# Patient Record
Sex: FEMALE | Race: BLACK OR AFRICAN AMERICAN | NOT HISPANIC OR LATINO | Employment: STUDENT | URBAN - METROPOLITAN AREA
[De-identification: names, ages, dates, MRNs, and addresses within clinical notes are randomized per-mention and may not be internally consistent; named-entity substitution may affect disease eponyms.]

---

## 2020-06-11 ENCOUNTER — OFFICE VISIT CONVERTED (OUTPATIENT)
Dept: INTERNAL MEDICINE | Facility: CLINIC | Age: 25
End: 2020-06-11
Attending: PHYSICIAN ASSISTANT

## 2020-06-11 ENCOUNTER — HOSPITAL ENCOUNTER (OUTPATIENT)
Dept: OTHER | Facility: HOSPITAL | Age: 25
Discharge: HOME | End: 2020-06-11
Attending: PHYSICIAN ASSISTANT

## 2020-06-11 ENCOUNTER — CONVERSION ENCOUNTER (OUTPATIENT)
Dept: INTERNAL MEDICINE | Facility: CLINIC | Age: 25
End: 2020-06-11

## 2020-06-11 LAB
ALBUMIN SERPL-MCNC: 4.4 G/DL (ref 3.5–5)
ALBUMIN/GLOB SERPL: 1.3 {RATIO} (ref 1.4–2.6)
ALP SERPL-CCNC: 54 U/L (ref 42–98)
ALT SERPL-CCNC: 21 U/L (ref 10–40)
ANION GAP SERPL CALC-SCNC: 14 MMOL/L (ref 8–19)
AST SERPL-CCNC: 21 U/L (ref 15–50)
BASOPHILS # BLD AUTO: 0.05 10*3/UL (ref 0–0.2)
BASOPHILS NFR BLD AUTO: 0.7 % (ref 0–3)
BILIRUB SERPL-MCNC: 0.17 MG/DL (ref 0.2–1.3)
BUN SERPL-MCNC: 11 MG/DL (ref 5–25)
BUN/CREAT SERPL: 14 {RATIO} (ref 6–20)
CALCIUM SERPL-MCNC: 9.5 MG/DL (ref 8.7–10.4)
CHLORIDE SERPL-SCNC: 103 MMOL/L (ref 99–111)
CHOLEST SERPL-MCNC: 211 MG/DL (ref 107–200)
CHOLEST/HDLC SERPL: 4.5 {RATIO} (ref 3–6)
CONV ABS IMM GRAN: 0.02 10*3/UL (ref 0–0.2)
CONV CO2: 23 MMOL/L (ref 22–32)
CONV IMMATURE GRAN: 0.3 % (ref 0–1.8)
CONV TOTAL PROTEIN: 7.7 G/DL (ref 6.3–8.2)
CREAT UR-MCNC: 0.78 MG/DL (ref 0.5–0.9)
DEPRECATED RDW RBC AUTO: 60.6 FL (ref 36.4–46.3)
EOSINOPHIL # BLD AUTO: 0.07 10*3/UL (ref 0–0.7)
EOSINOPHIL # BLD AUTO: 1 % (ref 0–7)
ERYTHROCYTE [DISTWIDTH] IN BLOOD BY AUTOMATED COUNT: 17.6 % (ref 11.7–14.4)
GFR SERPLBLD BASED ON 1.73 SQ M-ARVRAT: >60 ML/MIN/{1.73_M2}
GLOBULIN UR ELPH-MCNC: 3.3 G/DL (ref 2–3.5)
GLUCOSE SERPL-MCNC: 81 MG/DL (ref 65–99)
HCT VFR BLD AUTO: 39.9 % (ref 37–47)
HDLC SERPL-MCNC: 47 MG/DL (ref 40–60)
HGB BLD-MCNC: 12.1 G/DL (ref 12–16)
LDLC SERPL CALC-MCNC: 134 MG/DL (ref 70–100)
LYMPHOCYTES # BLD AUTO: 1.59 10*3/UL (ref 1–5)
LYMPHOCYTES NFR BLD AUTO: 23 % (ref 20–45)
MCH RBC QN AUTO: 28.3 PG (ref 27–31)
MCHC RBC AUTO-ENTMCNC: 30.3 G/DL (ref 33–37)
MCV RBC AUTO: 93.4 FL (ref 81–99)
MONOCYTES # BLD AUTO: 0.65 10*3/UL (ref 0.2–1.2)
MONOCYTES NFR BLD AUTO: 9.4 % (ref 3–10)
NEUTROPHILS # BLD AUTO: 4.54 10*3/UL (ref 2–8)
NEUTROPHILS NFR BLD AUTO: 65.6 % (ref 30–85)
NRBC CBCN: 0 % (ref 0–0.7)
OSMOLALITY SERPL CALC.SUM OF ELEC: 280 MOSM/KG (ref 273–304)
PLATELET # BLD AUTO: 305 10*3/UL (ref 130–400)
PMV BLD AUTO: 10.9 FL (ref 9.4–12.3)
POTASSIUM SERPL-SCNC: 4.1 MMOL/L (ref 3.5–5.3)
RBC # BLD AUTO: 4.27 10*6/UL (ref 4.2–5.4)
SODIUM SERPL-SCNC: 136 MMOL/L (ref 135–147)
TRIGL SERPL-MCNC: 148 MG/DL (ref 40–150)
TSH SERPL-ACNC: 1.17 M[IU]/L (ref 0.27–4.2)
VLDLC SERPL-MCNC: 30 MG/DL (ref 5–37)
WBC # BLD AUTO: 6.92 10*3/UL (ref 4.8–10.8)

## 2020-06-13 LAB — HCV AB SER DONR QL: >11 S/CO RATIO (ref 0–0.9)

## 2020-06-16 LAB
CONV HEPATITIS C TEST INFO: NORMAL
HCV RNA SERPL NAA+PROBE-ACNC: NORMAL IU/ML

## 2020-06-25 ENCOUNTER — OFFICE VISIT CONVERTED (OUTPATIENT)
Dept: INTERNAL MEDICINE | Facility: CLINIC | Age: 25
End: 2020-06-25
Attending: PHYSICIAN ASSISTANT

## 2020-08-06 ENCOUNTER — OFFICE VISIT CONVERTED (OUTPATIENT)
Dept: INTERNAL MEDICINE | Facility: CLINIC | Age: 25
End: 2020-08-06
Attending: PHYSICIAN ASSISTANT

## 2020-08-20 ENCOUNTER — OFFICE VISIT CONVERTED (OUTPATIENT)
Dept: ORTHOPEDIC SURGERY | Facility: CLINIC | Age: 25
End: 2020-08-20
Attending: ORTHOPAEDIC SURGERY

## 2020-08-20 ENCOUNTER — CONVERSION ENCOUNTER (OUTPATIENT)
Dept: ORTHOPEDIC SURGERY | Facility: CLINIC | Age: 25
End: 2020-08-20

## 2020-09-10 ENCOUNTER — CONVERSION ENCOUNTER (OUTPATIENT)
Dept: ORTHOPEDIC SURGERY | Facility: CLINIC | Age: 25
End: 2020-09-10

## 2020-09-10 ENCOUNTER — OFFICE VISIT CONVERTED (OUTPATIENT)
Dept: ORTHOPEDIC SURGERY | Facility: CLINIC | Age: 25
End: 2020-09-10
Attending: PHYSICIAN ASSISTANT

## 2020-10-06 ENCOUNTER — OFFICE VISIT CONVERTED (OUTPATIENT)
Dept: ORTHOPEDIC SURGERY | Facility: CLINIC | Age: 25
End: 2020-10-06
Attending: ORTHOPAEDIC SURGERY

## 2020-12-10 ENCOUNTER — HOSPITAL ENCOUNTER (OUTPATIENT)
Dept: LABOR AND DELIVERY | Facility: HOSPITAL | Age: 25
Discharge: HOME | End: 2020-12-10
Attending: OBSTETRICS & GYNECOLOGY

## 2020-12-10 LAB — FIBRONECTIN FETAL VAG QL: NEGATIVE

## 2021-01-18 ENCOUNTER — HOSPITAL ENCOUNTER (OUTPATIENT)
Dept: LABOR AND DELIVERY | Facility: HOSPITAL | Age: 26
Discharge: HOME | End: 2021-01-18
Attending: OBSTETRICS & GYNECOLOGY

## 2021-01-23 ENCOUNTER — HOSPITAL ENCOUNTER (OUTPATIENT)
Dept: PREADMISSION TESTING | Facility: HOSPITAL | Age: 26
Discharge: HOME | End: 2021-01-23
Attending: OBSTETRICS & GYNECOLOGY

## 2021-01-23 LAB — SARS-COV-2 RNA SPEC QL NAA+PROBE: NOT DETECTED

## 2021-05-10 NOTE — H&P
History and Physical      Patient Name: Abbe Amos   Patient ID: [id number]   Sex: Female   YOB: 1995    Referring Provider: Natalie Pierce PA-C    Visit Date: June 11, 2020    Provider: Babs Muñoz PA-C   Location: [facility]   Location Address: [address] [place] [address], [address]  [place], [place]  [id number]   Location Phone: [phone]          Chief Complaint  · est care       History Of Present Illness  Abbe Amos is a 25 year old /Black female who presents for evaluation and treatment of:      est care previous pcp: no just moved back from [place].   Last pap: Early 2019  [place] labs: out of state  tobacco: everyday 1ppd, since age 14.  Specialists: N/A, does not see anyone.     Depression: Will be seeing a new therapist through [facility], online. Apt June 16 with counselor and June 17 with psychiatrist.    anxiety/depression- pt says it feels high right now, taking meds everyday for about a month.   Has been really down lately.   She had thoughts that things would be better off dead.   denies plan to harm self in any way. Last time she had a plan she cut herself in Feb 2020. She has numerous scars on arms and neck from previous cutting.  Lives with boyfriend and boyfriend's sister. She feels safe at home.  She feels down because she is pregnant and 2 of her children were taken from her and now live with her aunt. She has not seen or talked to them in 1 yr. She wants to be a mom but worries her mood is not stable. She is upset because when she was doing everything right her children were still taken from her.   She gave 2 other children up for adoption at birth.    Pregnancy- thinks she is about 6 weeks, would be 5th child.   April 27th last period  She started a prenatal when she missed her period.     hepatitis C- dx a little over a year ago.   She was referred to GI doctor in [place] but she never went to apt.  Denies abd pain    Acute concerns: bottom  jaw pain for about a week, thinks it may be her wisdom teeth. would like recommendation or medication. Been taking Tylenol 2tab q4h.     Previous meth use: last use was June 2019.   She states she has been clean since then    Tobacco: smoking 1 PPD  Does not want to quit.       Past Medical History  Disease Name Date Onset Notes   Anxiety --  --    Chemical dependency 6/2019 --    Depression --  --    Hepatitis --  --    History of methamphetamine abuse 06/11/2020 Cont avoidance of drug use, especially now since pt is pregnant.    History of self-harm 06/11/2020 --    Nicotine dependence --  --    Night sweats --  --    Pregnancy --  --    Sexually transmissible disease --  --    Shortness of Breath --  --    Suicidal ideations 06/11/2020 Discussed suicidal thoughts. Pt signed safety contract today. If thoughts return pt needs to call 911 or go immediately to ER for treatment. We will refer asap to psychiatry for counseling. Discussed safety plan with pt. Discussed need to lock up all weapons and medications in the home. Encouraged patient to find things pt enjoys. Discussed different medication options and side effects. RTC for re-evaluation. Pt understands and agrees         Past Surgical History  Procedure Name Date Notes   *No Past Surgical History --  --          Medication List  Name Date Started Instructions   buspirone 10 mg oral tablet  take 1 tablet (10 mg) by oral route 3 times per day   prazosin 2 mg oral capsule  take 1 capsule (2 mg) by oral route a day   Prenatal oral  --    Wellbutrin  mg oral tablet extended release 24 hr  take 1 tablet (150 mg) by oral route once daily   Zoloft 100 mg oral tablet  take 1 tablet (100 mg) by oral route once daily         Allergy List  Allergen Name Date Reaction Notes   NO KNOWN DRUG ALLERGIES --  --  --        Allergies Reconciled  Family Medical History  Disease Name Relative/Age Notes   Thyroid Gland Neoplasm, Malignant Mother/   --          Social  "History  Finding Status Start/Stop Quantity Notes   Alcohol Never --/-- --  --    Substance Abuse Former --/-- --  meth. last use Yessi 3, 2019   Tobacco Current every day --/-- 1ppd --          Review of Systems  · Constitutional  o Denies  o : fatigue, fever, weight gain, weight loss, chills  · Eyes  o Denies  o : changes in vision  · HENT  o Denies  o : ear pain, sore throat  · Cardiovascular  o Denies  o : chest Pain, palpitations, edema (swelling)  · Respiratory  o Denies  o : frequent cough, shortness of breath  · Gastrointestinal  o Denies  o : nausea, vomiting, changes in bowel habits  · Genitourinary  o Denies  o : dysuria, urinary frequency, urinary urgency, polyuria  · Integument  o Denies  o : rash  · Neurologic  o Denies  o : headache, tingling or numbness, dizziness  · Musculoskeletal  o Denies  o : joint pain, myalgias  · Endocrine  o Denies  o : polydipsia, polyphagia  · Psychiatric  o Admits  o : anxiety, sadness/tearfulness, depression, suicidal ideation  o Denies  o : homicidal ideation, memory changes  · Heme-Lymph  o Denies  o : easy bruising, easy bleeding, lymph node enlargement or tenderness  · Allergic-Immunologic  o Denies  o : eczema, seasonal allergies, urticaria      Vitals  Date Time BP Position Site L\R Cuff Size HR RR TEMP (F) WT  HT  BMI kg/m2 BSA m2 O2 Sat        06/11/2020 11:28 /80 Sitting    80 - R 18 98.1 176lbs 6oz 5'  6\" 28.47 1.93 100 %          Physical Examination  · Constitutional  o Appearance  o : no acute distress, well-nourished  · Head and Face  o Head  o :   § Inspection  § : atraumatic, normocephalic  · Eyes  o Eyes  o : extraocular movements intact, no scleral icterus, no conjunctival injection  · Ears, Nose, Mouth and Throat  o Ears  o :   § External Ears  § : normal  § Otoscopic Examination  § : tympanic membrane appearance within normal limits bilaterally  o Nose  o :   § Intranasal Exam  § : nares patent  o Oral Cavity  o :   § Oral Mucosa  § : moist " mucous membranes  o Throat  o :   § Oropharynx  § : no inflammation or lesions present, tonsils within normal limits  · Neck  o Thyroid  o : gland size normal, nontender, no nodules or masses present on palpation, symmetric  · Respiratory  o Respiratory Effort  o : breathing comfortably, symmetric chest rise  o Auscultation of Lungs  o : clear to asculatation bilaterally, no wheezes, rales, or rhonchii  · Cardiovascular  o Heart  o :   § Auscultation of Heart  § : regular rate and rhythm, no murmurs, rubs, or gallops  o Peripheral Vascular System  o :   § Extremities  § : no edema  · Gastrointestinal  o Abdominal Examination  o :   § Abdomen  § : bowel sounds present, non-distended, non-tender  · Lymphatic  o Neck  o : no lymphadenopathy present  o Supraclavicular Nodes  o : no supraclavicular nodes  · Skin and Subcutaneous Tissue  o General Inspection  o : numerous healed scars noted on bilateral forearms and R side of neck  · Neurologic  o Mental Status Examination  o :   § Orientation  § : grossly oriented to person, place and time  o Gait and Station  o :   § Gait Screening  § : normal gait  · Psychiatric  o General  o : normal mood and affect          Results  · In-Office Procedures  o Lab procedure  § Urine pregnancy test (66704)   § B-HCG Ur Ql: Positive   § Internal Control Verified?: Yes       Assessment  · Screening for depression     V79.0/Z13.89  positive  · Nicotine dependence     305.1/F17.200  discussed risks of tobacco use with pregnancy including miscarriage, low birth wgt, prematurity. Pt does not want to quit at this time.  · Recurrent mild major depressive disorder with anxiety       Major depressive disorder, recurrent, mild     296.31/F33.0  Anxiety disorder, unspecified     296.31/F41.9  Mood not well controlled on current medication. Referral placed for eval at Dr. Bailey who specializes in pregnant females per Dr. Boswell. PT discussed with Dr. Boswell who is in agreement. Keep apt with  counselor/psychiatrist via telehealth next wk. Discussed risks of Wellbutrin with pregnancy vs risks of discontinuing medication since pt unstable currently and has had SI without a plan. Pt would like to cont current medication until we discuss with a specialist. She signed safety contract today. RTC 2 wks to re-check.  · Anxiety     300.02/F41.1  · Hepatitis     573.3/K75.9  Will get hepatitis labs and refer to GI. Discussed with current pregnancy she will need monitoring but I am not sure if they will discuss medications at this time.  · Pregnancy     V22.2/Z34.90  Positive UP today, referred to OB to establish care.  · History of methamphetamine abuse     305.73/F15.11  Cont avoidance of drug use, especially now since pt is pregnant.   · Suicidal ideations     V62.84/R45.851  Discussed suicidal thoughts. Pt signed safety contract today. If thoughts return pt needs to call 911 or go immediately to ER for treatment. We will refer asap to psychiatry for counseling. Discussed safety plan with pt. Discussed need to lock up all weapons and medications in the home. Encouraged patient to find things pt enjoys. Discussed different medication options and side effects. RTC for re-evaluation. Pt understands and agrees  · History of self-harm     V15.59/Z91.5  · Tooth pain     525.9/K08.89  Encouraged to set up apt with local dentist to eval teeth. Cont Tylenol prn. NO need for abx at this time.      Plan  · Orders  o ACO-18: Positive screen for clinical depression using a standardized tool and a follow-up plan documented () - V79.0/Z13.89 - 06/11/2020  o Smoking cessation counseling, 3-10 minutes [facility] (09712) - 305.1/F17.200 - 06/11/2020  o ACO-17: Screened for tobacco use AND received tobacco cessation intervention (4004F) - 305.1/F17.200 - 06/11/2020  o Physical, Primary Care Panel (CBC, CMP, Lipid, TSH) [facility] (91017, 28265, 80019, 11865) - V22.2/Z34.90, 573.3/K75.9, 300.02/F41.1 - 06/11/2020  o ACO-39: Current  medications updated and reviewed () - - 06/11/2020  o Hepatitis C antibody (59539) - V22.2/Z34.90, 573.3/K75.9, 300.02/F41.1 - 06/11/2020  o Hepatitis C RNA Quantitative [facility] (25337) - V22.2/Z34.90, 573.3/K75.9, 300.02/F41.1 - 06/11/2020  o OB/GYN CONSULTATION (OBGYN) - V22.2/Z34.90, 296.31/F33.0, V15.59/Z91.5 - 06/11/2020  o PSYCHIATRY CONSULTATION (PSYCH) - V22.2/Z34.90, 296.31/F33.0, 296.31/F41.9, V62.84/R45.851 - 06/11/2020   Dr. Brie Bailey [facility]- fax [phone]. Telehealth visit or discuss specialist closer to patient living address (not reliable transportation). Would like to discuss pt case with Dr. Bailey if possible  o Gastroenterology Consultation (GASTR) - 573.3/K75.9 - 06/11/2020  · Medications  o Medications have been Reconciled  o Transition of Care or Provider Policy  · Instructions  o Depression Screen completed and scanned into the EMR under the designated folder within the patient's documents.  o Today's PHQ-9 result is _20__  o A list of local qualified behavioral health care centers, psychologists, and psychiatrists were given to the patient at the time of the visit today.  o *Form of nicotine being used: cigarette  o Patient was strongly encouraged to discontinue use of any nicotine containing product or minimize the use of the product.  o Handouts were given to patient: safety plan  o Patient was educated/instructed on their diagnosis, treatment and medications prior to discharge from the clinic today.  o Electronically Identified Patient Education Materials Provided Electronically  · Disposition  o Call or Return if symptoms worsen or persist.  o Follow up in 2 weeks  o Labs drawn in office today  o Care Transition  o CECILY Sent            Electronically Signed by: Babs Muñoz PA-C -Author on June 11, 2020 03:15:00 PM

## 2021-05-10 NOTE — H&P
History and Physical      Patient Name: Abbe Amos   Patient ID: [id number]   Sex: Female   YOB: 1995    Primary Care Provider: Babs Muñoz PA-C   Referring Provider: Babs Muñoz PA-C    Visit Date: August 20, 2020    Provider: Lexa Walker MD   Location: [facility]   Location Address: [address] [place] [address]  [id number]   Location Phone: [phone]          Chief Complaint  · Right Hand Injury      History Of Present Illness  Abbe Amos is a 25 year old /Black female who presents for a right hand injury sustained on 08/18/2020 after punching her leg. Patient states pain in the 5th metacarpal. Patient is in a splint and sling.       Past Medical History  Anxiety; Chemical dependency; Depression; Hepatitis; History of methamphetamine abuse; History of self-harm; Hyperlipemia; Nicotine dependence; Night sweats; Pregnancy; Sexually transmissible disease; Shortness of Breath; Suicidal ideations         Past Surgical History  *No Past Surgical History         Medication List  buspirone 10 mg oral tablet; PNV cmb#95-ferrous fumarate-FA 28 mg iron- 800 mcg oral tablet; prazosin 2 mg oral capsule; Wellbutrin  mg oral tablet extended release 24 hr; Zoloft 100 mg oral tablet         Allergy List  NO KNOWN DRUG ALLERGIES; NO KNOWN DRUG ALLERGIES         Family Medical History  Thyroid Gland Neoplasm, Malignant; Cancer, Unspecified         Social History  Alcohol (Never); Alcohol Use (Never); lives with other; Pregnant (Unknown); Recreational Drug Use (Never); Single.; Substance Abuse (Former); Tobacco (Current every day); Unemployed.         Review of Systems  · Constitutional  o Denies  o : fever, chills, weight loss  · Cardiovascular  o Denies  o : chest pain, shortness of breath  · Gastrointestinal  o Denies  o : liver disease, heartburn, nausea, blood in stools  · Genitourinary  o Denies  o : painful urination, blood in urine  · Integument  o Denies  o : rash,  "itching  · Neurologic  o Denies  o : headache, weakness, loss of consciousness  · Musculoskeletal  o Admits  o : painful, swollen joints  · Psychiatric  o Admits  o : anxiety, depression  o Denies  o : drug/alcohol addiction      Vitals  Date Time BP Position Site L\R Cuff Size HR RR TEMP (F) WT  HT  BMI kg/m2 BSA m2 O2 Sat HC       08/20/2020 01:07 PM         176lbs 16oz 5'  6\" 28.57 1.93           Physical Examination  · Constitutional  o Appearance  o : well developed, well-nourished, no obvious deformities present  · Head and Face  o Head  o :   § Inspection  § : normocephalic  o Face  o :   § Inspection  § : no facial lesions  · Eyes  o Conjunctivae  o : conjunctivae normal  o Sclerae  o : sclerae white  · Ears, Nose, Mouth and Throat  o Ears  o :   § External Ears  § : appearance within normal limits  § Hearing  § : intact  o Nose  o :   § External Nose  § : appearance normal  · Neck  o Inspection/Palpation  o : normal appearance  o Range of Motion  o : full range of motion  · Respiratory  o Respiratory Effort  o : breathing unlabored  o Inspection of Chest  o : normal appearance  o Auscultation of Lungs  o : no audible wheezing or rales  · Cardiovascular  o Heart  o : regular rate  · Gastrointestinal  o Abdominal Examination  o : soft and non-tender  · Skin and Subcutaneous Tissue  o General Inspection  o : intact, no rashes  · Psychiatric  o General  o : Alert and oriented x3  o Judgement and Insight  o : judgment and insight intact  o Mood and Affect  o : mood normal, affect appropriate  · Right Hand  o Inspection  o : No skin discoloration. Mild swelling. Palpable tenderness over the 5th metacarpal. Full range of motion of elbow and shoulder. Neurovascularly grossly intact. Sensation grossly intact. 2+ radial and ulnar pulses.   · Casting  o Extremity  o : right arm, Short arm cast   o Procedure  o : Closed treatment was obtained and fiberglass cast was applied. The patient tolerated the procedure without " any complications.   · Imaging  o Imaging  o : X-rays right hand performed at [facility] on 08/18/2020 concluded oblique, nondisplaced fracture of mid 5th metacarpal shaft.           Assessment  · Right 5th metacarpal fracture     815.00  · Pain of right hand     719.44/M25.541      Plan  · Orders  o Casting Supplies (Short Arm) Adult () - - 08/20/2020  o Application of short arm cast (08108) - - 08/20/2020  · Medications  o Medications have been Reconciled  o Transition of Care or Provider Policy  · Instructions  o Reviewed the patient's Past Medical, Social, and Family history as well as the ROS at today's visit, no changes.  o Call or return if worsening symptoms.  o Reviewed Xrays.  o This note was transcribed by Esperanza [email or web address].  o Boxer's cast. Follow up in 3 weeks, remove cast and X-ray at that time, proceed with brace.             Electronically Signed by: Esperanza Gordon-, Other -Author on August 22, 2020 11:43:11 AM  Electronically Co-signed by: DENISA Montoya-MILLY -Reviewer on August 24, 2020 08:54:37 AM  Electronically Co-signed by: Lexa Walker MD -Reviewer on August 27, 2020 04:55:08 PM

## 2021-05-13 NOTE — PROGRESS NOTES
Progress Note      Patient Name: Abbe Amos   Patient ID: [id number]   Sex: Female   YOB: 1995    Primary Care Provider: Babs Muñoz PA-C   Referring Provider: Babs Muñoz PA-C    Visit Date: October 6, 2020    Provider: Lexa Walker MD   Location: [facility]   Location Address: [address] [place] [address]  [id number]   Location Phone: [phone]          Chief Complaint  · Right Hand Pain      History Of Present Illness  Abbe Amos is a 25 year old /Black female who presents today to [facility].      Patient presents today with a follow-up of right hand pain. Patient sustained a right fifth metacarpal fracture sustained 8/18/20, when she punched her leg. Patient states that her hand has been doing well. She feels like she has most her ROM back and her hands hasn't been swelling up on her.                      Past Medical History  Anxiety; Chemical dependency; Depression; Hepatitis; History of methamphetamine abuse; History of self-harm; Hyperlipemia; Nicotine dependence; Night sweats; Pregnancy; Sexually transmissible disease; Shortness of Breath; Suicidal ideations         Past Surgical History  *No Past Surgical History         Medication List  buspirone 10 mg oral tablet; PNV cmb#95-ferrous fumarate-FA 28 mg iron- 800 mcg oral tablet; prazosin 2 mg oral capsule; Wellbutrin  mg oral tablet extended release 24 hr; Zoloft 100 mg oral tablet         Allergy List  NO KNOWN DRUG ALLERGIES       Allergies Reconciled  Family Medical History  Thyroid Gland Neoplasm, Malignant; Cancer, Unspecified         Social History  Alcohol (Never); Alcohol Use (Never); lives with other; Pregnant (Unknown); Recreational Drug Use (Never); Single.; Substance Abuse (Former); Tobacco (Current every day); Unemployed.         Review of Systems  · Constitutional  o Denies  o : fever, chills, weight loss  · Cardiovascular  o Denies  o : chest pain, shortness of  "breath  · Gastrointestinal  o Denies  o : liver disease, heartburn, nausea, blood in stools  · Genitourinary  o Denies  o : painful urination, blood in urine  · Integument  o Denies  o : rash, itching  · Neurologic  o Denies  o : headache, weakness, loss of consciousness  · Musculoskeletal  o Denies  o : painful, swollen joints  · Psychiatric  o Denies  o : drug/alcohol addiction, anxiety, depression      Vitals  Date Time BP Position Site L\R Cuff Size HR RR TEMP (F) WT  HT  BMI kg/m2 BSA m2 O2 Sat FR L/min FiO2 HC       10/06/2020 04:15 PM         193lbs 0oz 5'  6\" 31.15 2.02             Physical Examination  · Constitutional  o Appearance  o : well developed, well-nourished, no obvious deformities present  · Head and Face  o Head  o :   § Inspection  § : normocephalic  o Face  o :   § Inspection  § : no facial lesions  · Eyes  o Conjunctivae  o : conjunctivae normal  o Sclerae  o : sclerae white  · Ears, Nose, Mouth and Throat  o Ears  o :   § External Ears  § : appearance within normal limits  § Hearing  § : intact  o Nose  o :   § External Nose  § : appearance normal  · Neck  o Inspection/Palpation  o : normal appearance  o Range of Motion  o : full range of motion  · Respiratory  o Respiratory Effort  o : breathing unlabored  o Inspection of Chest  o : normal appearance  o Auscultation of Lungs  o : no audible wheezing or rales  · Cardiovascular  o Heart  o : regular rate  · Gastrointestinal  o Abdominal Examination  o : soft and non-tender  · Skin and Subcutaneous Tissue  o General Inspection  o : intact, no rashes  · Psychiatric  o General  o : Alert and oriented x3  o Judgement and Insight  o : judgment and insight intact  o Mood and Affect  o : mood normal, affect appropriate  · Right Hand  o Inspection  o : Sensation grossly intact. Neurovascular intact. No deformity of the right hand. Non-tender over the 5th metacarpal. No malrotation of 5th digit. Skin intact. No swelling, skin discoloration or atrophy. " Near full ROM. Patient able to wiggle fingers. Patient able to make a fist. Full wrist extension, full wrist flexion, full , full thumb opposition, full PIP flexors, full DIP flexors, full PIP extensors, full finger adduction, full finger abduction. Radial pulse 2+, ulnar pulse 2+.   · In Office Procedures  o View  o : AP/LATERAL  o Site  o : right, hand   o Indication  o : Right Hand Pain  o Study  o : X-rays ordered, taken in the office, and reviewed today.  o Xray  o : Well healed 5th metacarpal fracture.           Assessment  · 5th metacarpal fracture     815.00  · Arthralgia of right hand     719.44/M25.541      Plan  · Orders  o Hand (Right) [facility] ([id number]) - 719.44/M25.541 - 10/06/2020  · Medications  o Medications have been Reconciled  o Transition of Care or Provider Policy  · Instructions  o Dr. Walker saw and examined the patient and agrees with plan.   o X-rays reviewed by Dr. Walker.  o Reviewed the patient's Past Medical, Social, and Family history as well as the ROS at today's visit, no changes.  o Call or return if worsening symptoms.  o Follow Up PRN.  o This note was transcribed by Jessica Alva.   o Discussed diagnosis and treatment options with the patient. Patient will continue back into daily activities as tolerated.             Electronically Signed by: Jessica Alva-, Other -Author on October 8, 2020 02:39:05 PM  Electronically Co-signed by: Lexa Walker MD -Reviewer on October 9, 2020 08:19:33 PM

## 2021-05-13 NOTE — PROGRESS NOTES
Progress Note      Patient Name: Abbe Amos   Patient ID: [id number]   Sex: Female   YOB: 1995    Primary Care Provider: Babs Muñoz PA-C   Referring Provider: Babs Muñoz PA-C    Visit Date: June 25, 2020    Provider: Babs Muñoz PA-C   Location: [facility]   Location Address: [address] [place] [address], [address]  [place], [place]  [id number]   Location Phone: [phone]          Chief Complaint  · Follow up      History Of Present Illness  Abbe Amos is a 25 year old /Black female who presents for evaluation and treatment of:      follow up    Depression/anxiety: improved a lot since last visit  She is taking meds daily as she is supposed to take them and not missing pills as often  She was not able to have psych apt due to not being able to afford the co-pay  She denies si/hi    Pregnancy: has apt with Dr. Beltran 6/29    Hep c: has apt with clinic on 7/30           Past Medical History  Disease Name Date Onset Notes   Anxiety --  --    Chemical dependency 6/2019 --    Depression --  --    Hepatitis --  --    History of methamphetamine abuse 06/11/2020 Cont avoidance of drug use, especially now since pt is pregnant.    History of self-harm 06/11/2020 --    Nicotine dependence --  --    Night sweats --  --    Pregnancy --  --    Sexually transmissible disease --  --    Shortness of Breath --  --    Suicidal ideations 06/11/2020 Discussed suicidal thoughts. Pt signed safety contract today. If thoughts return pt needs to call 911 or go immediately to ER for treatment. We will refer asap to psychiatry for counseling. Discussed safety plan with pt. Discussed need to lock up all weapons and medications in the home. Encouraged patient to find things pt enjoys. Discussed different medication options and side effects. RTC for re-evaluation. Pt understands and agrees         Past Surgical History  Procedure Name Date Notes   *No Past Surgical History --  --   "        Medication List  Name Date Started Instructions   buspirone 10 mg oral tablet  take 1 tablet (10 mg) by oral route 3 times per day   prazosin 2 mg oral capsule  take 1 capsule (2 mg) by oral route a day   Prenatal oral  --    Wellbutrin  mg oral tablet extended release 24 hr  take 1 tablet (150 mg) by oral route once daily   Zoloft 100 mg oral tablet  take 1 tablet (100 mg) by oral route once daily         Allergy List  Allergen Name Date Reaction Notes   NO KNOWN DRUG ALLERGIES --  --  --        Allergies Reconciled  Family Medical History  Disease Name Relative/Age Notes   Thyroid Gland Neoplasm, Malignant Mother/   --          Social History  Finding Status Start/Stop Quantity Notes   Alcohol Never --/-- --  --    Substance Abuse Former --/-- --  meth. last use Yessi 3, 2019   Tobacco Current every day --/-- 1ppd --          Review of Systems  · Constitutional  o Denies  o : fever, fatigue, weight loss, weight gain  · Cardiovascular  o Denies  o : lower extremity edema, claudication, chest pressure, palpitations  · Respiratory  o Denies  o : shortness of breath, wheezing, cough, hemoptysis, dyspnea on exertion  · Gastrointestinal  o Denies  o : nausea, vomiting, diarrhea, constipation, abdominal pain  · Psychiatric  o Admits  o : anxiety, depression  o Denies  o : suicidal ideation, homicidal ideation      Vitals  Date Time BP Position Site L\R Cuff Size HR RR TEMP (F) WT  HT  BMI kg/m2 BSA m2 O2 Sat HC       06/11/2020 11:28 /80 Sitting    80 - R 18 98.1 176lbs 6oz 5'  6\" 28.47 1.93 100 %    06/25/2020 01:28 /72 Sitting    77 - R  97.7 174lbs 2oz 5'  6\" 28.1 1.92 98 %          Physical Examination  · Constitutional  o Appearance  o : no acute distress, well-nourished  · Head and Face  o Head  o :   § Inspection  § : atraumatic, normocephalic  · Ears, Nose, Mouth and Throat  o Ears  o :   § External Ears  § : normal  o Nose  o :   § Intranasal Exam  § : nares patent  o Oral Cavity  o : "   § Oral Mucosa  § : moist mucous membranes  · Neck  o Thyroid  o : gland size normal, nontender, no nodules or masses present on palpation, symmetric  · Respiratory  o Respiratory Effort  o : breathing comfortably, symmetric chest rise  o Auscultation of Lungs  o : clear to asculatation bilaterally, no wheezes, rales, or rhonchii  · Cardiovascular  o Heart  o :   § Auscultation of Heart  § : regular rate and rhythm, no murmurs, rubs, or gallops  o Peripheral Vascular System  o :   § Extremities  § : no edema  · Skin and Subcutaneous Tissue  o General Inspection  o : numerous scars noted on bilateral forearms and neck. No open lacerations noted  · Neurologic  o Mental Status Examination  o :   § Orientation  § : grossly oriented to person, place and time  o Gait and Station  o :   § Gait Screening  § : normal gait          Assessment  · History of self-harm     V15.59/Z91.5  · Suicidal ideations     V62.84/R45.851  Discussed suicidal thoughts. If thoughts return pt needs to call 911 or go immediately to ER for treatment.Encouraged to go to psychiatry for counseling as soon as possible. Discussed safety plan with pt. Discussed need to lock up all weapons and medications in the home. Encouraged patient to find things pt enjoys. Discussed different medication options and side effects. RTC for re-evaluation. Pt understands and agrees  · Depression     296.31  · Anxiety     300.02/F41.1  · Pregnancy     V22.1/V22.1  Keep upcoming apt with OB. Discussed risks vs benefits of medications pt is currently taking. Since pt mood improved with regular use of medication, we will cont current meds and discuss with OB at apt this coming wk. Pt understands possible risks to fetus but agrees mood is much better now that she is taking meds and she is no loner having si/hi.    Problems Reconciled  Plan  · Orders  o ACO-39: Current medications updated and reviewed () - - 06/25/2020  · Medications  o Medications have been  Reconciled  o Transition of Care or Provider Policy  · Instructions  o Patient was educated/instructed on their diagnosis, treatment and medications prior to discharge from the clinic today.  · Disposition  o Call or Return if symptoms worsen or persist.  o Follow up in 6 weeks            Electronically Signed by: DENISA Zafar-MILLY -Author on June 25, 2020 01:53:42 PM  Electronically Co-signed by: Mary Sanchez MD -Reviewer on June 25, 2020 02:38:55 PM
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
## 2021-05-13 NOTE — PROGRESS NOTES
"   Progress Note      Patient Name: Abbe Amos   Patient ID: [id number]   Sex: Female   YOB: 1995    Primary Care Provider: Babs Muñoz PA-C   Referring Provider: Babs Muñoz PA-C    Visit Date: August 6, 2020    Provider: Babs Muñoz PA-C   Location: [facility]   Location Address: [address] [place] [address], [address]  [place], [place]  [id number]   Location Phone: [phone]          Chief Complaint  · depression/anxiety   · pregnancy  · hep c      History Of Present Illness  Abbe Amos is a 25 year old /Black female who presents for evaluation and treatment of:      6 week f/u for depression, pregnancy and status of hepatitis.    Depression: Patient states she has been feeling better. She states she recently moved to the [facility] on 06/27 after her baby porfirio left for a \"drug binge\". She states her mood has drastically improved since moving there. She states she called the police and requested to be taken there. She states she is able to stay there for 4 months. She states he lives close to this office, which heightens her anxiety. She states she used [employer] to get here today.    She states the shelter has encouraged her to start making bracelets and this has kept her mind off of things. She states this new hobby has helped to reduce the amount of cigarettes she smokes in a day. She now smokes half pack / day. She states she doesn't necessarily want to quit smoking, but has noticed she has decreased the amount she smokes unintentionally. She states she has a limited amount of tobacco left, and hopes to stop smoking by the end of the bag.    Denies the feeling of harming herself/SI or \"wanting to die\". Denies the feeling to harm others.  Denies significant mood swings.  Denies changes in sleep pattern.    Pregnancy: She states her BP has been high at OBGYN appointments. She states she can notice her increased HR and anxiety when she " goes. She states this is the only time she has noticed these symptoms and her increased HR. She states she feels like her emotions are well under control.  She states she has avoided conflict and believes her self awareness has improved. She states she believes the shelter has helped with this as well as her therapist. She states she sees the therapist at the shelter as well as Vanessa. She states she is scheduled out approx 2 weeks and that she has had difficulty getting in with her, so she sees the therapist at the shelter in between visits.    She switched to [facility] to Dr. Doherty on 08/24. She has not seen him for this pregnancy, but she has seen him in the past. She has had positive experiences with him.    Hep C --She missed her GI appointment due to a visit at [facility] for a panic attack and depression on 07/29. She rescheduled her appointment to 09/18.    Denies nausea, vomiting. Denies morning sickness. Denies constipation. Denies loss of appetite.    Spent 40 minutes with pt in room.       Past Medical History  Disease Name Date Onset Notes   Anxiety --  --    Chemical dependency 6/2019 --    Depression --  --    Hepatitis --  --    History of methamphetamine abuse 06/11/2020 Cont avoidance of drug use, especially now since pt is pregnant.    History of self-harm 06/11/2020 --    Nicotine dependence --  --    Night sweats --  --    Pregnancy --  --    Sexually transmissible disease --  --    Shortness of Breath --  --    Suicidal ideations 06/11/2020 Discussed suicidal thoughts. Pt signed safety contract today. If thoughts return pt needs to call 911 or go immediately to ER for treatment. We will refer asap to psychiatry for counseling. Discussed safety plan with pt. Discussed need to lock up all weapons and medications in the home. Encouraged patient to find things pt enjoys. Discussed different medication options and side effects. RTC for re-evaluation. Pt  "understands and agrees         Past Surgical History  Procedure Name Date Notes   *No Past Surgical History --  --          Medication List  Name Date Started Instructions   buspirone 10 mg oral tablet  take 1 tablet (10 mg) by oral route 3 times per day   PNV cmb#95-ferrous fumarate-FA 28 mg iron- 800 mcg oral tablet 08/06/2020 take 1 tablet by oral route daily   prazosin 2 mg oral capsule  take 1 capsule (2 mg) by oral route a day   Wellbutrin  mg oral tablet extended release 24 hr  take 1 tablet (150 mg) by oral route once daily   Zoloft 100 mg oral tablet  take 1 tablet (100 mg) by oral route once daily         Allergy List  Allergen Name Date Reaction Notes   NO KNOWN DRUG ALLERGIES --  --  --        Allergies Reconciled  Family Medical History  Disease Name Relative/Age Notes   Thyroid Gland Neoplasm, Malignant Mother/   --          Social History  Finding Status Start/Stop Quantity Notes   Alcohol Never --/-- --  --    Substance Abuse Former --/-- --  meth. last use Yessi 3, 2019   Tobacco Current every day --/-- 1ppd --          Review of Systems  · Constitutional  o Denies  o : fever, fatigue, weight loss, weight gain  · Cardiovascular  o Denies  o : lower extremity edema, claudication, chest pressure, palpitations  · Respiratory  o Denies  o : shortness of breath, wheezing, frequent cough, hemoptysis, dyspnea on exertion  · Gastrointestinal  o Denies  o : nausea, vomiting, diarrhea, constipation, abdominal pain  · Psychiatric  o Admits  o : anxiety, depression  o Denies  o : suicidal ideation, homicidal ideation      Vitals  Date Time BP Position Site L\R Cuff Size HR RR TEMP (F) WT  HT  BMI kg/m2 BSA m2 O2 Sat HC       06/11/2020 11:28 /80 Sitting    80 - R 18 98.1 176lbs 6oz 5'  6\" 28.47 1.93 100 %    06/25/2020 01:28 /72 Sitting    77 - R  97.7 174lbs 2oz 5'  6\" 28.1 1.92 98 %    08/06/2020 01:20 /68 Sitting    98 - R  97.6 176lbs 16oz 5'  6\" 28.57 1.93 100 %          Physical " Examination  · Constitutional  o Appearance  o : no acute distress, well-nourished  · Head and Face  o Head  o :   § Inspection  § : atraumatic, normocephalic  · Eyes  o Eyes  o : extraocular movements intact, no scleral icterus, no conjunctival injection  · Ears, Nose, Mouth and Throat  o Ears  o :   § External Ears  § : normal  o Nose  o :   § Intranasal Exam  § : nares patent  o Oral Cavity  o :   § Oral Mucosa  § : moist mucous membranes  · Respiratory  o Respiratory Effort  o : breathing comfortably, symmetric chest rise  o Auscultation of Lungs  o : clear to asculatation bilaterally, no wheezes, rales, or rhonchii  · Cardiovascular  o Heart  o :   § Auscultation of Heart  § : regular rate and rhythm, no murmurs, rubs, or gallops  o Peripheral Vascular System  o :   § Extremities  § : no edema  · Gastrointestinal  o Abdominal Examination  o :   § Abdomen  § : bowel sounds present, non-distended, non-tender  · Skin and Subcutaneous Tissue  o General Inspection  o : no lesions present, no areas of discoloration, skin turgor normal  · Neurologic  o Mental Status Examination  o :   § Orientation  § : grossly oriented to person, place and time  o Gait and Station  o :   § Gait Screening  § : normal gait  · Psychiatric  o General  o : normal mood and affect              Assessment  · History of self-harm     V15.59/Z91.5  Denies current si/hi  · Depression     296.31  Mood stable, will cont follow up and med mgmt. with psychiatry   · Pregnancy     V22.1/V22.1  Pt will keep apt with Dr. Doherty  · Hepatitis     573.3/K75.9  PT will keep rescheduled apt for eval      Plan  · Medications  o PNV cmb#95-ferrous fumarate-FA 28 mg iron- 800 mcg oral tablet   SIG: take 1 tablet by oral route daily   DISP: (90) tablets with 1 refills  Prescribed on 08/06/2020     o Medications have been Reconciled  o Transition of Care or Provider Policy  · Instructions  o Patient was educated/instructed on their diagnosis, treatment and  medications prior to discharge from the clinic today.  · Disposition  o Call or Return if symptoms worsen or persist.  o Follow up in 2 months            Electronically Signed by: Babs Muñoz PA-C -Author on August 7, 2020 08:53:59 PM  Electronically Co-signed by: Mary Sanchez MD -Reviewer on August 10, 2020 09:53:43 AM

## 2021-05-14 VITALS — WEIGHT: 177 LBS | BODY MASS INDEX: 28.45 KG/M2 | HEIGHT: 66 IN

## 2021-05-14 VITALS — OXYGEN SATURATION: 93 % | HEIGHT: 66 IN | WEIGHT: 193 LBS | HEART RATE: 78 BPM | BODY MASS INDEX: 31.02 KG/M2

## 2021-05-14 VITALS — BODY MASS INDEX: 31.02 KG/M2 | HEIGHT: 66 IN | WEIGHT: 193 LBS

## 2021-05-15 VITALS
TEMPERATURE: 97.7 F | WEIGHT: 174.12 LBS | DIASTOLIC BLOOD PRESSURE: 72 MMHG | OXYGEN SATURATION: 98 % | BODY MASS INDEX: 27.98 KG/M2 | HEART RATE: 77 BPM | HEIGHT: 66 IN | SYSTOLIC BLOOD PRESSURE: 126 MMHG

## 2021-05-15 VITALS
SYSTOLIC BLOOD PRESSURE: 124 MMHG | WEIGHT: 176.37 LBS | HEART RATE: 80 BPM | RESPIRATION RATE: 18 BRPM | TEMPERATURE: 98.1 F | BODY MASS INDEX: 28.34 KG/M2 | HEIGHT: 66 IN | DIASTOLIC BLOOD PRESSURE: 80 MMHG | OXYGEN SATURATION: 100 %

## 2021-05-15 VITALS
SYSTOLIC BLOOD PRESSURE: 124 MMHG | HEART RATE: 98 BPM | OXYGEN SATURATION: 100 % | TEMPERATURE: 97.6 F | BODY MASS INDEX: 28.45 KG/M2 | WEIGHT: 177 LBS | HEIGHT: 66 IN | DIASTOLIC BLOOD PRESSURE: 68 MMHG

## 2021-05-28 ENCOUNTER — HOSPITAL ENCOUNTER (OUTPATIENT)
Dept: GASTROENTEROLOGY | Facility: HOSPITAL | Age: 26
Discharge: HOME | End: 2021-05-28
Attending: NURSE PRACTITIONER

## 2021-05-28 ENCOUNTER — OFFICE VISIT CONVERTED (OUTPATIENT)
Dept: GASTROENTEROLOGY | Facility: HOSPITAL | Age: 26
End: 2021-05-28
Attending: NURSE PRACTITIONER

## 2021-05-28 LAB
ALBUMIN SERPL-MCNC: 4.4 G/DL (ref 3.5–5)
ALBUMIN/GLOB SERPL: 0.9 {RATIO} (ref 1.4–2.6)
ALP SERPL-CCNC: 79 U/L (ref 42–98)
ALT SERPL-CCNC: 23 U/L (ref 10–40)
ANION GAP SERPL CALC-SCNC: 13 MMOL/L (ref 8–19)
AST SERPL-CCNC: 21 U/L (ref 15–50)
BASOPHILS # BLD AUTO: 0.07 10*3/UL (ref 0–0.2)
BASOPHILS NFR BLD AUTO: 0.9 % (ref 0–3)
BILIRUB SERPL-MCNC: 0.28 MG/DL (ref 0.2–1.3)
BUN SERPL-MCNC: 13 MG/DL (ref 5–25)
BUN/CREAT SERPL: 15 {RATIO} (ref 6–20)
CALCIUM SERPL-MCNC: 9.8 MG/DL (ref 8.7–10.4)
CHLORIDE SERPL-SCNC: 102 MMOL/L (ref 99–111)
CONV ABS IMM GRAN: 0.05 10*3/UL (ref 0–0.2)
CONV CO2: 27 MMOL/L (ref 22–32)
CONV IMMATURE GRAN: 0.6 % (ref 0–1.8)
CONV TOTAL PROTEIN: 9.1 G/DL (ref 6.3–8.2)
CREAT UR-MCNC: 0.89 MG/DL (ref 0.5–0.9)
DEPRECATED RDW RBC AUTO: 42.8 FL (ref 36.4–46.3)
EOSINOPHIL # BLD AUTO: 0.18 10*3/UL (ref 0–0.7)
EOSINOPHIL # BLD AUTO: 2.3 % (ref 0–7)
ERYTHROCYTE [DISTWIDTH] IN BLOOD BY AUTOMATED COUNT: 12.6 % (ref 11.7–14.4)
ETHANOL BLD-MCNC: <10 MG/DL
GFR SERPLBLD BASED ON 1.73 SQ M-ARVRAT: >60 ML/MIN/{1.73_M2}
GLOBULIN UR ELPH-MCNC: 4.7 G/DL (ref 2–3.5)
GLUCOSE SERPL-MCNC: 83 MG/DL (ref 65–99)
HCT VFR BLD AUTO: 43.4 % (ref 37–47)
HGB BLD-MCNC: 14.4 G/DL (ref 12–16)
INR PPP: 0.98 (ref 2–3)
LYMPHOCYTES # BLD AUTO: 2.05 10*3/UL (ref 1–5)
LYMPHOCYTES NFR BLD AUTO: 25.9 % (ref 20–45)
MCH RBC QN AUTO: 30.8 PG (ref 27–31)
MCHC RBC AUTO-ENTMCNC: 33.2 G/DL (ref 33–37)
MCV RBC AUTO: 92.9 FL (ref 81–99)
MONOCYTES # BLD AUTO: 0.81 10*3/UL (ref 0.2–1.2)
MONOCYTES NFR BLD AUTO: 10.3 % (ref 3–10)
NEUTROPHILS # BLD AUTO: 4.74 10*3/UL (ref 2–8)
NEUTROPHILS NFR BLD AUTO: 60 % (ref 30–85)
NRBC CBCN: 0 % (ref 0–0.7)
OSMOLALITY SERPL CALC.SUM OF ELEC: 285 MOSM/KG (ref 273–304)
PLATELET # BLD AUTO: 330 10*3/UL (ref 130–400)
PMV BLD AUTO: 10.5 FL (ref 9.4–12.3)
POTASSIUM SERPL-SCNC: 4.3 MMOL/L (ref 3.5–5.3)
PROTHROMBIN TIME: 10.8 S (ref 9.4–12)
RBC # BLD AUTO: 4.67 10*6/UL (ref 4.2–5.4)
SODIUM SERPL-SCNC: 138 MMOL/L (ref 135–147)
WBC # BLD AUTO: 7.9 10*3/UL (ref 4.8–10.8)

## 2021-05-29 LAB
CONV HEPATITIS B SURFACE AG W CONFIRMATION RE: NEGATIVE
CONV HEPATITIS C TEST INFO: NORMAL
HAV IGM SERPL QL IA: NEGATIVE
HBV CORE AB SER DONR QL IA: NEGATIVE
HBV CORE IGM SERPL QL IA: NEGATIVE
HCV AB SER DONR QL: >11 S/CO RATIO (ref 0–0.9)
HCV RNA SERPL NAA+PROBE-ACNC: 210 IU/ML
HCV RNA SERPL NAA+PROBE-LOG IU: 2.32 LOG10 IU/ML

## 2021-05-31 LAB
BARBITURATES SERPLBLD QL: NEGATIVE NG/ML
CONV AMPHETAMINE SCREEN (SERUM): NEGATIVE NG/ML
CONV BENZODIAZEPINES SCREEN (SERUM): NEGATIVE NG/ML
CONV BUPRENORPHINE SCREEN (SERUM): NEGATIVE NG/ML
CONV CANNABINOIDS SCREEN (SERUM): NEGATIVE NG/ML
CONV COCAINE SCREEN (SERUM): NEGATIVE NG/ML
CONV METHAMPHETAMINE SCREEN (SERUM): NEGATIVE NG/ML
Lab: NORMAL
METHADONE BLD QL SCN: NEGATIVE NG/ML
OPIATES UR QL SCN: NEGATIVE NG/ML
OXYCODONE SERPL-MCNC: NEGATIVE NG/ML
PCP SPEC-MCNC: NEGATIVE NG/ML

## 2021-06-02 LAB
CONV FIBROSURE HCV: NORMAL
CONV HEPATITIS C GENOTYPE NOTE: NORMAL
HCV GENTYP SERPL NAA+PROBE: NORMAL

## 2021-06-06 VITALS
BODY MASS INDEX: 35.36 KG/M2 | HEIGHT: 66 IN | SYSTOLIC BLOOD PRESSURE: 134 MMHG | DIASTOLIC BLOOD PRESSURE: 69 MMHG | WEIGHT: 220 LBS

## 2021-06-06 NOTE — PROGRESS NOTES
Patient: MELANIA ALFARO     Acct: [id number]     Report: #[id number]  UNIT #: [id number]     : 1995    Encounter Date:2021  PRIMARY CARE:   ***Signed***  --------------------------------------------------------------------------------------------------------------------  DATE: 21      Jennifer Cheatham      Chief Complaint      HEPATITIS C W/O HEPATIC COMA            Allergies      Coded Allergies:             NO KNOWN ALLERGIES (Unverified , 21)            Medications      Last Reconciled on 21 11:50 by SUPA JULIAN      Glycopyrrolate (Glycopyrrolate) 1 Mg Tab      1 MG PO BID, TAB         Reported         21       Prazosin HCl (Prazosin HCl) 1 Mg Capsule      1 MG PO TID, #90 CAP 0 Refills         Reported         21       Ibuprofen (Ibuprofen) 600 Mg Tablet      600 MG PO Q6HR, #30 TAB 2 Refills         Prov: Jennifer Cheatham         21       Prazosin HCl (Prazosin HCl) 1 Mg Capsule      2 MG PO QDAY, #60 CAP 0 Refills         Reported         21       busPIRone HCl (Buspar) 10 Mg Tablet      20 MG PO TID, #180 TAB         Reported         21       Sertraline Hcl (Zoloft*) 100 Mg Tablet      150 MG PO QDAY for 30 Days, #45 TAB 0 Refills         Reported         21       buPROPion HCl XL (Wellbutrin XL) 150 Mg Tab.er.24h      300 MG PO QDAY for 30 Days, #60 TAB.ER.24H         Reported         21       valACYclovir (Valtrex) 500 Mg Tab      400 MG PO TID, TAB         Reported         21            Vitals      Height 5 ft 6 in / 167.64 cm      Weight 220 lbs  / 99.593816 kg      BSA 2.08 m2      BMI 35.5 kg/m2      Blood Pressure 134/69            Social History:  Tobacco Use (4-5 CIGS PER DAY), Alcohol Use (FORMER)      Smoking status:  Current every day smoker      Smoking history:  10-25 pack years      Counseling given:  Patient declined      Substance use:  Denies use      Medical History:  Yes: Hx Hepatitis (HEPATITIS C)       Psychiatric History:  Yes: Hx Depression, Hx Anxiety            PREVENTION      Hx Influenza Vaccination:  No      Influenza Vaccine Declined:  Yes      2 or More Falls in Past Year?:  No      Fall Past Year with Injury?:  No      Hx Pneumococcal Vaccination:  No            General:  No Fatigue, No Weight Loss      HEENT:  No Dysphagia, No Visual Changes      Respiratory:  No Cough, No Dyspnea      Cardiology:  No Chest Pain, No Palpitations      Gastrointestinal:  No Diarrhea, No Constipation      Genitourinary:  No Dysuria, No Frequency      Musculoskeletal:  No Joint Tenderness, No Joint Stiffness      Endocrine:  No Cold Intolerance, No Fatigue      Hematologic:  No Bleeding, No Bruising      Psychologic:  No Anxiety, No Depression      Neurologic:  No Confusion, No Weakness      Skin:  No Rash, No Open Wounds            Ms. Amos is a 26-year-old female who presents for evaluation and treatment of     chronic hepatitis C. Her significant past medical history includes anxiety and     depression which is currently being managed by primary care provider. She     reports being diagnosed with hepatitis C in 2018. Denies previous treatment.     Admits to history of illicit drug use. States that she has been clean for the     past 1 year. Denies alcohol abuse. She has a 4-month-old daughter whom she is     breast-feeding and plans to breast-feed until she is 12 months old.            HEENT:  Atraumatic; No Scleral Icterus      Lungs:  CTAB, Breathing is unlabored      Abdomen:  Normal BS all 4 Quadrants, Soft      Cardiovascular:  Regular Rate and Rhythm; No Murmur      Constitutional:  Healthy appearing; No Acute Distress      Neurological:  Mental Status WNL, Alert+Ox3      Musculoskeletal:  Normal Bulk Strength, Normal Tone      Skin:  No Rash, No Swelling      Rectal:  Deferred            Lab Results      6/29/2020 hepatitis B surface antigen-negative.      CBC: WBC eight, hemoglobin 12.5, hematocrit 38.2,  platelets 299.      HIV screen-nonreactive. Hepatitis C antibody-reactive.            Yumiko Stiffness Consistent with:  F0-F1      CAP Score:  Normal/Mild Liver Fat            Current Plan      Obtain labs today to confirm active infection and genotype. Discussed with     patient that treatment would need to be delayed until she is done breast-feeding    as medication is secreted in breast milk. She verbalized understanding and will     make follow-up in the future to complete treatment.      Chronic hepatitis C         Chronic hepatitis C without hepatic coma         Hepatic coma status: without hepatic coma            Notes      New Medications      * Prazosin HCl 1 MG CAPSULE: 1 MG PO TID #90      * Glycopyrrolate 1 MG TAB: 1 MG PO BID      New Diagnostics      * Acute Hepatitis Pane, Stat         Dx: Chronic hepatitis C - B18.2      * HCV RNA QUANTITATIVE HCPCR, Stat         Dx: Chronic hepatitis C - B18.2      * HEPATITIS C GENOTYPE PCR HEPCT, Stat         Dx: Chronic hepatitis C - B18.2      * Hepatitis B Core Ant, Stat         Dx: Chronic hepatitis C - B18.2      * Hepatitis B Titer, Stat         Dx: Chronic hepatitis C - B18.2      * Alcohol Blood/Ethano, Stat         Dx: Chronic hepatitis C - B18.2      * Drug Screen Serum (9, Stat         Dx: Chronic hepatitis C - B18.2      * CBC, Stat         Dx: Chronic hepatitis C - B18.2      * Comp Metabolic Panel, Stat         Dx: Chronic hepatitis C - B18.2      * Fibrosure Hcv, Stat         Dx: Chronic hepatitis C - B18.2      * Drug Screen Serum (9, Stat         Dx: Chronic hepatitis C - B18.2      * PT / INR, Stat         Dx: Chronic hepatitis C - B18.2      Patient Education Provided:  Yes      Patient Instructions:  Avoid Alcohol, Avoid Illicit Drug Use, Importance of     keeping appointments      Disposition:  Other            Electronically signed by Stacy Collins  05/28/2021 11:50       Disclaimer: Converted document may not contain table formatting  or lab diagrams. Please see [employer] for the authenticated document.

## 2021-06-08 ENCOUNTER — OFFICE VISIT (OUTPATIENT)
Dept: INTERNAL MEDICINE | Facility: CLINIC | Age: 26
End: 2021-06-08

## 2021-06-08 VITALS
OXYGEN SATURATION: 96 % | RESPIRATION RATE: 15 BRPM | WEIGHT: 234 LBS | SYSTOLIC BLOOD PRESSURE: 110 MMHG | HEART RATE: 89 BPM | HEIGHT: 66 IN | TEMPERATURE: 97.8 F | DIASTOLIC BLOOD PRESSURE: 70 MMHG | BODY MASS INDEX: 37.61 KG/M2

## 2021-06-08 DIAGNOSIS — L02.91 ABSCESS: Primary | ICD-10-CM

## 2021-06-08 PROCEDURE — 99213 OFFICE O/P EST LOW 20 MIN: CPT | Performed by: PHYSICIAN ASSISTANT

## 2021-06-08 PROCEDURE — 87205 SMEAR GRAM STAIN: CPT | Performed by: PHYSICIAN ASSISTANT

## 2021-06-08 PROCEDURE — 87070 CULTURE OTHR SPECIMN AEROBIC: CPT | Performed by: PHYSICIAN ASSISTANT

## 2021-06-08 PROCEDURE — 87147 CULTURE TYPE IMMUNOLOGIC: CPT | Performed by: PHYSICIAN ASSISTANT

## 2021-06-08 RX ORDER — PRAZOSIN HYDROCHLORIDE 1 MG/1
3 CAPSULE ORAL
COMMUNITY
Start: 2021-05-10 | End: 2022-12-05

## 2021-06-08 RX ORDER — SERTRALINE HYDROCHLORIDE 100 MG/1
200 TABLET, FILM COATED ORAL DAILY
COMMUNITY
Start: 2021-04-28

## 2021-06-08 RX ORDER — SULFAMETHOXAZOLE AND TRIMETHOPRIM 800; 160 MG/1; MG/1
1 TABLET ORAL 2 TIMES DAILY
Qty: 14 TABLET | Refills: 0 | Status: SHIPPED | OUTPATIENT
Start: 2021-06-08 | End: 2021-06-15

## 2021-06-08 RX ORDER — ACYCLOVIR 400 MG/1
400 TABLET ORAL 3 TIMES DAILY
COMMUNITY
Start: 2021-03-26

## 2021-06-08 RX ORDER — BUSPIRONE HYDROCHLORIDE 10 MG/1
20 TABLET ORAL 3 TIMES DAILY
COMMUNITY
Start: 2021-04-13

## 2021-06-08 RX ORDER — IBUPROFEN 600 MG/1
600 TABLET ORAL EVERY 6 HOURS
COMMUNITY
Start: 2021-05-28 | End: 2022-08-09

## 2021-06-08 RX ORDER — BUPROPION HYDROCHLORIDE 300 MG/1
300 TABLET ORAL DAILY
COMMUNITY
Start: 2021-05-28

## 2021-06-08 RX ORDER — GLYCOPYRROLATE 1 MG/1
1 TABLET ORAL
COMMUNITY
Start: 2021-05-22

## 2021-06-08 NOTE — PROGRESS NOTES
"Chief Complaint  hydradenitis (abscess to L axilla )    Subjective          Abbe Amos presents to [facility]  Pt here with c/o swelling, redness, pain under L armpit  She has hydradenitis but this is different.  Area very tender to touch  She has not had a fever.  She has noticed slight discharge with odor.      Objective   Vital Signs:   /70   Pulse 89   Temp 97.8 °F (36.6 °C)   Resp 15   Ht 167.6 cm (66\")   Wt 106 kg (234 lb)   SpO2 96%   BMI 37.77 kg/m²     Physical Exam  Vitals reviewed.   Constitutional:       Appearance: Normal appearance.   HENT:      Head: Normocephalic and atraumatic.      Nose: Nose normal.      Mouth/Throat:      Mouth: Mucous membranes are moist.   Eyes:      Extraocular Movements: Extraocular movements intact.      Conjunctiva/sclera: Conjunctivae normal.      Pupils: Pupils are equal, round, and reactive to light.   Cardiovascular:      Rate and Rhythm: Normal rate and regular rhythm.   Pulmonary:      Effort: Pulmonary effort is normal.      Breath sounds: Normal breath sounds.   Abdominal:      General: Abdomen is flat. Bowel sounds are normal.      Palpations: Abdomen is soft.   Musculoskeletal:         General: Normal range of motion.   Skin:     Findings: Lesion present.      Comments: Erythematous lesion- aprox 2in x 1/2in under L axilla, ttp, warm to touch, fluctuant.   Neurological:      General: No focal deficit present.      Mental Status: She is alert and oriented to person, place, and time.   Psychiatric:         Mood and Affect: Mood normal.        Result Review :          Incision & Drainage    Date/Time: 6/8/2021 2:00 PM  Performed by: Babs Muñoz PA-C  Authorized by: Babs Muñoz PA-C   Type: abscess  Location: left axilla.  Anesthesia: local infiltration    Anesthesia:  Local Anesthetic: lidocaine 1% with epinephrine  Scalpel size: 10  Needle gauge: 18  Incision type: single straight  Drainage: " purulent  Drainage amount: copious  Wound treatment: wound left open (packing placed)  Packing material: Vaseline gauze  Patient tolerance: patient tolerated the procedure well with no immediate complications            Assessment and Plan    Diagnoses and all orders for this visit:    1. Abscess (Primary)  Comments:  Pt signed consent. Abscess I&D successfully today without complication, pt tolerated procedure well. RTC 1 wk if sx not resolved. Pt understands and agrees  Orders:    -     Wound Culture - Wound, Axilla, Left    Other orders  -     sulfamethoxazole-trimethoprim (Bactrim DS) 800-160 MG per tablet; Take 1 tablet by mouth 2 (Two) Times a Day for 7 days.  Dispense: 14 tablet; Refill: 0  -     Incision & Drainage        Follow Up   Return in about 1 month (around 7/8/2021) for Follow Up.  Patient was given instructions and counseling regarding her condition or for health maintenance advice. Please see specific information pulled into the AVS if appropriate.

## 2021-06-09 NOTE — PATIENT INSTRUCTIONS
Skin Abscess    A skin abscess is an infected area on or under your skin that contains a collection of pus and other material. An abscess may also be called a furuncle, carbuncle, or boil. An abscess can occur in or on almost any part of your body.  Some abscesses break open (rupture) on their own. Most continue to get worse unless they are treated. The infection can spread deeper into the body and eventually into your blood, which can make you feel ill. Treatment usually involves draining the abscess.  What are the causes?  An abscess occurs when germs, like bacteria, pass through your skin and cause an infection. This may be caused by:  · A scrape or cut on your skin.  · A puncture wound through your skin, including a needle injection or insect bite.  · Blocked oil or sweat glands.  · Blocked and infected hair follicles.  · A cyst that forms beneath your skin (sebaceous cyst) and becomes infected.  What increases the risk?  This condition is more likely to develop in people who:  · Have a weak body defense system (immune system).  · Have diabetes.  · Have dry and irritated skin.  · Get frequent injections or use illegal IV drugs.  · Have a foreign body in a wound, such as a splinter.  · Have problems with their lymph system or veins.  What are the signs or symptoms?  Symptoms of this condition include:  · A painful, firm bump under the skin.  · A bump with pus at the top. This may break through the skin and drain.  Other symptoms include:  · Redness surrounding the abscess site.  · Warmth.  · Swelling of the lymph nodes (glands) near the abscess.  · Tenderness.  · A sore on the skin.  How is this diagnosed?  This condition may be diagnosed based on:  · A physical exam.  · Your medical history.  · A sample of pus. This may be used to find out what is causing the infection.  · Blood tests.  · Imaging tests, such as an ultrasound, CT scan, or MRI.  How is this treated?  A small abscess that drains on its own may not  need treatment. Treatment for larger abscesses may include:  · Moist heat or heat pack applied to the area several times a day.  · A procedure to drain the abscess (incision and drainage).  · Antibiotic medicines. For a severe abscess, you may first get antibiotics through an IV and then change to antibiotics by mouth.  Follow these instructions at home:  Medicines    · Take over-the-counter and prescription medicines only as told by your health care provider.  · If you were prescribed an antibiotic medicine, take it as told by your health care provider. Do not stop taking the antibiotic even if you start to feel better.  Abscess care    · If you have an abscess that has not drained, apply heat to the affected area. Use the heat source that your health care provider recommends, such as a moist heat pack or a heating pad.  ? Place a towel between your skin and the heat source.  ? Leave the heat on for 20-30 minutes.  ? Remove the heat if your skin turns bright red. This is especially important if you are unable to feel pain, heat, or cold. You may have a greater risk of getting burned.  · Follow instructions from your health care provider about how to take care of your abscess. Make sure you:  ? Cover the abscess with a bandage (dressing).  ? Change your dressing or gauze as told by your health care provider.  ? Wash your hands with soap and water before you change the dressing or gauze. If soap and water are not available, use hand .  · Check your abscess every day for signs of a worsening infection. Check for:  ? More redness, swelling, or pain.  ? More fluid or blood.  ? Warmth.  ? More pus or a bad smell.  General instructions  · To avoid spreading the infection:  ? Do not share personal care items, towels, or hot tubs with others.  ? Avoid making skin contact with other people.  · Keep all follow-up visits as told by your health care provider. This is important.  Contact a health care provider if you  have:  · More redness, swelling, or pain around your abscess.  · More fluid or blood coming from your abscess.  · Warm skin around your abscess.  · More pus or a bad smell coming from your abscess.  · A fever.  · Muscle aches.  · Chills or a general ill feeling.  Get help right away if you:  · Have severe pain.  · See red streaks on your skin spreading away from the abscess.  Summary  · A skin abscess is an infected area on or under your skin that contains a collection of pus and other material.  · A small abscess that drains on its own may not need treatment.  · Treatment for larger abscesses may include having a procedure to drain the abscess and taking an antibiotic.  This information is not intended to replace advice given to you by your health care provider. Make sure you discuss any questions you have with your health care provider.  Document Revised: 04/09/2020 Document Reviewed: 01/31/2019  [employer] Patient Education © 2021 [employer]

## 2021-06-10 LAB
BACTERIA SPEC AEROBE CULT: ABNORMAL
GRAM STN SPEC: ABNORMAL

## 2021-06-22 ENCOUNTER — TELEPHONE (OUTPATIENT)
Dept: INTERNAL MEDICINE | Facility: CLINIC | Age: 26
End: 2021-06-22

## 2021-06-22 NOTE — TELEPHONE ENCOUNTER
Caller: Abbe Amos    Relationship: Self    Best call back number: [phone]    What is the best time to reach you: AFTER NOON     Who are you requesting to speak with (clinical staff, provider,  specific staff member): MEDICAL STAFF    What was the call regarding: PATIENT HAS HAD STAFF INFECTION IN THE PAST. SHE WAS PRESCRIBED MEDICATION 2 WEEKS AGO. HER SYMPTOMS WERE GETTING BETTER. NOW THE SYMPTOMS ARE COMING BACK. PLEASE CALL PATIENT TO ADVISE.

## 2021-06-25 NOTE — TELEPHONE ENCOUNTER
Patient states that her symptoms are about the same, but not getting worse.  She thinks she will be ok until her next visit on 7/12/21.  Will call if symptoms worsen.

## 2021-07-12 ENCOUNTER — OFFICE VISIT (OUTPATIENT)
Dept: INTERNAL MEDICINE | Facility: CLINIC | Age: 26
End: 2021-07-12

## 2021-07-12 VITALS
WEIGHT: 241 LBS | RESPIRATION RATE: 15 BRPM | HEART RATE: 84 BPM | BODY MASS INDEX: 38.73 KG/M2 | OXYGEN SATURATION: 98 % | TEMPERATURE: 97.4 F | SYSTOLIC BLOOD PRESSURE: 110 MMHG | DIASTOLIC BLOOD PRESSURE: 68 MMHG | HEIGHT: 66 IN

## 2021-07-12 DIAGNOSIS — R20.0 NUMBNESS AND TINGLING IN RIGHT HAND: Primary | ICD-10-CM

## 2021-07-12 DIAGNOSIS — G56.01 CARPAL TUNNEL SYNDROME OF RIGHT WRIST: ICD-10-CM

## 2021-07-12 DIAGNOSIS — F17.210 CIGARETTE NICOTINE DEPENDENCE WITHOUT COMPLICATION: ICD-10-CM

## 2021-07-12 DIAGNOSIS — L73.2 HYDRADENITIS: ICD-10-CM

## 2021-07-12 DIAGNOSIS — F41.1 GENERALIZED ANXIETY DISORDER: ICD-10-CM

## 2021-07-12 DIAGNOSIS — R20.2 NUMBNESS AND TINGLING IN RIGHT HAND: Primary | ICD-10-CM

## 2021-07-12 DIAGNOSIS — F33.1 MAJOR DEPRESSIVE DISORDER, RECURRENT EPISODE, MODERATE DEGREE (HCC): ICD-10-CM

## 2021-07-12 DIAGNOSIS — F15.11 HISTORY OF METHAMPHETAMINE ABUSE (HCC): ICD-10-CM

## 2021-07-12 DIAGNOSIS — IMO0002 HISTORY OF SELF-HARM: ICD-10-CM

## 2021-07-12 PROCEDURE — 99213 OFFICE O/P EST LOW 20 MIN: CPT | Performed by: PHYSICIAN ASSISTANT

## 2021-07-12 RX ORDER — ARIPIPRAZOLE 2 MG/1
2 TABLET ORAL DAILY
COMMUNITY
Start: 2021-07-09 | End: 2021-10-13 | Stop reason: ALTCHOICE

## 2021-07-12 NOTE — PROGRESS NOTES
Chief Complaint  Follow-up and Numbness    Subjective          Abbe Amos presents to [facility]  Pt here for follow up    Depression: feeling down 1-2 days/wk  Able to push through  Seeing psych who manages her meds    Hand numbness: has numbness in R hand  At times it is in all different fingers  Sx worse when she is sleeping and putting pressure on her elbow  Sx lasting x months but worse over the past month  Sx worse at night, causes her to wake up at night   Denies hand swelling, redness or warmth to her joints.  Denies injury, trauma, accidents  Denies tremor, seizures, ha, vertigo, palpitations, sob, cp, dizziness, passing out, vision changes  Both hands hurt after using them for a while, like washing dishes, doing her hair  Denies neck pain.  She has not tried anything otc.  Pt works at a coffee shop.     Hydradenitisis:  Pt states areas under arms are tender still   Slightly improved after abx.      Past Medical History:   Diagnosis Date   • Anxiety    • Chemical dependency (CMS/[facility]) 06/2019   • Depression    • Hepatitis    • History of methamphetamine abuse (CMS/[facility]) 06/11/2020    Cont avoidance of drug use, especially now since pt is pregnant   • History of self-harm 06/11/2020   • Hydradenitis    • Hyperlipemia    • Nicotine dependence    • Night sweats    • Pregnancy    • Shortness of breath    • STD (sexually transmitted disease)    • Suicidal ideations 06/11/2020    Discussed suicidal thoughts. PT signed safety contract today. If thoughts return pt needs to call 911 or go immediately to ER for treatment. We will refer asap to Psychiatry for counseling. Discussed safety plan with pt. Discussed need to lock up all weapons and Meds in the home. Encouraged Pt to find things pt enjoys. Discussed different med options and side effects. RTC for re-evaluation.         History reviewed. No pertinent surgical history.     Current Outpatient Medications on File  "Prior to Visit   Medication Sig Dispense Refill   • acyclovir (ZOVIRAX) 400 MG tablet Take 400 mg by mouth 3 (Three) Times a Day.     • ARIPiprazole (ABILIFY) 2 MG tablet Take 2 mg by mouth Daily.     • buPROPion XL (WELLBUTRIN XL) 300 MG 24 hr tablet Take 300 mg by mouth Daily.     • busPIRone (BUSPAR) 10 MG tablet Take 20 mg by mouth 3 (Three) Times a Day.     • glycopyrrolate (ROBINUL) 1 MG tablet Take 1 mg by mouth every night at bedtime.     • ibuprofen (ADVIL,MOTRIN) 600 MG tablet Take 600 mg by mouth Every 6 (Six) Hours.     • prazosin (MINIPRESS) 1 MG capsule Take 3 mg by mouth every night at bedtime.     • sertraline (ZOLOFT) 100 MG tablet TAKE 2 TABLETS BY MOUTH EVERY DAY FOR ANXIETY AND DEPRESSION       No current facility-administered medications on file prior to visit.        No Known Allergies    Social History     Tobacco Use   Smoking Status Current Every Day Smoker   • Packs/day: 0.50   • Years: 15.00   • Pack years: 7.50   Smokeless Tobacco Never Used   Tobacco Comment    Smoked 11-20 years          Objective   Vital Signs:   /68   Pulse 84   Temp 97.4 °F (36.3 °C)   Resp 15   Ht 167.6 cm (66\")   Wt 109 kg (241 lb)   SpO2 98%   BMI 38.90 kg/m²     Physical Exam  Vitals reviewed.   Constitutional:       Appearance: Normal appearance.   HENT:      Head: Normocephalic and atraumatic.      Nose: Nose normal.      Mouth/Throat:      Mouth: Mucous membranes are moist.   Eyes:      Extraocular Movements: Extraocular movements intact.      Conjunctiva/sclera: Conjunctivae normal.      Pupils: Pupils are equal, round, and reactive to light.   Cardiovascular:      Rate and Rhythm: Normal rate and regular rhythm.   Pulmonary:      Effort: Pulmonary effort is normal.      Breath sounds: Normal breath sounds.   Abdominal:      General: Abdomen is flat. Bowel sounds are normal.      Palpations: Abdomen is soft.   Musculoskeletal:         General: Normal range of motion.      Comments: Positive " phalens sign   Neurological:      General: No focal deficit present.      Mental Status: She is alert and oriented to person, place, and time.   Psychiatric:         Mood and Affect: Mood normal.        Result Review :                 Assessment and Plan    Diagnoses and all orders for this visit:    1. Numbness and tingling in right hand (Primary)    2. Major depressive disorder, recurrent episode, moderate degree (CMS/HCC)    3. Cigarette nicotine dependence without complication    4. Hydradenitis  Comments:  Referal placed to surgery.    5. History of methamphetamine abuse (CMS/HCC)    6. History of self-harm    7. Generalized anxiety disorder  Assessment & Plan:  Mood stable, pt will cont to see psych for follow up and med mgmt      8. Carpal tunnel syndrome of right wrist  Comments:  Instructed pt to wear brace on her rt hand at night, and during the day as needed. Recomended taking 600mg of ibuprofen at night to alleviate pain. Gave pt exercise handout to do daily to help with symptoms. Patient will let us know if sx not improved within 2-4 weeks and we will discuss EMG or referral.    Other orders  -     Ambulatory Referral to General Surgery      Follow Up   Return in about 6 weeks (around 8/23/2021).  Patient was given instructions and counseling regarding her condition or for health maintenance advice. Please see specific information pulled into the AVS if appropriate.

## 2021-07-12 NOTE — PATIENT INSTRUCTIONS
Carpal Tunnel Syndrome    Carpal tunnel syndrome is a condition that causes pain in your hand and arm. The carpal tunnel is a narrow area located on the palm side of your wrist. Repeated wrist motion or certain diseases may cause swelling within the tunnel. This swelling pinches the main nerve in the wrist (median nerve).  What are the causes?  This condition may be caused by:  · Repeated wrist motions.  · Wrist injuries.  · Arthritis.  · A cyst or tumor in the carpal tunnel.  · Fluid buildup during pregnancy.  Sometimes the cause of this condition is not known.  What increases the risk?  The following factors may make you more likely to develop this condition:  · Having a job, such as being a  or a , that requires you to repeatedly move your wrist in the same motion.  · Being a woman.  · Having certain conditions, such as:  ? Diabetes.  ? Obesity.  ? An underactive thyroid (hypothyroidism).  ? Kidney failure.  What are the signs or symptoms?  Symptoms of this condition include:  · A tingling feeling in your fingers, especially in your thumb, index, and middle fingers.  · Tingling or numbness in your hand.  · An aching feeling in your entire arm, especially when your wrist and elbow are bent for a long time.  · Wrist pain that goes up your arm to your shoulder.  · Pain that goes down into your palm or fingers.  · A weak feeling in your hands. You may have trouble grabbing and holding items.  Your symptoms may feel worse during the night.  How is this diagnosed?  This condition is diagnosed with a medical history and physical exam. You may also have tests, including:  · Electromyogram (EMG). This test measures electrical signals sent by your nerves into the muscles.  · Nerve conduction study. This test measures how well electrical signals pass through your nerves.  · Imaging tests, such as X-rays, ultrasound, and MRI. These tests check for possible causes of your condition.  How is this treated?  This  condition may be treated with:  · Lifestyle changes. It is important to stop or change the activity that caused your condition.  · Doing exercise and activities to strengthen your muscles and bones (physical therapy).  · Learning how to use your hand again after diagnosis (occupational therapy).  · Medicines for pain and inflammation. This may include medicine that is injected into your wrist.  · A wrist splint.  · Surgery.  Follow these instructions at home:  If you have a splint:  · Wear the splint as told by your health care provider. Remove it only as told by your health care provider.  · Loosen the splint if your fingers tingle, become numb, or turn cold and blue.  · Keep the splint clean.  · If the splint is not waterproof:  ? Do not let it get wet.  ? Cover it with a watertight covering when you take a bath or shower.  Managing pain, stiffness, and swelling    · If directed, put ice on the painful area:  ? If you have a removable splint, remove it as told by your health care provider.  ? Put ice in a plastic bag.  ? Place a towel between your skin and the bag.  ? Leave the ice on for 20 minutes, 2-3 times per day.  General instructions  · Take over-the-counter and prescription medicines only as told by your health care provider.  · Rest your wrist from any activity that may be causing your pain. If your condition is work related, talk with your employer about changes that can be made, such as getting a wrist pad to use while typing.  · Do any exercises as told by your health care provider, physical therapist, or occupational therapist.  · Keep all follow-up visits as told by your health care provider. This is important.  Contact a health care provider if:  · You have new symptoms.  · Your pain is not controlled with medicines.  · Your symptoms get worse.  Get help right away if:  · You have severe numbness or tingling in your wrist or hand.  Summary  · Carpal tunnel syndrome is a condition that causes pain in  your hand and arm.  · It is usually caused by repeated wrist motions.  · Lifestyle changes and medicines are used to treat carpal tunnel syndrome. Surgery may be recommended.  · Follow your health care provider's instructions about wearing a splint, resting from activity, keeping follow-up visits, and calling for help.  This information is not intended to replace advice given to you by your health care provider. Make sure you discuss any questions you have with your health care provider.  Document Revised: 04/26/2019 Document Reviewed: 04/26/2019  Elsevier Patient Education © 2021 [employer]

## 2021-07-28 ENCOUNTER — OFFICE VISIT (OUTPATIENT)
Dept: SURGERY | Facility: CLINIC | Age: 26
End: 2021-07-28

## 2021-07-28 VITALS — BODY MASS INDEX: 39.53 KG/M2 | WEIGHT: 246 LBS | RESPIRATION RATE: 16 BRPM | HEIGHT: 66 IN

## 2021-07-28 DIAGNOSIS — Z72.0 TOBACCO ABUSE: ICD-10-CM

## 2021-07-28 DIAGNOSIS — L73.2 HIDRADENITIS SUPPURATIVA: Primary | ICD-10-CM

## 2021-07-28 PROCEDURE — 99203 OFFICE O/P NEW LOW 30 MIN: CPT | Performed by: SURGERY

## 2021-07-28 PROCEDURE — 99406 BEHAV CHNG SMOKING 3-10 MIN: CPT | Performed by: SURGERY

## 2021-07-29 NOTE — PROGRESS NOTES
General Surgery/Colorectal Surgery Note    Patient Name:  Abbe Amos  YOB: 1995  [id number]    Referring Provider: Babs Muñoz PA-C      Patient Care Team:  Babs Muñoz PA-C as PCP - General (Physician Assistant)    Chief complaint axillary pain     Subjective .     History of present illness: She has a history of hidradenitis currently on no therapy.  She smokes daily.  History of this for 5 to 6 years with previous multiple abscess to bilateral axilla with staph infection.  No history of MRSA.  Recent antibiotic course.  Recent incision and drainage left axilla.  No fever.  No alleviating factors.      History:  Past Medical History:   Diagnosis Date   • Anxiety    • Chemical dependency (CMS/[facility]) 06/2019   • Depression    • Hepatitis    • History of methamphetamine abuse (CMS/[facility]) 06/11/2020    Cont avoidance of drug use, especially now since pt is pregnant   • History of self-harm 06/11/2020   • Hydradenitis    • Hyperlipemia    • Nicotine dependence    • Night sweats    • Pregnancy    • Shortness of breath    • STD (sexually transmitted disease)    • Suicidal ideations 06/11/2020    Discussed suicidal thoughts. PT signed safety contract today. If thoughts return pt needs to call 911 or go immediately to ER for treatment. We will refer asap to Psychiatry for counseling. Discussed safety plan with pt. Discussed need to lock up all weapons and Meds in the home. Encouraged Pt to find things pt enjoys. Discussed different med options and side effects. RTC for re-evaluation.        History reviewed. No pertinent surgical history.    Family History   Problem Relation Age of Onset   • Thyroid cancer Mother         Gland, Malignant   • Cancer Mother        Social History     Tobacco Use   • Smoking status: Current Every Day Smoker     Packs/day: 0.50     Years: 15.00     Pack years: 7.50   • Smokeless tobacco: Never Used   • Tobacco comment: Smoked 11-20 years   Vaping Use   • Vaping Use:  Never used   Substance Use Topics   • Alcohol use: Never   • Drug use: Not Currently     Comment: Former, Meth Yessi,3,2019       Review of Systems  All systems were reviewed and negative except for:   Review of Systems   Constitutional: Negative for chills, fever and unexpected weight loss.   HENT: Negative for congestion, nosebleeds and voice change.    Eyes: Negative for blurred vision, double vision and discharge.   Respiratory: Negative for apnea, chest tightness and shortness of breath.    Cardiovascular: Negative for chest pain and leg swelling.   Gastrointestinal:        See HPI   Endocrine: Negative for cold intolerance and heat intolerance.   Genitourinary: Negative for dysuria, hematuria and urgency.   Musculoskeletal: Negative for back pain, joint swelling and neck pain.   Skin: Negative for color change and dry skin.   Neurological: Negative for dizziness and confusion.   Hematological: Negative for adenopathy.   Psychiatric/Behavioral: Negative for agitation and behavioral problems.     MEDS:  Prior to Admission medications    Medication Sig Start Date End Date Taking? Authorizing Provider   acyclovir (ZOVIRAX) 400 MG tablet Take 400 mg by mouth 3 (Three) Times a Day. 3/26/21  Yes ProviderPritesh MD   ARIPiprazole (ABILIFY) 2 MG tablet Take 2 mg by mouth Daily. 7/9/21  Yes ProviderPritesh MD   buPROPion XL (WELLBUTRIN XL) 300 MG 24 hr tablet Take 300 mg by mouth Daily. 5/28/21  Yes ProviderPritesh MD   busPIRone (BUSPAR) 10 MG tablet Take 20 mg by mouth 3 (Three) Times a Day. 4/13/21  Yes ProviderPritesh MD   glycopyrrolate (ROBINUL) 1 MG tablet Take 1 mg by mouth every night at bedtime. 5/22/21  Yes ProviderPritesh MD   ibuprofen (ADVIL,MOTRIN) 600 MG tablet Take 600 mg by mouth Every 6 (Six) Hours. 5/28/21  Yes ProviderPritesh MD   prazosin (MINIPRESS) 1 MG capsule Take 3 mg by mouth every night at bedtime. 5/10/21  Yes Pritesh Rajput MD   sertraline (ZOLOFT)  "100 MG tablet TAKE 2 TABLETS BY MOUTH EVERY DAY FOR ANXIETY AND DEPRESSION 4/28/21  Yes Pritesh Rajput MD   ID Now COVID-19 kit See Admin Instructions. for testing 5/15/21   Pritesh Rajput MD   ondansetron ODT (ZOFRAN-ODT) 4 MG disintegrating tablet DISSOLVE 1 TABLET ON THE TONGUE THREE TIMES DAILY AS NEEDED 5/23/21   Pritesh Rajput MD        Allergies:  Patient has no known allergies.    Objective     Vital Signs   Resp:  [16] 16    Physical Exam:     General Appearance:    Alert, cooperative, in no acute distress   Head:    Normocephalic, without obvious abnormality, atraumatic   Eyes:          Conjunctivae and sclerae normal, no icterus,     Ears:    Ears appear intact with no abnormalities noted   Throat:   No oral lesions, no thrush, oral mucosa moist   Neck:   No adenopathy, supple, trachea midline, no thyromegaly   Back:     No kyphosis present, no scoliosis present, no skin lesions,      erythema or scars, no tenderness to percussion or                   palpation,   range of motion normal   Lungs:     Clear to auscultation,respirations regular, even and                  unlabored    Heart:    Regular rhythm and normal rate, normal S1 and S2, no            murmur, no gallop, no rub, no click   Chest Wall:    No abnormalities observed   Abdomen:     Normal bowel sounds, no masses, no organomegaly, soft        non-tender, non-distended, no guarding, no rebound                tenderness   Rectal:        Extremities:   Moves all extremities well, no edema, no cyanosis, no             redness   Pulses:   Pulses palpable and equal bilaterally   Skin:   No bleeding, bruising or rash, scarring bilateral axilla consistent with   Lymph nodes:   No palpable adenopathy   Neurologic:   A/o x 4 with no deficits       Results Review:   {Results Review:99993::\"I reviewed the patient's new clinical results.\"    LABS/IMAGING:  Results for orders placed or performed in visit on 06/08/21   Wound Culture - " Wound, Axilla, Left    Specimen: Axilla, Left; Wound   Result Value Ref Range    Wound Culture (A)      Scant growth (1+) Staphylococcus, coagulase negative    Gram Stain Few (2+) WBCs per low power field     Gram Stain Rare (1+) Gram positive cocci     Gram Stain Rare (1+) Gram negative bacilli, extracellular         Result Review :     Assessment/Plan       hidradenitis bilateral axilla with no evidence of active infection or abscess    I had a discussion with the patient.  I do not recommend any surgical intervention at this time.  I stressed the importance of stopping smoking.  Smoking cessation counseling discussed.  I will give her nicotine gum.  I recommended referral to dermatology for discussion of treatment of her skin disorder.  I encouraged her to use Hibiclens soap to her axilla.  Follow-up with me for acute infection.  All questions answered.  She agrees with the plan.  Orders placed.    Abbe Amos  reports that she has been smoking. She has a 7.50 pack-year smoking history. She has never used smokeless tobacco.. I have educated her on the risk of diseases from using tobacco products such as negative impact this has on her skin disorder  I advised her to quit     I spent 3  minutes counseling the patient.            Serg Sterling MD  07/29/21 07:41 EDT

## 2021-08-23 ENCOUNTER — OFFICE VISIT (OUTPATIENT)
Dept: INTERNAL MEDICINE | Facility: CLINIC | Age: 26
End: 2021-08-23

## 2021-08-23 VITALS
HEIGHT: 66 IN | HEART RATE: 83 BPM | OXYGEN SATURATION: 96 % | RESPIRATION RATE: 18 BRPM | SYSTOLIC BLOOD PRESSURE: 134 MMHG | DIASTOLIC BLOOD PRESSURE: 80 MMHG | WEIGHT: 254 LBS | BODY MASS INDEX: 40.82 KG/M2 | TEMPERATURE: 98.3 F

## 2021-08-23 DIAGNOSIS — F17.210 CIGARETTE NICOTINE DEPENDENCE WITHOUT COMPLICATION: Primary | ICD-10-CM

## 2021-08-23 DIAGNOSIS — R20.0 BILATERAL HAND NUMBNESS: ICD-10-CM

## 2021-08-23 DIAGNOSIS — L73.2 HIDRADENITIS SUPPURATIVA: ICD-10-CM

## 2021-08-23 PROCEDURE — 99213 OFFICE O/P EST LOW 20 MIN: CPT | Performed by: PHYSICIAN ASSISTANT

## 2021-08-23 NOTE — PROGRESS NOTES
Chief Complaint  Wrist Pain and Follow-up    Subjective          Abbe Amos presents to [facility]  Wrist numbness: bilaterally but R>L  She tried ibu and wearing braces but felt more uncomfortable than helped  She did not try home exercises but would like referral to PT  Denies neck pain    Smoking cessation: pt quit smoking 2 wks ago  She has been using patches 3-4x/day feels breakthrough are happening  She would like patches instead.    Hydroadenitis: she was seen by gen surgery who referred her to derm  She was told she did not need surgical intervention at this time      Past Medical History:   Diagnosis Date   • Anxiety    • Chemical dependency (CMS/[facility]) 06/2019   • Depression    • Hepatitis    • History of methamphetamine abuse (CMS/[facility]) 06/11/2020    Cont avoidance of drug use, especially now since pt is pregnant   • History of self-harm 06/11/2020   • Hydradenitis    • Hyperlipemia    • Nicotine dependence    • Night sweats    • Pregnancy    • Shortness of breath    • STD (sexually transmitted disease)    • Suicidal ideations 06/11/2020    Discussed suicidal thoughts. PT signed safety contract today. If thoughts return pt needs to call 911 or go immediately to ER for treatment. We will refer asap to Psychiatry for counseling. Discussed safety plan with pt. Discussed need to lock up all weapons and Meds in the home. Encouraged Pt to find things pt enjoys. Discussed different med options and side effects. RTC for re-evaluation.         History reviewed. No pertinent surgical history.     Current Outpatient Medications on File Prior to Visit   Medication Sig Dispense Refill   • acyclovir (ZOVIRAX) 400 MG tablet Take 400 mg by mouth 3 (Three) Times a Day.     • ARIPiprazole (ABILIFY) 2 MG tablet Take 2 mg by mouth Daily.     • buPROPion XL (WELLBUTRIN XL) 300 MG 24 hr tablet Take 300 mg by mouth Daily.     • busPIRone (BUSPAR) 10 MG tablet Take 20 mg by mouth 3  "(Three) Times a Day.     • glycopyrrolate (ROBINUL) 1 MG tablet Take 1 mg by mouth every night at bedtime.     • ibuprofen (ADVIL,MOTRIN) 600 MG tablet Take 600 mg by mouth Every 6 (Six) Hours.     • nicotine polacrilex (NICORETTE) 4 MG gum Chew 1 each As Needed for Smoking Cessation. 30 each 2   • prazosin (MINIPRESS) 1 MG capsule Take 3 mg by mouth every night at bedtime.     • sertraline (ZOLOFT) 100 MG tablet TAKE 2 TABLETS BY MOUTH EVERY DAY FOR ANXIETY AND DEPRESSION     • ID Now COVID-19 kit See Admin Instructions. for testing     • ondansetron ODT (ZOFRAN-ODT) 4 MG disintegrating tablet DISSOLVE 1 TABLET ON THE TONGUE THREE TIMES DAILY AS NEEDED       No current facility-administered medications on file prior to visit.        No Known Allergies    Social History     Tobacco Use   Smoking Status Current Every Day Smoker   • Packs/day: 0.50   • Years: 15.00   • Pack years: 7.50   Smokeless Tobacco Never Used   Tobacco Comment    Smoked 11-20 years          Objective   Vital Signs:   /80   Pulse 83   Temp 98.3 °F (36.8 °C)   Resp 18   Ht 167.6 cm (66\")   Wt 115 kg (254 lb)   SpO2 96%   BMI 41.00 kg/m²     Physical Exam  Vitals reviewed.   Constitutional:       Appearance: Normal appearance.   HENT:      Head: Normocephalic and atraumatic.      Nose: Nose normal.      Mouth/Throat:      Mouth: Mucous membranes are moist.   Eyes:      Extraocular Movements: Extraocular movements intact.      Conjunctiva/sclera: Conjunctivae normal.      Pupils: Pupils are equal, round, and reactive to light.   Cardiovascular:      Rate and Rhythm: Normal rate and regular rhythm.   Pulmonary:      Effort: Pulmonary effort is normal.      Breath sounds: Normal breath sounds.   Abdominal:      General: Abdomen is flat. Bowel sounds are normal.      Palpations: Abdomen is soft.   Musculoskeletal:         General: Normal range of motion.   Neurological:      General: No focal deficit present.      Mental Status: She is " alert and oriented to person, place, and time.   Psychiatric:         Mood and Affect: Mood normal.        Result Review :                 Assessment and Plan    Diagnoses and all orders for this visit:    1. Cigarette nicotine dependence without complication (Primary)  Assessment & Plan:  Cont cessation, will start patches to help with cessation cravings      2. Bilateral hand numbness  Assessment & Plan:  Referral placed to PT per pt request since she does not think about doing exercises at home. EMG ordered to eval carpal tunnel, will refer to neuro if needed based on results.    Orders:  -     EMG & Nerve Conduction Test; Future  -     Ambulatory Referral to Physical Therapy    3. Hidradenitis suppurativa  Assessment & Plan:  Reviewed gen surg note, referral placed to derm for tx.    Orders:  -     Ambulatory Referral to Dermatology    Other orders  -     nicotine (Nicoderm CQ) 7 MG/24HR patch; Place 1 patch on the skin as directed by provider Daily.  Dispense: 30 patch; Refill: 3      Follow Up   Return in about 3 months (around 11/23/2021).  Patient was given instructions and counseling regarding her condition or for health maintenance advice. Please see specific information pulled into the AVS if appropriate.

## 2021-08-24 PROBLEM — R20.0 BILATERAL HAND NUMBNESS: Status: ACTIVE | Noted: 2021-08-24

## 2021-08-24 NOTE — PATIENT INSTRUCTIONS
Steps to Quit Smoking  Smoking tobacco is the leading cause of preventable death. It can affect almost every organ in the body. Smoking puts you and people around you at risk for many serious, long-lasting (chronic) diseases. Quitting smoking can be hard, but it is one of the best things that you can do for your health. It is never too late to quit.  How do I get ready to quit?  When you decide to quit smoking, make a plan to help you succeed. Before you quit:  · Pick a date to quit. Set a date within the next 2 weeks to give you time to prepare.  · Write down the reasons why you are quitting. Keep this list in places where you will see it often.  · Tell your family, friends, and co-workers that you are quitting. Their support is important.  · Talk with your doctor about the choices that may help you quit.  · Find out if your health insurance will pay for these treatments.  · Know the people, places, things, and activities that make you want to smoke (triggers). Avoid them.  What first steps can I take to quit smoking?  · Throw away all cigarettes at home, at work, and in your car.  · Throw away the things that you use when you smoke, such as ashtrays and lighters.  · Clean your car. Make sure to empty the ashtray.  · Clean your home, including curtains and carpets.  What can I do to help me quit smoking?  Talk with your doctor about taking medicines and seeing a counselor at the same time. You are more likely to succeed when you do both.  · If you are pregnant or breastfeeding, talk with your doctor about counseling or other ways to quit smoking. Do not take medicine to help you quit smoking unless your doctor tells you to do so.  To quit smoking:  Quit right away  · Quit smoking totally, instead of slowly cutting back on how much you smoke over a period of time.  · Go to counseling. You are more likely to quit if you go to counseling sessions regularly.  Take medicine  You may take medicines to help you quit. Some  medicines need a prescription, and some you can buy over-the-counter. Some medicines may contain a drug called nicotine to replace the nicotine in cigarettes. Medicines may:  · Help you to stop having the desire to smoke (cravings).  · Help to stop the problems that come when you stop smoking (withdrawal symptoms).  Your doctor may ask you to use:  · Nicotine patches, gum, or lozenges.  · Nicotine inhalers or sprays.  · Non-nicotine medicine that is taken by mouth.  Find resources  Find resources and other ways to help you quit smoking and remain smoke-free after you quit. These resources are most helpful when you use them often. They include:  · Online chats with a counselor.  · Phone quitlines.  · Printed self-help materials.  · Support groups or group counseling.  · Text messaging programs.  · Mobile phone apps. Use apps on your mobile phone or tablet that can help you stick to your quit plan. There are many free apps for mobile phones and tablets as well as websites. Examples include Quit Guide from the CDC and smokefree.gov    What things can I do to make it easier to quit?    · Talk to your family and friends. Ask them to support and encourage you.  · Call a phone quitline (8-284-QUITNOW), reach out to support groups, or work with a counselor.  · Ask people who smoke to not smoke around you.  · Avoid places that make you want to smoke, such as:  ? Bars.  ? Parties.  ? Smoke-break areas at work.  · Spend time with people who do not smoke.  · Lower the stress in your life. Stress can make you want to smoke. Try these things to help your stress:  ? Getting regular exercise.  ? Doing deep-breathing exercises.  ? Doing yoga.  ? Meditating.  ? Doing a body scan. To do this, close your eyes, focus on one area of your body at a time from head to toe. Notice which parts of your body are tense. Try to relax the muscles in those areas.  How will I feel when I quit smoking?  Day 1 to 3 weeks  Within the first 24 hours,  you may start to have some problems that come from quitting tobacco. These problems are very bad 2-3 days after you quit, but they do not often last for more than 2-3 weeks. You may get these symptoms:  · Mood swings.  · Feeling restless, nervous, angry, or annoyed.  · Trouble concentrating.  · Dizziness.  · Strong desire for high-sugar foods and nicotine.  · Weight gain.  · Trouble pooping (constipation).  · Feeling like you may vomit (nausea).  · Coughing or a sore throat.  · Changes in how the medicines that you take for other issues work in your body.  · Depression.  · Trouble sleeping (insomnia).  Week 3 and afterward  After the first 2-3 weeks of quitting, you may start to notice more positive results, such as:  · Better sense of smell and taste.  · Less coughing and sore throat.  · Slower heart rate.  · Lower blood pressure.  · Clearer skin.  · Better breathing.  · Fewer sick days.  Quitting smoking can be hard. Do not give up if you fail the first time. Some people need to try a few times before they succeed. Do your best to stick to your quit plan, and talk with your doctor if you have any questions or concerns.  Summary  · Smoking tobacco is the leading cause of preventable death. Quitting smoking can be hard, but it is one of the best things that you can do for your health.  · When you decide to quit smoking, make a plan to help you succeed.  · Quit smoking right away, not slowly over a period of time.  · When you start quitting, seek help from your doctor, family, or friends.  This information is not intended to replace advice given to you by your health care provider. Make sure you discuss any questions you have with your health care provider.  Document Revised: 09/11/2020 Document Reviewed: 03/07/2020  Elsevier Patient Education © 2021 [employer]

## 2021-09-10 ENCOUNTER — TELEPHONE (OUTPATIENT)
Dept: INTERNAL MEDICINE | Facility: CLINIC | Age: 26
End: 2021-09-10

## 2021-09-20 ENCOUNTER — TREATMENT (OUTPATIENT)
Dept: PHYSICAL THERAPY | Facility: CLINIC | Age: 26
End: 2021-09-20

## 2021-09-20 DIAGNOSIS — R20.2 NUMBNESS AND TINGLING IN BOTH HANDS: Primary | ICD-10-CM

## 2021-09-20 DIAGNOSIS — R20.0 NUMBNESS AND TINGLING IN BOTH HANDS: Primary | ICD-10-CM

## 2021-09-20 DIAGNOSIS — M79.642 BILATERAL HAND PAIN: ICD-10-CM

## 2021-09-20 DIAGNOSIS — M79.641 BILATERAL HAND PAIN: ICD-10-CM

## 2021-09-20 PROCEDURE — 97165 OT EVAL LOW COMPLEX 30 MIN: CPT | Performed by: OCCUPATIONAL THERAPIST

## 2021-09-20 NOTE — PROGRESS NOTES
Occupational Therapy Initial Evaluation and Plan of Care    Patient: Abbe Amos   : 1995  Diagnosis/ICD-10 Code:  Numbness and tingling in both hands [R20.0, R20.2]  Referring practitioner: Babs Muñoz PA-C  Date of Initial Visit: 2021  Today's Date: 2021  Patient seen for 1 sessions           Subjective Questionnaire: QuickDASH: 27/55 (20-39%)      Subjective Evaluation    History of Present Illness  Mechanism of injury: Pt is a 25 y/o RHD female who reports numbness, tingling and pain in B hands that has been more noticeable since giving birth to daughter in 2021. Pt reports symptoms are worse at night than during the day.      Patient Occupation: [employer]  Current pain ratin  At best pain ratin  At worst pain ratin  Location: B hands (right worse than left)  Quality: burning and sharp  Progression: improved    Social Support  Lives with: young children    Hand dominance: right    Treatments  Previous treatment: immobilization  Patient Goals  Patient goals for therapy: decreased pain, independence with ADLs/IADLs and return to sport/leisure activities             Objective          Active Range of Motion   Left Shoulder   Normal active range of motion    Right Shoulder   Normal active range of motion    Left Elbow   Normal active range of motion    Right Elbow   Normal active range of motion    Left Wrist   Normal active range of motion    Right Wrist   Normal active range of motion    Additional Active Range of Motion Details  WNL all digits B hands.    Strength/Myotome Testing     Left Shoulder   Normal muscle strength    Right Shoulder   Normal muscle strength    Left Elbow   Normal strength    Right Elbow   Normal strength    Left Wrist/Hand   Normal wrist strength     (2nd hand position)    Trial 1: 73 lbs    Trial 2: 83 lbs    Trial 3: 85 lbs    Average: 80.33 lbs    Right Wrist/Hand   Normal wrist strength     (2nd hand position)     Trial 1: 70 lbs     Trial 2: 50 lbs    Trial 3: 55 lbs    Average: 58.33 lbs    Additional Strength Details  Pt reports she has been using her left hand more with ADLs secondary to a previous 5th metacarpal shaft fracture on the right hand.    Tests     Left Elbow   Negative Tinel's sign (cubital tunnel).     Right Elbow   Negative Tinel's sign (cubital tunnel).     Left Wrist/Hand   Negative Phalen's sign and Tinel's sign (medial nerve).     Right Wrist/Hand   Negative Phalen's sign and Tinel's sign (medial nerve).     Additional Tests Details  Negative ulnar tension test on right; positive ulnar tension test on left.          Assessment & Plan     Assessment  Impairments: activity intolerance, impaired physical strength, lacks appropriate home exercise program and pain with function  Assessment details: Pt will benefit from skilled OT secondary to decreased sensation and increased pain in B hands that limits pt ability to complete ADLs.  Prognosis: fair  Functional Limitations: carrying objects, lifting, sleeping, pulling, pushing, uncomfortable because of pain and unable to perform repetitive tasks  Goals  Plan Goals: The patient complains of pain in B hands.  LTG 1  6 weeks:  The patient will report a pain rating of 0/10 in order to improve sleep quality and tolerance to performance of activities of daily living.   Status: New    2.  The patient reports numbness and tingling in B hands.  LTG 2  6 weeks:  The patient will report 50% less numbness and tingling in B hands in order to grasp and manipulate objects and improve sleep.  Status: New  STG 2  4 weeks:  The patient will report 25% less numbness and tingling.  Status:  New    3.  Carrying, moving, and handling objects functional limitation.  LTG 3  6 weeks:  The patient will demonstrate 1-19 % limitation by achieving a score of 19 on the Quick DASH.  Status: New    Plan  Planned modality interventions: electrical stimulation/Russian stimulation and thermotherapy (hydrocollator  packs)  Other planned modality interventions: fluidotherapy  Planned therapy interventions: body mechanics training, functional ROM exercises, home exercise program, manual therapy, neuromuscular re-education, postural training, soft tissue mobilization, strengthening and stretching  Frequency: 2x week  Duration in weeks: 12  Treatment plan discussed with: patient      Pt is indicated for skilled occupational therapy services.  Timed:         Manual Therapy:    0     mins  78813;     Therapeutic Exercise:    0     mins  22192;     Neuromuscular Jesús:    0    mins  43364;    Therapeutic Activity:     0     mins  80124;     Ultrasound:     0     mins  64061;    Self-care  __0__ mins 87156    Un-Timed:  Electrical Stimulation:    0     mins  89564 ( );  Low Eval     40     Mins  80620  Mod Eval     0     Mins  50136  High Eval                       0     Mins  68516  Hot/cold packs    0     mins  68246    Timed Treatment:   0   mins   Total Treatment:     40   mins    OT SIGNATURE: Jeovany Cortez OT   DATE TREATMENT INITIATED: 9/20/2021    Initial Certification  Certification Period: 12/19/2021  I certify that the therapy services are furnished while this patient is under my care.  The services outlined above are required by this patient, and will be reviewed every 90 days.     PHYSICIAN: Babs Muñoz PA-C      DATE:     Please sign and return via fax to   [phone].. Thank you, [facility].

## 2021-09-27 ENCOUNTER — TREATMENT (OUTPATIENT)
Dept: PHYSICAL THERAPY | Facility: CLINIC | Age: 26
End: 2021-09-27

## 2021-09-27 DIAGNOSIS — R20.0 NUMBNESS AND TINGLING IN BOTH HANDS: Primary | ICD-10-CM

## 2021-09-27 DIAGNOSIS — R20.2 NUMBNESS AND TINGLING IN BOTH HANDS: Primary | ICD-10-CM

## 2021-09-27 DIAGNOSIS — M79.641 BILATERAL HAND PAIN: ICD-10-CM

## 2021-09-27 DIAGNOSIS — M79.642 BILATERAL HAND PAIN: ICD-10-CM

## 2021-09-27 PROCEDURE — 97110 THERAPEUTIC EXERCISES: CPT | Performed by: OCCUPATIONAL THERAPIST

## 2021-09-28 ENCOUNTER — PROCEDURE VISIT (OUTPATIENT)
Dept: NEUROLOGY | Facility: CLINIC | Age: 26
End: 2021-09-28

## 2021-09-28 VITALS
DIASTOLIC BLOOD PRESSURE: 75 MMHG | HEIGHT: 66 IN | WEIGHT: 253 LBS | HEART RATE: 78 BPM | BODY MASS INDEX: 40.66 KG/M2 | SYSTOLIC BLOOD PRESSURE: 124 MMHG

## 2021-09-28 DIAGNOSIS — G56.01 CARPAL TUNNEL SYNDROME OF RIGHT WRIST: Primary | ICD-10-CM

## 2021-09-28 DIAGNOSIS — R20.0 BILATERAL HAND NUMBNESS: ICD-10-CM

## 2021-09-28 PROCEDURE — 99202 OFFICE O/P NEW SF 15 MIN: CPT | Performed by: PSYCHIATRY & NEUROLOGY

## 2021-09-28 PROCEDURE — 95909 NRV CNDJ TST 5-6 STUDIES: CPT | Performed by: PSYCHIATRY & NEUROLOGY

## 2021-09-28 NOTE — PROGRESS NOTES
"Chief Complaint  Numbness (BUE R>L), Pain (BUE R>L), and Extremity Weakness (RUE)    Subjective          Abbe Amos is a 26 y.o. female who presents to [facility]  History of Present Illness  26-year-old woman evaluated for right hand numbness and tingling.  There is right hand numbness and tingling has been ongoing for the last 6 months.  States that it only bothers her at night.  It wakes her up at night with her hands getting numb and tingly.  It usually pinky, ring finger and middle finger that gets numb and tingly.  It does not happen during the daytime.    She is also complaining of numbness in her back, legs occasionally.  It is her right hand that bothers her the most  Objective   Vital Signs:   /75   Pulse 78   Ht 167.6 cm (66\")   Wt 115 kg (253 lb)   BMI 40.84 kg/m²     Physical Exam   Fluent, phasic, follows commands well.  There is no weakness of the upper extremities individual muscle testing.  There is no weakness of intrinsic hand muscles.  Phalen sign is positive.  Pressure the wrist produces tingling in the index finger.        Assessment and Plan  Diagnoses and all orders for this visit:    1. Carpal tunnel syndrome of right wrist (Primary)  Assessment & Plan:  Nerve conduction study is abnormal and shows electrophysiologic evidence for mild right carpal tunnel syndrome.  I have given her a wrist splint to wear nightly and she can use it on a nightly basis for the next 3 to 4 weeks.  She can follow-up with her primary care provider.  She will be no significant improvement of her symptoms she can be referred to orthopedic surgery for steroid injections.      2. Bilateral hand numbness  -     EMG & Nerve Conduction Test       Nerve Conduction Study:  6 nerves     EMG:  Not done    Total time spent with the patient and coordinating patient care was 15 minutes.    Follow Up  No follow-ups on file.  Patient was given instructions and counseling " regarding her condition or for health maintenance advice. Please see specific information pulled into the AVS if appropriate.

## 2021-09-29 ENCOUNTER — TREATMENT (OUTPATIENT)
Dept: PHYSICAL THERAPY | Facility: CLINIC | Age: 26
End: 2021-09-29

## 2021-09-29 DIAGNOSIS — G56.01 CARPAL TUNNEL SYNDROME OF RIGHT WRIST: Primary | ICD-10-CM

## 2021-09-29 DIAGNOSIS — M79.642 BILATERAL HAND PAIN: ICD-10-CM

## 2021-09-29 DIAGNOSIS — M79.641 BILATERAL HAND PAIN: ICD-10-CM

## 2021-09-29 DIAGNOSIS — R20.2 NUMBNESS AND TINGLING IN BOTH HANDS: Primary | ICD-10-CM

## 2021-09-29 DIAGNOSIS — R20.0 NUMBNESS AND TINGLING IN BOTH HANDS: Primary | ICD-10-CM

## 2021-09-29 PROCEDURE — 97110 THERAPEUTIC EXERCISES: CPT | Performed by: OCCUPATIONAL THERAPIST

## 2021-09-29 NOTE — PROGRESS NOTES
" Occupational Therapy Daily Progress Note        Patient: Abbe Amos   : 1995  Diagnosis/ICD-10 Code:  Numbness and tingling in both hands [R20.0, R20.2]  Referring practitioner: Babs Muñoz PA-C  Date of Initial Visit: Type: THERAPY  Noted: 2021  Today's Date: 2021  Patient seen for 3 sessions             Subjective Evaluation    Pain  Current pain ratin         Abbe Amos reports: \"It's doing good. Not really having any numbness today. They did the nerve study and said that I have slight carpal tunnel in the right side but nothing on the left.\"    Objective     See Exercise, Manual, and Modality Logs for complete treatment.       Assessment/Plan    Progress per Plan of Care           Timed:  Manual Therapy:    0     mins  19379;  Therapeutic Exercise:    25     mins  53149;     Neuromuscular Jesús:    0    mins  11343;    Therapeutic Activity:     0     mins  21134;     Ultrasound:     0     mins  08573;    Electrical Stimulation:    0     mins  06081;    Untimed:  Electrical Stimulation:    0     mins  32209 ( );  Fluidotherapy     0     mins  22221  Hot/cold pack     0     mins  91749    Timed Treatment:   25   mins   Total Treatment:     25   mins        Jeovany Cortez OT  Occupational Therapist  "

## 2021-10-04 ENCOUNTER — TREATMENT (OUTPATIENT)
Dept: PHYSICAL THERAPY | Facility: CLINIC | Age: 26
End: 2021-10-04

## 2021-10-04 DIAGNOSIS — R20.0 NUMBNESS AND TINGLING IN BOTH HANDS: Primary | ICD-10-CM

## 2021-10-04 DIAGNOSIS — M79.642 BILATERAL HAND PAIN: ICD-10-CM

## 2021-10-04 DIAGNOSIS — M79.641 BILATERAL HAND PAIN: ICD-10-CM

## 2021-10-04 DIAGNOSIS — R20.2 NUMBNESS AND TINGLING IN BOTH HANDS: Primary | ICD-10-CM

## 2021-10-04 PROCEDURE — 97110 THERAPEUTIC EXERCISES: CPT | Performed by: OCCUPATIONAL THERAPIST

## 2021-10-04 NOTE — PROGRESS NOTES
Occupational Therapy Daily Progress Note        Patient: Abbe Amos   : 1995  Diagnosis/ICD-10 Code:  Numbness and tingling in both hands [R20.0, R20.2]  Referring practitioner: Babs Muñoz PA-C  Date of Initial Visit: Type: THERAPY  Noted: 2021  Today's Date: 10/4/2021  Patient seen for 4 sessions             Subjective   Abbe Amos reports: Numbness is getting better    Objective     See Exercise, Manual, and Modality Logs for complete treatment.       Assessment/Plan    Progress per Plan of Care           Timed:  Manual Therapy:    0     mins  13904;  Therapeutic Exercise:    23     mins  57893;     Neuromuscular Jesús:    0    mins  29760;    Therapeutic Activity:     0     mins  09448;     Ultrasound:     0     mins  99161;    Electrical Stimulation:    0     mins  29991;    Untimed:  Electrical Stimulation:    0     mins  11293 ( );  Fluidotherapy     0     mins  23664  Hot/cold pack     0     mins  75171    Timed Treatment:   23   mins   Total Treatment:     23   mins        Jeovany Cortez OT  Occupational Therapist

## 2021-10-05 DIAGNOSIS — R10.2 PELVIC PAIN: Primary | ICD-10-CM

## 2021-10-05 RX ORDER — IBUPROFEN 600 MG/1
600 TABLET ORAL EVERY 6 HOURS PRN
Qty: 30 TABLET | Refills: 2 | Status: SHIPPED | OUTPATIENT
Start: 2021-10-05 | End: 2021-11-10

## 2021-10-05 NOTE — TELEPHONE ENCOUNTER
Rx Refill Note  Requested Prescriptions     Pending Prescriptions Disp Refills   • ibuprofen (ADVIL,MOTRIN) 600 MG tablet 30 tablet 0     Sig: Take 1 tablet by mouth Every 6 (Six) Hours.      Last office visit with prescribing clinician: PATIENT LAST SEEN ON 4-9-21 FOR IUD PLACEMENT      Next office visit with prescribing clinician:NO APPOINTMENT SCHEDULED            Sania Naylor MA  10/05/21, 09:54 EDT

## 2021-10-07 ENCOUNTER — TREATMENT (OUTPATIENT)
Dept: PHYSICAL THERAPY | Facility: CLINIC | Age: 26
End: 2021-10-07

## 2021-10-07 DIAGNOSIS — R20.0 NUMBNESS AND TINGLING IN BOTH HANDS: Primary | ICD-10-CM

## 2021-10-07 DIAGNOSIS — M79.642 BILATERAL HAND PAIN: ICD-10-CM

## 2021-10-07 DIAGNOSIS — M79.641 BILATERAL HAND PAIN: ICD-10-CM

## 2021-10-07 DIAGNOSIS — R20.2 NUMBNESS AND TINGLING IN BOTH HANDS: Primary | ICD-10-CM

## 2021-10-07 PROCEDURE — 97110 THERAPEUTIC EXERCISES: CPT | Performed by: OCCUPATIONAL THERAPIST

## 2021-10-07 NOTE — PROGRESS NOTES
" Occupational Therapy Daily Progress Note        Patient: Abbe Amos   : 1995  Diagnosis/ICD-10 Code:  Numbness and tingling in both hands [R20.0, R20.2]  Referring practitioner: Babs Muñoz PA-C  Date of Initial Visit: Type: THERAPY  Noted: 2021  Today's Date: 10/7/2021  Patient seen for 5 sessions             Subjective   Abbe Amos reports: \"No pain or numbness right now. It tingled for just a little bit yesterday but then it went away\"    Objective     See Exercise, Manual, and Modality Logs for complete treatment.       Assessment/Plan    Progress per Plan of Care  Pt reports not completing HEP. OT reinforced importance of following HEP for symptom improvement.         Timed:  Manual Therapy:    0     mins  52727;  Therapeutic Exercise:    23     mins  82423;     Neuromuscular Jesús:    0    mins  71234;    Therapeutic Activity:     0     mins  00608;     Ultrasound:     0     mins  41859;    Electrical Stimulation:    0     mins  22368;    Untimed:  Electrical Stimulation:    0     mins  88469 ( );  Fluidotherapy     0     mins  36433  Hot/cold pack     0     mins  49442    Timed Treatment:   23   mins   Total Treatment:     23   mins        Jeovany Cortez OT  Occupational Therapist  "

## 2021-10-11 ENCOUNTER — TREATMENT (OUTPATIENT)
Dept: PHYSICAL THERAPY | Facility: CLINIC | Age: 26
End: 2021-10-11

## 2021-10-11 DIAGNOSIS — R20.0 NUMBNESS AND TINGLING IN BOTH HANDS: Primary | ICD-10-CM

## 2021-10-11 DIAGNOSIS — R20.2 NUMBNESS AND TINGLING IN BOTH HANDS: Primary | ICD-10-CM

## 2021-10-11 DIAGNOSIS — M79.641 BILATERAL HAND PAIN: ICD-10-CM

## 2021-10-11 DIAGNOSIS — M79.642 BILATERAL HAND PAIN: ICD-10-CM

## 2021-10-11 PROCEDURE — 97110 THERAPEUTIC EXERCISES: CPT | Performed by: OCCUPATIONAL THERAPIST

## 2021-10-11 NOTE — PROGRESS NOTES
" Occupational Therapy Daily Progress Note        Patient: Abbe Amos   : 1995  Diagnosis/ICD-10 Code:  Numbness and tingling in both hands [R20.0, R20.2]  Referring practitioner: Babs Muñoz PA-C  Date of Initial Visit: Type: THERAPY  Noted: 2021  Today's Date: 10/11/2021  Patient seen for 6 sessions             Subjective Evaluation    Pain  Current pain ratin         Abbe Amos reports: \"I'm good today\"    Objective     See Exercise, Manual, and Modality Logs for complete treatment.       Assessment/Plan    Progress per Plan of Care           Timed:  Manual Therapy:    0     mins  27317;  Therapeutic Exercise:    23     mins  51624;     Neuromuscular Jeúss:    0    mins  56758;    Therapeutic Activity:     0     mins  79033;     Ultrasound:     0     mins  59540;    Electrical Stimulation:    0     mins  91960;    Untimed:  Electrical Stimulation:    0     mins  77697 (MC );  Fluidotherapy     0     mins  01401  Hot/cold pack     0     mins  90859    Timed Treatment:   23   mins   Total Treatment:     23   mins        Jeovany Cortez OT  Occupational Therapist  "

## 2021-10-12 ENCOUNTER — OFFICE VISIT (OUTPATIENT)
Dept: ORTHOPEDIC SURGERY | Facility: CLINIC | Age: 26
End: 2021-10-12

## 2021-10-12 VITALS — BODY MASS INDEX: 41.62 KG/M2 | HEART RATE: 103 BPM | HEIGHT: 66 IN | WEIGHT: 259 LBS | OXYGEN SATURATION: 99 %

## 2021-10-12 DIAGNOSIS — G56.01 CARPAL TUNNEL SYNDROME OF RIGHT WRIST: Primary | ICD-10-CM

## 2021-10-12 PROCEDURE — 99213 OFFICE O/P EST LOW 20 MIN: CPT | Performed by: ORTHOPAEDIC SURGERY

## 2021-10-12 NOTE — PROGRESS NOTES
"Chief Complaint  Follow-up of the Right Wrist     Subjective      Abbe Amos presents to [facility] for a follow-up of right wrist. Patient had a history of a right fifth metacarpal fracture on 8/18/20. She states 7-8 months ago she started having pain, numbness, weakness, and tingling in her wrist when she was pregnant. Her PCP thought she may be developing carpal tunnel. She has tried bracing. She has been going to therapy and doing exercises for wrist.     No Known Allergies     Social History     Socioeconomic History   • Marital status: Single   Tobacco Use   • Smoking status: Current Every Day Smoker     Packs/day: 0.50     Years: 15.00     Pack years: 7.50   • Smokeless tobacco: Never Used   • Tobacco comment: Smoked 11-20 years   Vaping Use   • Vaping Use: Never used   Substance and Sexual Activity   • Alcohol use: Never   • Drug use: Not Currently     Comment: Former, [employer] Yessi,3,2019        Review of Systems     Objective   Vital Signs:   Pulse 103   Ht 167.6 cm (66\")   Wt 117 kg (259 lb)   SpO2 99%   BMI 41.80 kg/m²       Physical Exam  Constitutional:       Appearance: Normal appearance. Patient is well-developed and normal weight.   HENT:      Head: Normocephalic.      Right Ear: Hearing and external ear normal.      Left Ear: Hearing and external ear normal.      Nose: Nose normal.   Eyes:      Conjunctiva/sclera: Conjunctivae normal.   Cardiovascular:      Rate and Rhythm: Normal rate.   Pulmonary:      Effort: Pulmonary effort is normal.      Breath sounds: No wheezing or rales.   Abdominal:      Palpations: Abdomen is soft.      Tenderness: There is no abdominal tenderness.   Musculoskeletal:      Cervical back: Normal range of motion.   Skin:     Findings: No rash.   Neurological:      Mental Status: Patient is alert and oriented to person, place, and time.   Psychiatric:         Mood and Affect: Mood and affect normal.         Judgment: Judgment normal. "       Ortho Exam      RIGHT WRIST: Positive Tinel's test. Neurovascular intact. Sensation grossly intact. No swelling, atrophy, and skin discoloration. Skin intact. Full ROM. Patient able to wiggle fingers and make a fist. Full wrist extension, full wrist flexion, full , full thumb opposition, full PIP flexors, full DIP flexors, full PIP extensors, full finger adduction, full finger abduction. Radial pulse 2+, ulnar pulse 2+.        Procedures      Imaging Results (Most Recent)     None           Result Review :       [facility]  9/28/21  EMG  IMPRESSION: Mild right carpal tunnel syndrome.           Assessment and Plan     DX: Right carpal tunnel syndrome     Injections, bracing and medication discussed. She will try to be more consistent with bracing. If she fails with this, we will discuss an injection.     Call or return if worsening symptoms.    Follow Up     PRN.       Patient was given instructions and counseling regarding her condition or for health maintenance advice. Please see specific information pulled into the AVS if appropriate.     Scribed for Lexa Walker MD by Jessica Alva.  10/12/21   15:40 EDT        I have personally performed the services described in this document as scribed by the above individual and it is both accurate and complete. Lexa Walker MD 10/13/21

## 2021-10-13 ENCOUNTER — OFFICE VISIT (OUTPATIENT)
Dept: INTERNAL MEDICINE | Facility: CLINIC | Age: 26
End: 2021-10-13

## 2021-10-13 VITALS
HEART RATE: 92 BPM | RESPIRATION RATE: 15 BRPM | HEIGHT: 66 IN | DIASTOLIC BLOOD PRESSURE: 92 MMHG | BODY MASS INDEX: 41.3 KG/M2 | TEMPERATURE: 98.5 F | WEIGHT: 257 LBS | OXYGEN SATURATION: 98 % | SYSTOLIC BLOOD PRESSURE: 148 MMHG

## 2021-10-13 DIAGNOSIS — L73.2 HIDRADENITIS SUPPURATIVA: Primary | ICD-10-CM

## 2021-10-13 DIAGNOSIS — F17.210 CIGARETTE NICOTINE DEPENDENCE WITHOUT COMPLICATION: ICD-10-CM

## 2021-10-13 PROCEDURE — 87070 CULTURE OTHR SPECIMN AEROBIC: CPT | Performed by: PHYSICIAN ASSISTANT

## 2021-10-13 PROCEDURE — 87205 SMEAR GRAM STAIN: CPT | Performed by: PHYSICIAN ASSISTANT

## 2021-10-13 PROCEDURE — 99213 OFFICE O/P EST LOW 20 MIN: CPT | Performed by: PHYSICIAN ASSISTANT

## 2021-10-13 RX ORDER — SULFAMETHOXAZOLE AND TRIMETHOPRIM 800; 160 MG/1; MG/1
1 TABLET ORAL 2 TIMES DAILY
Qty: 14 TABLET | Refills: 0 | Status: SHIPPED | OUTPATIENT
Start: 2021-10-13 | End: 2021-10-20

## 2021-10-13 RX ORDER — ACYCLOVIR 400 MG/1
400 TABLET ORAL 3 TIMES DAILY
COMMUNITY
End: 2021-11-10

## 2021-10-13 NOTE — ASSESSMENT & PLAN NOTE
Discussed infection. Culture sent today. Will start Bactrim and adjust if needed based on culture result. Encouraged pt to contact surgeon again to discuss surgical intervention.

## 2021-10-13 NOTE — PATIENT INSTRUCTIONS
Hidradenitis Suppurativa  Hidradenitis suppurativa is a long-term (chronic) skin disease. It is similar to a severe form of acne, but it affects areas of the body where acne would be unusual, especially areas of the body where skin rubs against skin and becomes moist. These include:  · Underarms.  · Groin.  · Genital area.  · Buttocks.  · Upper thighs.  · Breasts.  Hidradenitis suppurativa may start out as small lumps or pimples caused by blocked sweat glands or hair follicles. Pimples may develop into deep sores that break open (rupture) and drain pus. Over time, affected areas of skin may thicken and become scarred. This condition is rare and does not spread from person to person (non-contagious).  What are the causes?  The exact cause of this condition is not known. It may be related to:  · Male and female hormones.  · An overactive disease-fighting system (immune system). The immune system may over-react to blocked hair follicles or sweat glands and cause swelling and pus-filled sores.  What increases the risk?  You are more likely to develop this condition if you:  · Are female.  · Are 11-55 years old.  · Have a family history of hidradenitis suppurativa.  · Have a personal history of acne.  · Are overweight.  · Smoke.  · Take the medicine lithium.  What are the signs or symptoms?  The first symptoms are usually painful bumps in the skin, similar to pimples. The condition may get worse over time (progress), or it may only cause mild symptoms. If the disease progresses, symptoms may include:  · Skin bumps getting bigger and growing deeper into the skin.  · Bumps rupturing and draining pus.  · Itchy, infected skin.  · Skin getting thicker and scarred.  · Tunnels under the skin (fistulas) where pus drains from a bump.  · Pain during daily activities, such as pain during walking if your groin area is affected.  · Emotional problems, such as stress or depression. This condition may affect your appearance and your  ability or willingness to wear certain clothes or do certain activities.  How is this diagnosed?  This condition is diagnosed by a health care provider who specializes in skin diseases (dermatologist). You may be diagnosed based on:  · Your symptoms and medical history.  · A physical exam.  · Testing a pus sample for infection.  · Blood tests.  How is this treated?  Your treatment will depend on how severe your symptoms are. The same treatment will not work for everybody with this condition. You may need to try several treatments to find what works best for you. Treatment may include:  · Cleaning and bandaging (dressing) your wounds as needed.  · Lifestyle changes, such as new skin care routines.  · Taking medicines, such as:  ? Antibiotics.  ? Acne medicines.  ? Medicines to reduce the activity of the immune system.  ? A diabetes medicine (metformin).  ? Birth control pills, for women.  ? Steroids to reduce swelling and pain.  · Working with a mental health care provider, if you experience emotional distress due to this condition.  If you have severe symptoms that do not get better with medicine, you may need surgery. Surgery may involve:  · Using a laser to clear the skin and remove hair follicles.  · Opening and draining deep sores.  · Removing the areas of skin that are diseased and scarred.  Follow these instructions at home:  Medicines    · Take over-the-counter and prescription medicines only as told by your health care provider.  · If you were prescribed an antibiotic medicine, take it as told by your health care provider. Do not stop taking the antibiotic even if your condition improves.    Skin care  · If you have open wounds, cover them with a clean dressing as told by your health care provider. Keep wounds clean by washing them gently with soap and water when you bathe.  · Do not shave the areas where you get hidradenitis suppurativa.  · Do not wear deodorant.  · Wear loose-fitting clothes.  · Try to  avoid getting overheated or sweaty. If you get sweaty or wet, change into clean, dry clothes as soon as you can.  · To help relieve pain and itchiness, cover sore areas with a warm, clean washcloth (warm compress) for 5-10 minutes as often as needed.  · If told by your health care provider, take a bleach bath twice a week:  ? Fill your bathtub FDC with water.  ? Pour in ½ cup of unscented household bleach.  ? Soak in the tub for 5-10 minutes.  ? Only soak from the neck down. Avoid water on your face and hair.  ? Shower to rinse off the bleach from your skin.  General instructions  · Learn as much as you can about your disease so that you have an active role in your treatment. Work closely with your health care provider to find treatments that work for you.  · If you are overweight, work with your health care provider to lose weight as recommended.  · Do not use any products that contain nicotine or tobacco, such as cigarettes and e-cigarettes. If you need help quitting, ask your health care provider.  · If you struggle with living with this condition, talk with your health care provider or work with a mental health care provider as recommended.  · Keep all follow-up visits as told by your health care provider. This is important.  Where to find more information  · Hidradenitis Suppurativa [employer].: https://www.hs-foundation.org/  Contact a health care provider if you have:  · A flare-up of hidradenitis suppurativa.  · A fever or chills.  · Trouble controlling your symptoms at home.  · Trouble doing your daily activities because of your symptoms.  · Trouble dealing with emotional problems related to your condition.  Summary  · Hidradenitis suppurativa is a long-term (chronic) skin disease. It is similar to a severe form of acne, but it affects areas of the body where acne would be unusual.  · The first symptoms are usually painful bumps in the skin, similar to pimples. The condition may get worse over time  (progress), or it may only cause mild symptoms.  · If you have open wounds, cover them with a clean dressing as told by your health care provider. Keep wounds clean by washing them gently with soap and water when you bathe.  · Besides skin care, treatment may include medicines, laser treatment, and surgery.  This information is not intended to replace advice given to you by your health care provider. Make sure you discuss any questions you have with your health care provider.  Document Revised: 12/26/2018 Document Reviewed: 12/26/2018  [employer] Patient Education © 2021 [employer]

## 2021-10-13 NOTE — PROGRESS NOTES
Chief Complaint  Recurrent Skin Infections    Subjective          Abbe Amos presents to [facility]  Patient here today with recurrent hidradenitis.  Patient has had symptoms that come and go over the past few years.  Symptoms have flared worse in the past 2 months but have significantly started draining in the past 2 weeks.  Lesion has felt warm under left armpit.  She has had drainage that has an odor to it.  It is green.  She states is very tender to the touch.  She has tried keeping area clean without success.  She has seen surgeon in the past but told that surgery was not an option at that point.  Patient is still smoking but does not want to quit at this time.      Past Medical History:   Diagnosis Date   • Anxiety    • Chemical dependency (HCC) 06/2019   • Depression    • Hepatitis    • History of methamphetamine abuse (HCC) 06/11/2020    Cont avoidance of drug use, especially now since pt is pregnant   • History of self-harm 06/11/2020   • Hydradenitis    • Hyperlipemia    • Nicotine dependence    • Night sweats    • Pregnancy    • Shortness of breath    • STD (sexually transmitted disease)    • Suicidal ideations 06/11/2020    Discussed suicidal thoughts. PT signed safety contract today. If thoughts return pt needs to call 911 or go immediately to ER for treatment. We will refer asap to Psychiatry for counseling. Discussed safety plan with pt. Discussed need to lock up all weapons and Meds in the home. Encouraged Pt to find things pt enjoys. Discussed different med options and side effects. RTC for re-evaluation.         History reviewed. No pertinent surgical history.     Current Outpatient Medications on File Prior to Visit   Medication Sig Dispense Refill   • acyclovir (ZOVIRAX) 400 MG tablet Take 400 mg by mouth 3 (Three) Times a Day.     • acyclovir (ZOVIRAX) 400 MG tablet Take 400 mg by mouth 3 (Three) Times a Day. Take no more than 5 doses a day.    "  • buPROPion XL (WELLBUTRIN XL) 300 MG 24 hr tablet Take 300 mg by mouth Daily.     • busPIRone (BUSPAR) 10 MG tablet Take 20 mg by mouth 3 (Three) Times a Day.     • glycopyrrolate (ROBINUL) 1 MG tablet Take 1 mg by mouth every night at bedtime.     • ibuprofen (ADVIL,MOTRIN) 600 MG tablet Take 600 mg by mouth Every 6 (Six) Hours.     • ibuprofen (ADVIL,MOTRIN) 600 MG tablet Take 1 tablet by mouth Every 6 (Six) Hours As Needed for Mild Pain . 30 tablet 2   • ID Now COVID-19 kit See Admin Instructions. for testing     • prazosin (MINIPRESS) 1 MG capsule Take 3 mg by mouth every night at bedtime.     • sertraline (ZOLOFT) 100 MG tablet TAKE 2 TABLETS BY MOUTH EVERY DAY FOR ANXIETY AND DEPRESSION     • [DISCONTINUED] ARIPiprazole (ABILIFY) 2 MG tablet Take 2 mg by mouth Daily.     • [DISCONTINUED] nicotine (Nicoderm CQ) 7 MG/24HR patch Place 1 patch on the skin as directed by provider Daily. 30 patch 3   • [DISCONTINUED] nicotine polacrilex (NICORETTE) 4 MG gum Chew 1 each As Needed for Smoking Cessation. 30 each 2   • [DISCONTINUED] ondansetron ODT (ZOFRAN-ODT) 4 MG disintegrating tablet DISSOLVE 1 TABLET ON THE TONGUE THREE TIMES DAILY AS NEEDED       No current facility-administered medications on file prior to visit.        No Known Allergies    Social History     Tobacco Use   Smoking Status Current Every Day Smoker   • Packs/day: 0.50   • Years: 15.00   • Pack years: 7.50   Smokeless Tobacco Never Used   Tobacco Comment    Smoked 11-20 years          Objective   Vital Signs:   /92   Pulse 92   Temp 98.5 °F (36.9 °C)   Resp 15   Ht 167.6 cm (66\")   Wt 117 kg (257 lb)   SpO2 98%   BMI 41.48 kg/m²     Physical Exam  Vitals reviewed.   Constitutional:       Appearance: Normal appearance.   HENT:      Head: Normocephalic and atraumatic.      Nose: Nose normal.      Mouth/Throat:      Mouth: Mucous membranes are moist.   Eyes:      Extraocular Movements: Extraocular movements intact.      " Conjunctiva/sclera: Conjunctivae normal.      Pupils: Pupils are equal, round, and reactive to light.   Cardiovascular:      Rate and Rhythm: Normal rate and regular rhythm.   Pulmonary:      Effort: Pulmonary effort is normal.      Breath sounds: Normal breath sounds.   Abdominal:      General: Abdomen is flat. Bowel sounds are normal.      Palpations: Abdomen is soft.   Musculoskeletal:         General: Normal range of motion.   Skin:     Findings: Abscess (drainging yellow malodorous discharge, erythematous, ttp under L axilla), erythema and wound present.          Neurological:      General: No focal deficit present.      Mental Status: She is alert and oriented to person, place, and time.   Psychiatric:         Mood and Affect: Mood normal.        Result Review :                 Assessment and Plan    Diagnoses and all orders for this visit:    1. Hidradenitis suppurativa (Primary)  Assessment & Plan:  Discussed infection. Culture sent today. Will start Bactrim and adjust if needed based on culture result. Encouraged pt to contact surgeon again to discuss surgical intervention.    Orders:  -     Wound Culture - Wound, Axilla, Left; Future  -     Wound Culture - Wound, Axilla, Left    2. Cigarette nicotine dependence without complication  Assessment & Plan:  Tobacco use is unchanged.  Smoking cessation counseling was provided. Pt not ready to quit  Tobacco use will be reassessed at the next regular appointment.    Orders:  -     Wound Culture - Wound, Axilla, Left; Future  -     Wound Culture - Wound, Axilla, Left    Other orders  -     sulfamethoxazole-trimethoprim (Bactrim DS) 800-160 MG per tablet; Take 1 tablet by mouth 2 (Two) Times a Day for 7 days.  Dispense: 14 tablet; Refill: 0      Follow Up   Return if symptoms worsen or fail to improve.  Patient was given instructions and counseling regarding her condition or for health maintenance advice. Please see specific information pulled into the AVS if  appropriate.

## 2021-10-14 ENCOUNTER — TELEPHONE (OUTPATIENT)
Dept: PHYSICAL THERAPY | Facility: CLINIC | Age: 26
End: 2021-10-14

## 2021-10-14 NOTE — ASSESSMENT & PLAN NOTE
Tobacco use is unchanged.  Smoking cessation counseling was provided. Pt not ready to quit  Tobacco use will be reassessed at the next regular appointment.

## 2021-10-16 LAB
BACTERIA SPEC AEROBE CULT: NORMAL
GRAM STN SPEC: NORMAL

## 2021-10-25 ENCOUNTER — TREATMENT (OUTPATIENT)
Dept: PHYSICAL THERAPY | Facility: CLINIC | Age: 26
End: 2021-10-25

## 2021-10-25 DIAGNOSIS — M79.641 BILATERAL HAND PAIN: ICD-10-CM

## 2021-10-25 DIAGNOSIS — R20.0 NUMBNESS AND TINGLING IN BOTH HANDS: Primary | ICD-10-CM

## 2021-10-25 DIAGNOSIS — M79.642 BILATERAL HAND PAIN: ICD-10-CM

## 2021-10-25 DIAGNOSIS — R20.2 NUMBNESS AND TINGLING IN BOTH HANDS: Primary | ICD-10-CM

## 2021-10-25 PROCEDURE — 97110 THERAPEUTIC EXERCISES: CPT | Performed by: OCCUPATIONAL THERAPIST

## 2021-10-25 RX ORDER — IBUPROFEN 600 MG/1
600 TABLET ORAL EVERY 6 HOURS
Qty: 30 TABLET | Refills: 0 | OUTPATIENT
Start: 2021-10-25

## 2021-10-25 NOTE — PROGRESS NOTES
Patient: Abbe Amos   : 1995  Diagnosis/ICD-10 Code:  Numbness and tingling in both hands [R20.0, R20.2]  Referring practitioner: Babs Muñoz PA-C  Date of Initial Visit: Type: THERAPY  Noted: 2021  Today's Date: 10/25/2021  Patient seen for 7 sessions      Subjective:   Subjective Questionnaire: QuickDASH:   Clinical Progress: improved  Home Program Compliance: Yes  Treatment has included: therapeutic exercise and moist heat    Subjective   Objective          Active Range of Motion   Left Shoulder   Normal active range of motion    Right Shoulder   Normal active range of motion    Left Elbow   Normal active range of motion    Right Elbow   Normal active range of motion    Left Wrist   Normal active range of motion    Right Wrist   Normal active range of motion    Additional Active Range of Motion Details  WNL all digits B hands.    Strength/Myotome Testing     Left Shoulder   Normal muscle strength    Right Shoulder   Normal muscle strength    Left Elbow   Normal strength    Right Elbow   Normal strength    Left Wrist/Hand   Normal wrist strength     (2nd hand position)     Trial 1: 73 lbs    Trial 2: 83 lbs    Trial 3: 85 lbs    Average: 80.33 lbs    Right Wrist/Hand   Normal wrist strength     (2nd hand position)     Trial 1: 95 lbs    Trial 2: 90 lbs    Trial 3: 85 lbs    Average: 90 lbs    Additional Strength Details  Pt reports she has been using her left hand more with ADLs secondary to a previous 5th metacarpal shaft fracture on the right hand.    Tests     Left Elbow   Negative Tinel's sign (cubital tunnel).     Right Elbow   Negative Tinel's sign (cubital tunnel).     Left Wrist/Hand   Negative Phalen's sign and Tinel's sign (medial nerve).     Right Wrist/Hand   Negative Phalen's sign and Tinel's sign (medial nerve).     Additional Tests Details  Negative ulnar tension test on right; positive ulnar tension test on left.      Assessment & Plan     Assessment  Impairments:  activity intolerance, impaired physical strength, lacks appropriate home exercise program and pain with function  Assessment details: Pt reports decreased pain and improved sensation in B hands.  Prognosis: fair  Functional Limitations: carrying objects, lifting, sleeping, pulling, pushing, uncomfortable because of pain and unable to perform repetitive tasks  Goals  Plan Goals: The patient complains of pain in B hands.  LTG 1  6 weeks:  The patient will report a pain rating of 0/10 in order to improve sleep quality and tolerance to performance of activities of daily living.   Status: Met    2.  The patient reports numbness and tingling in B hands.  LTG 2  6 weeks:  The patient will report 50% less numbness and tingling in B hands in order to grasp and manipulate objects and improve sleep.  Status: Met  STG 2  4 weeks:  The patient will report 25% less numbness and tingling.  Status:  Met    3.  Carrying, moving, and handling objects functional limitation.  LTG 3  6 weeks:  The patient will demonstrate 1-19 % limitation by achieving a score of 19 on the Quick DASH.  Status: Met    Plan  Planned modality interventions: electrical stimulation/Russian stimulation and thermotherapy (hydrocollator packs)  Other planned modality interventions: fluidotherapy  Planned therapy interventions: body mechanics training, functional ROM exercises, home exercise program, manual therapy, neuromuscular re-education, postural training, soft tissue mobilization, strengthening and stretching  Treatment plan discussed with: patient      Progress toward previous goals: All Met      Recommendations: Continue as planned  Timeframe: 1 month  Prognosis to achieve goals: good    OT SIGNATURE: Jeovany Cortez OT   DATE TREATMENT INITIATED: 10/25/2021  [place] License: [id number]  Certification Period: 10/25/2021 - 1/22/2022    Timed:  Manual Therapy:    0     mins  08124;  Therapeutic Exercise:    23     mins  99640;     Neuromuscular Jesús:    0    mins   70128;    Therapeutic Activity:     0     mins  88273;     Ultrasound:     0     mins  56727;    Electrical Stimulation:    0     mins  01765 ( );    Untimed:  Electrical Stimulation:    0     mins  73744 ( );  Fluidotherapy     0     mins  43264  Hot/cold pack     0     mins  66970    Timed Treatment:   23   mins   Total Treatment:     23   mins

## 2021-10-26 ENCOUNTER — PREP FOR SURGERY (OUTPATIENT)
Dept: OTHER | Facility: HOSPITAL | Age: 26
End: 2021-10-26

## 2021-10-26 ENCOUNTER — OFFICE VISIT (OUTPATIENT)
Dept: SURGERY | Facility: CLINIC | Age: 26
End: 2021-10-26

## 2021-10-26 VITALS — HEIGHT: 66 IN | RESPIRATION RATE: 16 BRPM | BODY MASS INDEX: 41.21 KG/M2 | WEIGHT: 256.4 LBS

## 2021-10-26 DIAGNOSIS — L73.2 HIDRADENITIS SUPPURATIVA: Primary | ICD-10-CM

## 2021-10-26 PROCEDURE — 99214 OFFICE O/P EST MOD 30 MIN: CPT | Performed by: SURGERY

## 2021-10-26 RX ORDER — SODIUM CHLORIDE, SODIUM LACTATE, POTASSIUM CHLORIDE, CALCIUM CHLORIDE 600; 310; 30; 20 MG/100ML; MG/100ML; MG/100ML; MG/100ML
50 INJECTION, SOLUTION INTRAVENOUS CONTINUOUS
Status: CANCELLED | OUTPATIENT
Start: 2021-10-26

## 2021-10-26 RX ORDER — CEFAZOLIN SODIUM 2 G/100ML
2 INJECTION, SOLUTION INTRAVENOUS ONCE
Status: CANCELLED | OUTPATIENT
Start: 2021-10-26 | End: 2021-10-26

## 2021-10-26 RX ORDER — SODIUM CHLORIDE 0.9 % (FLUSH) 0.9 %
10 SYRINGE (ML) INJECTION EVERY 12 HOURS SCHEDULED
Status: CANCELLED | OUTPATIENT
Start: 2021-10-26

## 2021-10-26 RX ORDER — SODIUM CHLORIDE 0.9 % (FLUSH) 0.9 %
10 SYRINGE (ML) INJECTION AS NEEDED
Status: CANCELLED | OUTPATIENT
Start: 2021-10-26

## 2021-10-27 NOTE — PROGRESS NOTES
General Surgery/Colorectal Surgery Note    Patient Name:  Abbe Amos  YOB: 1995  [id number]    Referring Provider: Babs Muñoz PA-C      Patient Care Team:  Babs Muñoz PA-C as PCP - General (Physician Assistant)  Serg Sterling MD as Consulting Physician (General Surgery)    Chief complaint left axilla pain    Subjective .     History of present illness:    Previously seen.  History of hidradenitis currently on no therapy.  She smokes daily.  History of this for 5 to 6 years with previous multiple abscess to bilateral axilla with staph infection.  No history of MRSA.      She has had increasing pain to her left axilla and developing an open wound with drainage.  Several other areas around that with pain.  No fever.  Still smoking.  She has not seen dermatology.  No changes in health or medications since last seen.      History:  Past Medical History:   Diagnosis Date   • Anxiety    • Chemical dependency (HCC) 06/2019   • Depression    • Hepatitis    • History of methamphetamine abuse (HCC) 06/11/2020    Cont avoidance of drug use, especially now since pt is pregnant   • History of self-harm 06/11/2020   • Hydradenitis    • Hyperlipemia    • Nicotine dependence    • Night sweats    • Pregnancy    • Shortness of breath    • STD (sexually transmitted disease)    • Suicidal ideations 06/11/2020    Discussed suicidal thoughts. PT signed safety contract today. If thoughts return pt needs to call 911 or go immediately to ER for treatment. We will refer asap to Psychiatry for counseling. Discussed safety plan with pt. Discussed need to lock up all weapons and Meds in the home. Encouraged Pt to find things pt enjoys. Discussed different med options and side effects. RTC for re-evaluation.        Past Surgical History:   Procedure Laterality Date   • PILONIDAL CYST DRAINAGE      at age 11       Family History   Problem Relation Age of Onset   • Thyroid cancer Mother         Gland,  Malignant   • Cancer Mother        Social History     Tobacco Use   • Smoking status: Current Every Day Smoker     Packs/day: 0.50     Years: 15.00     Pack years: 7.50   • Smokeless tobacco: Never Used   • Tobacco comment: Smoked 11-20 years   Vaping Use   • Vaping Use: Never used   Substance Use Topics   • Alcohol use: Never   • Drug use: Not Currently     Comment: Former, Meth Yessi,3,2019       Review of Systems  All systems were reviewed and negative except for:   Review of Systems   Constitutional: Negative for chills, fever and unexpected weight loss.   HENT: Negative for congestion, nosebleeds and voice change.    Eyes: Negative for blurred vision, double vision and discharge.   Respiratory: Negative for apnea, chest tightness and shortness of breath.    Cardiovascular: Negative for chest pain and leg swelling.   Gastrointestinal: No nausea vomiting diarrhea   Endocrine: Negative for cold intolerance and heat intolerance.   Genitourinary: Negative for dysuria, hematuria and urgency.   Musculoskeletal: Negative for back pain, joint swelling and neck pain.   Skin: Negative for color change and dry skin.  See HPI  Neurological: Negative for dizziness and confusion.   Hematological: Negative for adenopathy.   Psychiatric/Behavioral: Negative for agitation and behavioral problems.     MEDS:  Prior to Admission medications    Medication Sig Start Date End Date Taking? Authorizing Provider   acyclovir (ZOVIRAX) 400 MG tablet Take 400 mg by mouth 3 (Three) Times a Day. Take no more than 5 doses a day.   Yes Pritesh Rajput MD   buPROPion XL (WELLBUTRIN XL) 300 MG 24 hr tablet Take 300 mg by mouth Daily. 5/28/21  Yes Pritesh Rajput MD   busPIRone (BUSPAR) 10 MG tablet Take 20 mg by mouth 3 (Three) Times a Day. 4/13/21  Yes Pritesh Rajput MD   glycopyrrolate (ROBINUL) 1 MG tablet Take 1 mg by mouth every night at bedtime. 5/22/21  Yes Pritesh Rajput MD   ibuprofen (ADVIL,MOTRIN) 600 MG tablet  Take 1 tablet by mouth Every 6 (Six) Hours As Needed for Mild Pain . 10/5/21  Yes Jennifer Cheatham DO   prazosin (MINIPRESS) 1 MG capsule Take 3 mg by mouth every night at bedtime. 5/10/21  Yes Pritesh Rajput MD   sertraline (ZOLOFT) 100 MG tablet TAKE 2 TABLETS BY MOUTH EVERY DAY FOR ANXIETY AND DEPRESSION 4/28/21  Yes Pritesh Rajput MD   acyclovir (ZOVIRAX) 400 MG tablet Take 400 mg by mouth 3 (Three) Times a Day. 3/26/21   Pritesh Rajput MD   ibuprofen (ADVIL,MOTRIN) 600 MG tablet Take 600 mg by mouth Every 6 (Six) Hours. 5/28/21   Pritesh Rajput MD   ID Now COVID-19 kit See Admin Instructions. for testing 5/15/21   Pritesh Rajput MD        Allergies:  Patient has no known allergies.    Objective     Vital Signs        Physical Exam:     General Appearance:    Alert, cooperative, in no acute distress   Head:    Normocephalic, without obvious abnormality, atraumatic   Eyes:          Conjunctivae and sclerae normal, no icterus,     Ears:    Ears appear intact with no abnormalities noted   Throat:   No oral lesions, no thrush, oral mucosa moist   Neck:   No adenopathy, supple, trachea midline, no thyromegaly   Back:     No kyphosis present, no scoliosis present, no skin lesions,      erythema or scars, no tenderness to percussion or                   palpation,   range of motion normal   Lungs:     Clear to auscultation,respirations regular, even and                  unlabored    Heart:    Regular rhythm and normal rate, normal S1 and S2, no            murmur, no gallop, no rub, no click   Chest Wall:    No abnormalities observed   Abdomen:     Normal bowel sounds, no masses, no organomegaly, soft        non-tender, non-distended, no guarding, no rebound                tenderness   Rectal:        Extremities:   Moves all extremities well, no edema, no cyanosis, no             redness   Pulses:   Pulses palpable and equal bilaterally   Skin:   No bleeding, bruising or rash, open  "sore 1 x 2 cm without evidence of active infection to the left axilla   Lymph nodes:   No palpable adenopathy   Neurologic:   A/o x 4 with no deficits       Results Review:   {Results Review:40998::\"I reviewed the patient's new clinical results.\"    LABS/IMAGING:  Results for orders placed or performed in visit on 10/13/21   Wound Culture - Wound, Axilla, Left    Specimen: Axilla, Left; Wound   Result Value Ref Range    Wound Culture Moderate growth (3+) Normal Skin [place]     Gram Stain No organisms seen         Result Review :     Assessment/Plan     Symptomatic left axilla hidradenitis  Tobacco abuse    I had a discussion with the patient.  With her symptoms I recommended excision of the left axilla disease in the operating room.  I explained the procedure and recovery.  Benefits and alternatives discussed.  Risk of procedure including risk of anesthesia, bleeding, infection, wound problems, recurrence, scarring, pain were discussed.  All questions answered.  She agrees with the plan.  Orders placed.  Smoking cessation counseling performed.  I also placed another order for dermatology referral to discuss medical therapy.  All questions answered.  She agrees with the plan.  She was instructed to use Hibiclens night before surgery.  Thank you for the consult.                This document has been electronically signed by Serg Sterling MD  October 27, 2021 14:31 EDT    "

## 2021-11-01 ENCOUNTER — TREATMENT (OUTPATIENT)
Dept: PHYSICAL THERAPY | Facility: CLINIC | Age: 26
End: 2021-11-01

## 2021-11-01 DIAGNOSIS — R20.0 NUMBNESS AND TINGLING IN BOTH HANDS: Primary | ICD-10-CM

## 2021-11-01 DIAGNOSIS — M79.641 BILATERAL HAND PAIN: ICD-10-CM

## 2021-11-01 DIAGNOSIS — M79.642 BILATERAL HAND PAIN: ICD-10-CM

## 2021-11-01 DIAGNOSIS — R20.2 NUMBNESS AND TINGLING IN BOTH HANDS: Primary | ICD-10-CM

## 2021-11-01 PROCEDURE — 97110 THERAPEUTIC EXERCISES: CPT | Performed by: OCCUPATIONAL THERAPIST

## 2021-11-01 NOTE — PROGRESS NOTES
Occupational Therapy Daily Progress Note        Patient: Abbe Amos   : 1995  Diagnosis/ICD-10 Code:  Numbness and tingling in both hands [R20.0, R20.2]  Referring practitioner: Babs Muñoz PA-C  Date of Initial Visit: Type: THERAPY  Noted: 2021  Today's Date: 2021  Patient seen for 8 sessions             Subjective   Abbe Amos reports: No numbness or pain.    Objective     See Exercise, Manual, and Modality Logs for complete treatment.       Assessment/Plan    Progress per Plan of Care           Timed:  Manual Therapy:    0     mins  48407;  Therapeutic Exercise:    23     mins  92227;     Neuromuscular Jesús:    0    mins  88431;    Therapeutic Activity:     0     mins  33361;     Ultrasound:     0     mins  92059;    Electrical Stimulation:    0     mins  88159;    Untimed:  Electrical Stimulation:    0     mins  11322 ( );  Fluidotherapy     0     mins  43328  Hot/cold pack     0     mins  63455    Timed Treatment:   23   mins   Total Treatment:     23   mins        Jeovany Cortez OT  Occupational Therapist  [place] License: [id number]

## 2021-11-10 NOTE — PRE-PROCEDURE INSTRUCTIONS
PATIENT INSTRUCTED TO BE:    - NPO AFTER MIDNIGHT EXCEPT CAN HAVE CLEAR LIQUIDS 2 HOURS PRIOR TO SURGERY ARRIVAL TIME     - TO HOLD ALL VITAMINS, SUPPLEMENTS, NSAIDS FOR ONE WEEK PRIOR TO THEIR SURGICAL PROCEDURE    - INSTRUCTED PT TO USE SURGICAL SOAP 1 TIME THE NIGHT PRIOR TO SURGERY OR THE AM OF SURGERY.   USE SOAP FROM NECK TO TOES AVOID THEIR FACE, HAIR, AND PRIVATE PARTS. INSTRUCTED NO LOTIONS, JEWELRY, PIERCINGS, OR DEODORANT DAY OF SURGERY    - IF DIABETIC, CHECK BLOOD GLUCOSE IF LESS THAN 70 CALL PREOP AREA -AM OF SURGERY     - INSTRUCTED TO TAKE THE FOLLOWING MEDICATIONS THE DAY OF SURGERY:        ZOVIRAX, WELLBUTRIN, BUSPAR, ZOLOFT     PATIENT VERBALIZED UNDERSTANDING

## 2021-11-19 ENCOUNTER — DOCUMENTATION (OUTPATIENT)
Dept: PHYSICAL THERAPY | Facility: CLINIC | Age: 26
End: 2021-11-19

## 2021-11-19 DIAGNOSIS — M79.642 BILATERAL HAND PAIN: ICD-10-CM

## 2021-11-19 DIAGNOSIS — M79.641 BILATERAL HAND PAIN: ICD-10-CM

## 2021-11-19 DIAGNOSIS — R20.2 NUMBNESS AND TINGLING IN BOTH HANDS: Primary | ICD-10-CM

## 2021-11-19 DIAGNOSIS — R20.0 NUMBNESS AND TINGLING IN BOTH HANDS: Primary | ICD-10-CM

## 2021-11-19 NOTE — PROGRESS NOTES
Discharge Summary  Discharge Summary from Occupational Therapy Report      Dates  OT visit: 9/20/21 - 11/01/21  Number of Visits: 8     Discharge Status of Patient: See Progress Note dated 10/25/21    Goals: All Met    Discharge Plan: Continue with current home exercise program as instructed    Comments Pt reports wanting to be discharged at this time because she does not need therapy anymore.    Date of Discharge 11/01/21        Jeovany Cortez OT  Occupational Therapist  [place] License:[id number]  NPI:[id number]

## 2021-11-22 ENCOUNTER — LAB (OUTPATIENT)
Dept: LAB | Facility: HOSPITAL | Age: 26
End: 2021-11-22

## 2021-11-22 DIAGNOSIS — L73.2 HIDRADENITIS SUPPURATIVA: ICD-10-CM

## 2021-11-22 PROCEDURE — U0003 INFECTIOUS AGENT DETECTION BY NUCLEIC ACID (DNA OR RNA); SEVERE ACUTE RESPIRATORY SYNDROME CORONAVIRUS 2 (SARS-COV-2) (CORONAVIRUS DISEASE [COVID-19]), AMPLIFIED PROBE TECHNIQUE, MAKING USE OF HIGH THROUGHPUT TECHNOLOGIES AS DESCRIBED BY CMS-2020-01-R: HCPCS

## 2021-11-22 PROCEDURE — C9803 HOPD COVID-19 SPEC COLLECT: HCPCS

## 2021-11-23 LAB — SARS-COV-2 RNA RESP QL NAA+PROBE: NOT DETECTED

## 2021-11-26 ENCOUNTER — HOSPITAL ENCOUNTER (OUTPATIENT)
Facility: HOSPITAL | Age: 26
Setting detail: HOSPITAL OUTPATIENT SURGERY
Discharge: HOME | End: 2021-11-26
Attending: SURGERY | Admitting: SURGERY

## 2021-11-26 ENCOUNTER — ANESTHESIA EVENT (OUTPATIENT)
Dept: PERIOP | Facility: HOSPITAL | Age: 26
End: 2021-11-26

## 2021-11-26 ENCOUNTER — ANESTHESIA (OUTPATIENT)
Dept: PERIOP | Facility: HOSPITAL | Age: 26
End: 2021-11-26

## 2021-11-26 VITALS
DIASTOLIC BLOOD PRESSURE: 78 MMHG | TEMPERATURE: 98.4 F | SYSTOLIC BLOOD PRESSURE: 126 MMHG | HEART RATE: 84 BPM | RESPIRATION RATE: 18 BRPM | WEIGHT: 256 LBS | BODY MASS INDEX: 41.14 KG/M2 | HEIGHT: 66 IN | OXYGEN SATURATION: 97 %

## 2021-11-26 DIAGNOSIS — L73.2 HIDRADENITIS SUPPURATIVA: ICD-10-CM

## 2021-11-26 LAB — B-HCG UR QL: NEGATIVE

## 2021-11-26 PROCEDURE — 25010000002 ONDANSETRON PER 1 MG: Performed by: NURSE ANESTHETIST, CERTIFIED REGISTERED

## 2021-11-26 PROCEDURE — 25010000002 DEXAMETHASONE PER 1 MG

## 2021-11-26 PROCEDURE — 81025 URINE PREGNANCY TEST: CPT | Performed by: SURGERY

## 2021-11-26 PROCEDURE — 25010000002 DEXAMETHASONE PER 1 MG: Performed by: NURSE ANESTHETIST, CERTIFIED REGISTERED

## 2021-11-26 PROCEDURE — 25010000002 KETOROLAC TROMETHAMINE PER 15 MG: Performed by: NURSE ANESTHETIST, CERTIFIED REGISTERED

## 2021-11-26 PROCEDURE — 11450 EXC SKN HDRDNT AX SMPL/NTRM: CPT | Performed by: SURGERY

## 2021-11-26 PROCEDURE — 88304 TISSUE EXAM BY PATHOLOGIST: CPT | Performed by: SURGERY

## 2021-11-26 PROCEDURE — 25010000002 MIDAZOLAM PER 1MG: Performed by: ANESTHESIOLOGY

## 2021-11-26 PROCEDURE — 0 CEFAZOLIN IN DEXTROSE 2-4 GM/100ML-% SOLUTION: Performed by: SURGERY

## 2021-11-26 PROCEDURE — 25010000002 BUPRENORPHINE PER 0.1 MG

## 2021-11-26 PROCEDURE — 25010000002 PROPOFOL 10 MG/ML EMULSION: Performed by: NURSE ANESTHETIST, CERTIFIED REGISTERED

## 2021-11-26 PROCEDURE — 25010000002 METOCLOPRAMIDE PER 10 MG: Performed by: ANESTHESIOLOGY

## 2021-11-26 RX ORDER — METOCLOPRAMIDE HYDROCHLORIDE 5 MG/ML
10 INJECTION INTRAMUSCULAR; INTRAVENOUS
Status: DISCONTINUED | OUTPATIENT
Start: 2021-11-26 | End: 2021-11-26 | Stop reason: HOSPADM

## 2021-11-26 RX ORDER — SODIUM CHLORIDE 0.9 % (FLUSH) 0.9 %
10 SYRINGE (ML) INJECTION EVERY 12 HOURS SCHEDULED
Status: DISCONTINUED | OUTPATIENT
Start: 2021-11-26 | End: 2021-11-26 | Stop reason: HOSPADM

## 2021-11-26 RX ORDER — ROCURONIUM BROMIDE 10 MG/ML
INJECTION, SOLUTION INTRAVENOUS AS NEEDED
Status: DISCONTINUED | OUTPATIENT
Start: 2021-11-26 | End: 2021-11-26 | Stop reason: SURG

## 2021-11-26 RX ORDER — DEXMEDETOMIDINE HYDROCHLORIDE 100 UG/ML
INJECTION, SOLUTION INTRAVENOUS AS NEEDED
Status: DISCONTINUED | OUTPATIENT
Start: 2021-11-26 | End: 2021-11-26 | Stop reason: SURG

## 2021-11-26 RX ORDER — PROMETHAZINE HYDROCHLORIDE 12.5 MG/1
25 TABLET ORAL ONCE AS NEEDED
Status: DISCONTINUED | OUTPATIENT
Start: 2021-11-26 | End: 2021-11-26 | Stop reason: HOSPADM

## 2021-11-26 RX ORDER — HYDROCODONE BITARTRATE AND ACETAMINOPHEN 5; 325 MG/1; MG/1
1 TABLET ORAL EVERY 6 HOURS PRN
Qty: 12 TABLET | Refills: 0 | Status: SHIPPED | OUTPATIENT
Start: 2021-11-26 | End: 2022-12-05

## 2021-11-26 RX ORDER — METOCLOPRAMIDE HYDROCHLORIDE 5 MG/ML
10 INJECTION INTRAMUSCULAR; INTRAVENOUS ONCE AS NEEDED
Status: COMPLETED | OUTPATIENT
Start: 2021-11-26 | End: 2021-11-26

## 2021-11-26 RX ORDER — ESMOLOL HYDROCHLORIDE 10 MG/ML
INJECTION INTRAVENOUS AS NEEDED
Status: DISCONTINUED | OUTPATIENT
Start: 2021-11-26 | End: 2021-11-26 | Stop reason: SURG

## 2021-11-26 RX ORDER — ACETAMINOPHEN 500 MG
1000 TABLET ORAL ONCE
Status: COMPLETED | OUTPATIENT
Start: 2021-11-26 | End: 2021-11-26

## 2021-11-26 RX ORDER — SUCCINYLCHOLINE/SOD CL,ISO/PF 100 MG/5ML
SYRINGE (ML) INTRAVENOUS AS NEEDED
Status: DISCONTINUED | OUTPATIENT
Start: 2021-11-26 | End: 2021-11-26 | Stop reason: SURG

## 2021-11-26 RX ORDER — SODIUM CHLORIDE 0.9 % (FLUSH) 0.9 %
10 SYRINGE (ML) INJECTION AS NEEDED
Status: DISCONTINUED | OUTPATIENT
Start: 2021-11-26 | End: 2021-11-26 | Stop reason: HOSPADM

## 2021-11-26 RX ORDER — SODIUM CHLORIDE, SODIUM LACTATE, POTASSIUM CHLORIDE, CALCIUM CHLORIDE 600; 310; 30; 20 MG/100ML; MG/100ML; MG/100ML; MG/100ML
50 INJECTION, SOLUTION INTRAVENOUS CONTINUOUS
Status: DISCONTINUED | OUTPATIENT
Start: 2021-11-26 | End: 2021-11-26 | Stop reason: HOSPADM

## 2021-11-26 RX ORDER — LIDOCAINE HYDROCHLORIDE 20 MG/ML
INJECTION, SOLUTION INFILTRATION; PERINEURAL AS NEEDED
Status: DISCONTINUED | OUTPATIENT
Start: 2021-11-26 | End: 2021-11-26 | Stop reason: SURG

## 2021-11-26 RX ORDER — ONDANSETRON 2 MG/ML
INJECTION INTRAMUSCULAR; INTRAVENOUS AS NEEDED
Status: DISCONTINUED | OUTPATIENT
Start: 2021-11-26 | End: 2021-11-26 | Stop reason: SURG

## 2021-11-26 RX ORDER — KETAMINE HYDROCHLORIDE 50 MG/ML
INJECTION, SOLUTION, CONCENTRATE INTRAMUSCULAR; INTRAVENOUS AS NEEDED
Status: DISCONTINUED | OUTPATIENT
Start: 2021-11-26 | End: 2021-11-26 | Stop reason: SURG

## 2021-11-26 RX ORDER — MIDAZOLAM HYDROCHLORIDE 2 MG/2ML
2 INJECTION, SOLUTION INTRAMUSCULAR; INTRAVENOUS ONCE
Status: COMPLETED | OUTPATIENT
Start: 2021-11-26 | End: 2021-11-26

## 2021-11-26 RX ORDER — DEXAMETHASONE SODIUM PHOSPHATE 4 MG/ML
INJECTION, SOLUTION INTRA-ARTICULAR; INTRALESIONAL; INTRAMUSCULAR; INTRAVENOUS; SOFT TISSUE AS NEEDED
Status: DISCONTINUED | OUTPATIENT
Start: 2021-11-26 | End: 2021-11-26 | Stop reason: SURG

## 2021-11-26 RX ORDER — PROMETHAZINE HYDROCHLORIDE 25 MG/1
25 SUPPOSITORY RECTAL ONCE AS NEEDED
Status: DISCONTINUED | OUTPATIENT
Start: 2021-11-26 | End: 2021-11-26 | Stop reason: HOSPADM

## 2021-11-26 RX ORDER — PHENYLEPHRINE HCL IN 0.9% NACL 1 MG/10 ML
SYRINGE (ML) INTRAVENOUS AS NEEDED
Status: DISCONTINUED | OUTPATIENT
Start: 2021-11-26 | End: 2021-11-26 | Stop reason: SURG

## 2021-11-26 RX ORDER — MEPERIDINE HYDROCHLORIDE 25 MG/ML
12.5 INJECTION INTRAMUSCULAR; INTRAVENOUS; SUBCUTANEOUS
Status: DISCONTINUED | OUTPATIENT
Start: 2021-11-26 | End: 2021-11-26 | Stop reason: HOSPADM

## 2021-11-26 RX ORDER — ONDANSETRON 2 MG/ML
4 INJECTION INTRAMUSCULAR; INTRAVENOUS ONCE AS NEEDED
Status: DISCONTINUED | OUTPATIENT
Start: 2021-11-26 | End: 2021-11-26 | Stop reason: HOSPADM

## 2021-11-26 RX ORDER — PROPOFOL 10 MG/ML
VIAL (ML) INTRAVENOUS AS NEEDED
Status: DISCONTINUED | OUTPATIENT
Start: 2021-11-26 | End: 2021-11-26 | Stop reason: SURG

## 2021-11-26 RX ORDER — POLYETHYLENE GLYCOL 3350 17 G/17G
17 POWDER, FOR SOLUTION ORAL DAILY
Qty: 5 PACKET | Refills: 0 | Status: SHIPPED | OUTPATIENT
Start: 2021-11-26 | End: 2022-12-05

## 2021-11-26 RX ORDER — CEFAZOLIN SODIUM 2 G/100ML
2 INJECTION, SOLUTION INTRAVENOUS ONCE
Status: COMPLETED | OUTPATIENT
Start: 2021-11-26 | End: 2021-11-26

## 2021-11-26 RX ORDER — KETOROLAC TROMETHAMINE 30 MG/ML
INJECTION, SOLUTION INTRAMUSCULAR; INTRAVENOUS AS NEEDED
Status: DISCONTINUED | OUTPATIENT
Start: 2021-11-26 | End: 2021-11-26 | Stop reason: SURG

## 2021-11-26 RX ORDER — OXYCODONE HYDROCHLORIDE 5 MG/1
5 TABLET ORAL
Status: DISCONTINUED | OUTPATIENT
Start: 2021-11-26 | End: 2021-11-26 | Stop reason: HOSPADM

## 2021-11-26 RX ORDER — SODIUM CHLORIDE, SODIUM LACTATE, POTASSIUM CHLORIDE, CALCIUM CHLORIDE 600; 310; 30; 20 MG/100ML; MG/100ML; MG/100ML; MG/100ML
9 INJECTION, SOLUTION INTRAVENOUS CONTINUOUS PRN
Status: DISCONTINUED | OUTPATIENT
Start: 2021-11-26 | End: 2021-11-26 | Stop reason: HOSPADM

## 2021-11-26 RX ORDER — MAGNESIUM HYDROXIDE 1200 MG/15ML
LIQUID ORAL AS NEEDED
Status: DISCONTINUED | OUTPATIENT
Start: 2021-11-26 | End: 2021-11-26 | Stop reason: HOSPADM

## 2021-11-26 RX ADMIN — ROCURONIUM BROMIDE 5 MG: 10 INJECTION INTRAVENOUS at 09:05

## 2021-11-26 RX ADMIN — MIDAZOLAM HYDROCHLORIDE 2 MG: 1 INJECTION, SOLUTION INTRAMUSCULAR; INTRAVENOUS at 08:44

## 2021-11-26 RX ADMIN — KETAMINE HYDROCHLORIDE 25 MG: 50 INJECTION, SOLUTION INTRAMUSCULAR; INTRAVENOUS at 09:05

## 2021-11-26 RX ADMIN — METOCLOPRAMIDE HYDROCHLORIDE 10 MG: 5 INJECTION INTRAMUSCULAR; INTRAVENOUS at 08:45

## 2021-11-26 RX ADMIN — OXYCODONE HYDROCHLORIDE 5 MG: 5 TABLET ORAL at 10:26

## 2021-11-26 RX ADMIN — Medication 160 MG: at 09:05

## 2021-11-26 RX ADMIN — KETOROLAC TROMETHAMINE 30 MG: 30 INJECTION, SOLUTION INTRAMUSCULAR; INTRAVENOUS at 09:34

## 2021-11-26 RX ADMIN — DEXAMETHASONE SODIUM PHOSPHATE 4 MG: 4 INJECTION INTRA-ARTICULAR; INTRALESIONAL; INTRAMUSCULAR; INTRAVENOUS; SOFT TISSUE at 09:11

## 2021-11-26 RX ADMIN — ESMOLOL HYDROCHLORIDE 25 MG: 10 INJECTION INTRAVENOUS at 09:05

## 2021-11-26 RX ADMIN — ACETAMINOPHEN 1000 MG: 500 TABLET ORAL at 07:40

## 2021-11-26 RX ADMIN — SODIUM CHLORIDE, POTASSIUM CHLORIDE, SODIUM LACTATE AND CALCIUM CHLORIDE 9 ML/HR: 600; 310; 30; 20 INJECTION, SOLUTION INTRAVENOUS at 07:42

## 2021-11-26 RX ADMIN — DEXMEDETOMIDINE HYDROCHLORIDE 16 MCG: 100 INJECTION, SOLUTION, CONCENTRATE INTRAVENOUS at 09:12

## 2021-11-26 RX ADMIN — Medication 200 MCG: at 09:21

## 2021-11-26 RX ADMIN — PROPOFOL 150 MG: 10 INJECTION, EMULSION INTRAVENOUS at 09:05

## 2021-11-26 RX ADMIN — Medication 200 MCG: at 09:23

## 2021-11-26 RX ADMIN — KETAMINE HYDROCHLORIDE 25 MG: 50 INJECTION, SOLUTION INTRAMUSCULAR; INTRAVENOUS at 09:12

## 2021-11-26 RX ADMIN — LIDOCAINE HYDROCHLORIDE 100 MG: 20 INJECTION, SOLUTION INFILTRATION; PERINEURAL at 09:05

## 2021-11-26 RX ADMIN — DEXMEDETOMIDINE HYDROCHLORIDE 12 MCG: 100 INJECTION, SOLUTION, CONCENTRATE INTRAVENOUS at 09:08

## 2021-11-26 RX ADMIN — DEXMEDETOMIDINE HYDROCHLORIDE 12 MCG: 100 INJECTION, SOLUTION, CONCENTRATE INTRAVENOUS at 09:05

## 2021-11-26 RX ADMIN — CEFAZOLIN SODIUM 2 G: 2 INJECTION, SOLUTION INTRAVENOUS at 09:10

## 2021-11-26 RX ADMIN — PROPOFOL 50 MG: 10 INJECTION, EMULSION INTRAVENOUS at 09:11

## 2021-11-26 RX ADMIN — ONDANSETRON 4 MG: 2 INJECTION INTRAMUSCULAR; INTRAVENOUS at 09:11

## 2021-11-26 NOTE — H&P
Quit smoking    History of present illness:    Previously seen.  History of hidradenitis currently on no therapy.  She smokes daily.  History of this for 5 to 6 years with previous multiple abscess to bilateral axilla with staph infection.  No history of MRSA.       She has had increasing pain to her left axilla and developing an open wound with drainage.  Several other areas around that with pain.  No fever.  Still smoking.  She has not seen dermatology.  No changes in health or medications since last seen.        History:  Medical History        Past Medical History:   Diagnosis Date   • Anxiety     • Chemical dependency (HCC) 06/2019   • Depression     • Hepatitis     • History of methamphetamine abuse (HCC) 06/11/2020     Cont avoidance of drug use, especially now since pt is pregnant   • History of self-harm 06/11/2020   • Hydradenitis     • Hyperlipemia     • Nicotine dependence     • Night sweats     • Pregnancy     • Shortness of breath     • STD (sexually transmitted disease)     • Suicidal ideations 06/11/2020     Discussed suicidal thoughts. PT signed safety contract today. If thoughts return pt needs to call 911 or go immediately to ER for treatment. We will refer asap to Psychiatry for counseling. Discussed safety plan with pt. Discussed need to lock up all weapons and Meds in the home. Encouraged Pt to find things pt enjoys. Discussed different med options and side effects. RTC for re-evaluation.             Surgical History         Past Surgical History:   Procedure Laterality Date   • PILONIDAL CYST DRAINAGE         at age 11                  Family History   Problem Relation Age of Onset   • Thyroid cancer Mother           Gland, Malignant   • Cancer Mother           Social History            Tobacco Use   • Smoking status: Current Every Day Smoker       Packs/day: 0.50       Years: 15.00       Pack years: 7.50   • Smokeless tobacco: Never Used   • Tobacco comment: Smoked 11-20 years   Vaping Use    • Vaping Use: Never used   Substance Use Topics   • Alcohol use: Never   • Drug use: Not Currently       Comment: Former, Meth Yessi,3,2019         Review of Systems  All systems were reviewed and negative except for:   Review of Systems   Constitutional: Negative for chills, fever and unexpected weight loss.   HENT: Negative for congestion, nosebleeds and voice change.    Eyes: Negative for blurred vision, double vision and discharge.   Respiratory: Negative for apnea, chest tightness and shortness of breath.    Cardiovascular: Negative for chest pain and leg swelling.   Gastrointestinal: No nausea vomiting diarrhea   Endocrine: Negative for cold intolerance and heat intolerance.   Genitourinary: Negative for dysuria, hematuria and urgency.   Musculoskeletal: Negative for back pain, joint swelling and neck pain.   Skin: Negative for color change and dry skin.  See HPI  Neurological: Negative for dizziness and confusion.   Hematological: Negative for adenopathy.   Psychiatric/Behavioral: Negative for agitation and behavioral problems.      MEDS:          Prior to Admission medications    Medication Sig Start Date End Date Taking? Authorizing Provider   acyclovir (ZOVIRAX) 400 MG tablet Take 400 mg by mouth 3 (Three) Times a Day. Take no more than 5 doses a day.     Yes Pritesh Rajput MD   buPROPion XL (WELLBUTRIN XL) 300 MG 24 hr tablet Take 300 mg by mouth Daily. 5/28/21   Yes Pritesh Rajput MD   busPIRone (BUSPAR) 10 MG tablet Take 20 mg by mouth 3 (Three) Times a Day. 4/13/21   Yes Pritesh Rajput MD   glycopyrrolate (ROBINUL) 1 MG tablet Take 1 mg by mouth every night at bedtime. 5/22/21   Yes Pritesh Rajput MD   ibuprofen (ADVIL,MOTRIN) 600 MG tablet Take 1 tablet by mouth Every 6 (Six) Hours As Needed for Mild Pain . 10/5/21   Yes Jennifer Cheatham DO   prazosin (MINIPRESS) 1 MG capsule Take 3 mg by mouth every night at bedtime. 5/10/21   Yes Pritesh Rajput MD   sertraline  (ZOLOFT) 100 MG tablet TAKE 2 TABLETS BY MOUTH EVERY DAY FOR ANXIETY AND DEPRESSION 4/28/21   Yes Pritesh Rajput MD   acyclovir (ZOVIRAX) 400 MG tablet Take 400 mg by mouth 3 (Three) Times a Day. 3/26/21     Pritesh Rajput MD   ibuprofen (ADVIL,MOTRIN) 600 MG tablet Take 600 mg by mouth Every 6 (Six) Hours. 5/28/21     Pritesh Rajput MD   ID Now COVID-19 kit See Admin Instructions. for testing 5/15/21     Pritesh Rajput MD         Allergies:  Patient has no known allergies.           Objective         Vital Signs      Physical Exam:                General Appearance:    Alert, cooperative, in no acute distress   Head:    Normocephalic, without obvious abnormality, atraumatic   Eyes:          Conjunctivae and sclerae normal, no icterus,      Ears:    Ears appear intact with no abnormalities noted   Throat:   No oral lesions, no thrush, oral mucosa moist   Neck:   No adenopathy, supple, trachea midline, no thyromegaly   Back:     No kyphosis present, no scoliosis present, no skin lesions,      erythema or scars, no tenderness to percussion or                   palpation,   range of motion normal   Lungs:     Clear to auscultation,respirations regular, even and                  unlabored    Heart:    Regular rhythm and normal rate, normal S1 and S2, no            murmur, no gallop, no rub, no click   Chest Wall:    No abnormalities observed   Abdomen:     Normal bowel sounds, no masses, no organomegaly, soft        non-tender, non-distended, no guarding, no rebound                tenderness   Rectal:        Extremities:   Moves all extremities well, no edema, no cyanosis, no             redness   Pulses:   Pulses palpable and equal bilaterally   Skin:   No bleeding, bruising or rash, open sore 1 x 2 cm without evidence of active infection to the left axilla   Lymph nodes:   No palpable adenopathy   Neurologic:   A/o x 4 with no deficits         Results Review:              {Results  "Review:74572::\"I reviewed the patient's new clinical results.\"     LABS/IMAGING:        Results for orders placed or performed in visit on 10/13/21   Wound Culture - Wound, Axilla, Left     Specimen: Axilla, Left; Wound   Result Value Ref Range     Wound Culture Moderate growth (3+) Normal Skin Bushra       Gram Stain No organisms seen                 Result Review    :            Assessment/Plan         Symptomatic left axilla hidradenitis  Tobacco abuse     I had a discussion with the patient.  With her symptoms I recommended excision of the left axilla disease in the operating room.  I explained the procedure and recovery.  Benefits and alternatives discussed.  Risk of procedure including risk of anesthesia, bleeding, infection, wound problems, recurrence, scarring, pain were discussed.  All questions answered.    "

## 2021-11-26 NOTE — ANESTHESIA POSTPROCEDURE EVALUATION
Patient: Abbe Amos    Procedure Summary     Date: 11/26/21 Room / Location: [facility] OR  / [facility] MAIN OR    Anesthesia Start: 0859 Anesthesia Stop: 0952    Procedure: EXCISION CYST left axilla (Left Axilla) Diagnosis:       Hidradenitis suppurativa      (Hidradenitis suppurativa [L73.2])    Surgeons: Serg Sterling MD Provider: Bud Tavera MD    Anesthesia Type: general ASA Status: 3          Anesthesia Type: general    Vitals  Vitals Value Taken Time   /60 11/26/21 1041   Temp 36.6 °C (97.9 °F) 11/26/21 0951   Pulse 74 11/26/21 1044   Resp 16 11/26/21 1030   SpO2 94 % 11/26/21 1044   Vitals shown include unvalidated device data.        Post Anesthesia Care and Evaluation    Patient location during evaluation: bedside  Patient participation: complete - patient participated  Level of consciousness: awake and alert  Pain management: adequate  Airway patency: patent  Anesthetic complications: No anesthetic complications  PONV Status: none  Cardiovascular status: acceptable  Respiratory status: acceptable  Hydration status: acceptable

## 2021-11-26 NOTE — ANESTHESIA PREPROCEDURE EVALUATION
Anesthesia Evaluation     Patient summary reviewed and Nursing notes reviewed   no history of anesthetic complications:  NPO Solid Status: > 8 hours  NPO Liquid Status: > 2 hours           Airway   Mallampati: II  TM distance: >3 FB  Neck ROM: full  No difficulty expected  Dental      Pulmonary - normal exam    breath sounds clear to auscultation  (+) shortness of breath,   Cardiovascular - normal exam  Exercise tolerance: good (4-7 METS)    Rhythm: regular    (+) hyperlipidemia,       Neuro/Psych  (+) numbness, psychiatric history,     GI/Hepatic/Renal/Endo    (+)   hepatitis, liver disease,     Musculoskeletal (-) negative ROS    Abdominal    Substance History - negative use     OB/GYN    (+) Pregnant,         Other - negative ROS                       Anesthesia Plan    ASA 3     general   (Patient understands anesthesia not responsible for dental damage.)  intravenous induction     Anesthetic plan, all risks, benefits, and alternatives have been provided, discussed and informed consent has been obtained with: patient.  Use of blood products discussed with patient .   Plan discussed with CRNA.

## 2021-11-26 NOTE — OP NOTE
OP NOTE  EXCISION LESION  Procedure Report    Patient Name:  Abbe Amos  YOB: 1995  [id number]    Date of Surgery:  11/26/2021     Indications: See last clinic note for indications, discussion of risk benefits and alternatives    Pre-op Diagnosis:   Hidradenitis suppurativa [L73.2]       Post-Op Diagnosis Codes:     * Hidradenitis suppurativa [L73.2]    Procedure/CPT® Codes:      Procedure(s):  EXCISION of left axilla hidradenitis 7 x 2 x 2 cm.    Staff:  Surgeon(s):  Serg Sterling MD    Assistant: Kem Guan    Anesthesia: General, Local    Estimated Blood Loss: 5 cc    Implants:    Nothing was implanted during the procedure    Specimen:          Specimens     ID Source Type Tests Collected By Collected At Frozen?    A Axilla, Left Tissue · TISSUE PATHOLOGY EXAM   Serg Sterling MD 11/26/21 0906 No    Description: Left axilla hidradenitis [id number]            Findings: 7 x 2 x 2 cm of hidradenitis excised    Complications: None    Description of Procedure: After all questions were answered, site marked, consent was verified.  She was brought the operating room per stretcher placed in supine position arms out all extremities padded.  Bilateral lower extremity SCDs placed.  General tracheal anesthesia induced.  Preoperative IV antibiotics administered.  Patient's left axilla was prepped with ChloraPrep.  We waited 3 minutes.  Draped in usual sterile fashion.  Ioban applied.  Critical timeout was taken.  I made an oblique incision in Jeramy's lines in the left axilla excising a 7 by 2 x 2 centimeter portion of axillary skin containing hidradenitis.  I excised back to healthy tissue.  It was sent to pathology.  I obtained hemostasis.  I closed the incisions with Vicryl, nylon.  I infiltrated with local.  Dressing applied.  At the end of the procedure, all counts were correct.  I was present for the procedure.    Serg Sterling MD     Date: 11/26/2021  Time: 09:51 EST

## 2021-11-29 LAB
CYTO UR: NORMAL
LAB AP CASE REPORT: NORMAL
LAB AP CLINICAL INFORMATION: NORMAL
PATH REPORT.FINAL DX SPEC: NORMAL
PATH REPORT.GROSS SPEC: NORMAL

## 2021-12-03 ENCOUNTER — TELEPHONE (OUTPATIENT)
Dept: SURGERY | Facility: CLINIC | Age: 26
End: 2021-12-03

## 2021-12-03 NOTE — TELEPHONE ENCOUNTER
Called pt and told her drainage sounds normal and if it worsens or she develops a fever to call office for an appt.

## 2021-12-03 NOTE — TELEPHONE ENCOUNTER
Pt had surgery last with Dr Sterling.  The incision has been leaking in last couple days.  It is mostly clear.  No fever or redness around the area.

## 2021-12-09 ENCOUNTER — TELEPHONE (OUTPATIENT)
Dept: UROLOGY | Facility: CLINIC | Age: 26
End: 2021-12-09

## 2021-12-13 NOTE — TELEPHONE ENCOUNTER
Called derm specialist and spoke with Darlene. They did not have the correct phone number or address for the pt. Made the appt with Morro CRAWLEY with derm specialists. 3-2-22 @ 9:30. Called the pt and made her aware of appt.

## 2021-12-14 ENCOUNTER — OFFICE VISIT (OUTPATIENT)
Dept: OBSTETRICS AND GYNECOLOGY | Facility: CLINIC | Age: 26
End: 2021-12-14

## 2021-12-14 VITALS
DIASTOLIC BLOOD PRESSURE: 85 MMHG | WEIGHT: 253 LBS | BODY MASS INDEX: 40.66 KG/M2 | HEART RATE: 71 BPM | HEIGHT: 66 IN | SYSTOLIC BLOOD PRESSURE: 144 MMHG

## 2021-12-14 DIAGNOSIS — Z30.432 ENCOUNTER FOR IUD REMOVAL: ICD-10-CM

## 2021-12-14 DIAGNOSIS — F41.9 ANXIETY AND DEPRESSION: Primary | ICD-10-CM

## 2021-12-14 DIAGNOSIS — F32.A ANXIETY AND DEPRESSION: Primary | ICD-10-CM

## 2021-12-14 PROCEDURE — 58301 REMOVE INTRAUTERINE DEVICE: CPT | Performed by: NURSE PRACTITIONER

## 2021-12-14 NOTE — PROGRESS NOTES
"IUD Removal      CC: Presents for IUD removal     I reviewed the procedure in detail.  She understand the potential risks include, but are not limited to, pain, bleeding, and infection.  Her questions have been answered.    Subjective:  4-9-21 Mirena IUD placed, \"Mood is all over the place\" since IUD placed, under care of psych and on psych meds,  No Suicide ideation, unpredicatable spotting lasting up to 2 weeks since Mirena. Currently lactating. Desires removal. Currently abstaining from intercourse, does not desire contraception.    Objective:  /85   Pulse 71   Ht 167.6 cm (66\")   Wt 115 kg (253 lb)   LMP  (LMP Unknown) Comment: IUD MIERNA IN PLACE   Breastfeeding Yes   BMI 40.84 kg/m²   General- NAD, alert and oriented, appropriate  Psych- Normal mood, good memory  Abdomen- Soft, non distended, non tender, no masses  External genitalia- Normal, no lesions  Vagina- Normal, no discharge  Cvx- Normal without lesion or discharge. IUD REMOVAL: Strings visualized, IUD removed intact.  Discarded.  Patient tolerated the procedure well.    ASSESSMENT AND PLAN:    Diagnoses and all orders for this visit:    1. Anxiety and depression (Primary)    2. Encounter for IUD removal      Counseling:  She understand she can become pregnant immediately.  I recommend she keep track of menses and RTO if <q21d, >7d long, heavy or painful.  R/B/A/SE/efficacy of all BC options reviewed with respect to her individual medical history.   She has decided on None for BC.  If she desires pregnancy I have encouraged a healthy lifestyle and PNV.   Psych/PCP mgmt anxiety and depression  PRECAUTIONS - She was advised to watch for fever, chills, vaginal discharge or odor.      Follow Up:  Return as needed.  "

## 2021-12-16 ENCOUNTER — OFFICE VISIT (OUTPATIENT)
Dept: SURGERY | Facility: CLINIC | Age: 26
End: 2021-12-16

## 2021-12-16 VITALS — HEIGHT: 66 IN | RESPIRATION RATE: 16 BRPM | BODY MASS INDEX: 40.66 KG/M2 | WEIGHT: 253 LBS

## 2021-12-16 DIAGNOSIS — L73.2 HIDRADENITIS SUPPURATIVA: Primary | ICD-10-CM

## 2021-12-16 PROCEDURE — 99024 POSTOP FOLLOW-UP VISIT: CPT | Performed by: SURGERY

## 2021-12-16 RX ORDER — OXCARBAZEPINE 150 MG/1
TABLET, FILM COATED ORAL
COMMUNITY
Start: 2021-12-13

## 2021-12-16 NOTE — PROGRESS NOTES
General Surgery/Colorectal Surgery Note    Patient Name:  Abbe Amos  YOB: 1995  [id number]    Referring Provider: Babs Muñoz PA-C      Patient Care Team:  Babs Muñoz PA-C as PCP - General (Physician Assistant)  Serg Sterling MD as Consulting Physician (General Surgery)    Chief complaint follow-up after surgery    Subjective .     History of present illness:    History of hidradenitis suppurativa status post excision of left axilla 7 by 2 x 2 centimeter disease 11/26/2021.  Pathology consistent with hidradenitis suppurativa    She comes in for follow-up.  No fever.  She has had some clear drainage from the site.  She has stopped smoking.    History:  Past Medical History:   Diagnosis Date   • Anxiety    • Chemical dependency (HCC) 06/2019   • Depression    • Hepatitis     HEP C- NO CURRENT PROBLEMS REPORTED   • History of methamphetamine abuse (HCC) 06/11/2020    Cont avoidance of drug use, especially now since pt is pregnant   • History of self-harm 06/11/2020   • Hydradenitis    • Hyperlipemia    • Nicotine dependence    • Night sweats    • Pregnancy    • Sebaceous cyst of left axilla    • Shortness of breath     NO CURRENT PROBLEMS    • STD (sexually transmitted disease)    • Suicidal ideations 06/11/2020    Discussed suicidal thoughts. PT signed safety contract today. If thoughts return pt needs to call 911 or go immediately to ER for treatment. We will refer asap to Psychiatry for counseling. Discussed safety plan with pt. Discussed need to lock up all weapons and Meds in the home. Encouraged Pt to find things pt enjoys. Discussed different med options and side effects. RTC for re-evaluation.        Past Surgical History:   Procedure Laterality Date   • EXCISION LESION Left 11/26/2021    Procedure: EXCISION CYST left axilla;  Surgeon: Serg Sterling MD;  Location: [facility] MAIN OR;  Service: General;  Laterality: Left;   • PILONIDAL CYST DRAINAGE      at age 11        Family History   Problem Relation Age of Onset   • Thyroid cancer Mother         Gland, Malignant   • Cancer Mother    • Malig Hyperthermia Neg Hx        Social History     Tobacco Use   • Smoking status: Former Smoker     Packs/day: 0.25     Years: 15.00     Pack years: 3.75   • Smokeless tobacco: Never Used   • Tobacco comment: Smoked 11-20 years- INSTRUCTED NO SMOKING 24 HR PRIOR TO SURGERY    Vaping Use   • Vaping Use: Every day   • Substances: Nicotine   • Devices: Disposable, INSTRUCTED NO VAPING 24 HR PRIOR TO SURGERY    Substance Use Topics   • Alcohol use: Never   • Drug use: Not Currently     Types: Methamphetamines     Comment: Former, Meth USER- LAST USED 6-2020       Review of Systems  All systems were reviewed and negative except for:   Review of Systems   Constitutional: Negative for chills, fever and unexpected weight loss.   HENT: Negative for congestion, nosebleeds and voice change.    Eyes: Negative for blurred vision, double vision and discharge.   Respiratory: Negative for apnea, chest tightness and shortness of breath.    Cardiovascular: Negative for chest pain and leg swelling.   Gastrointestinal:        See HPI   Endocrine: Negative for cold intolerance and heat intolerance.   Genitourinary: Negative for dysuria, hematuria and urgency.   Musculoskeletal: Negative for back pain, joint swelling and neck pain.   Skin: Negative for color change and dry skin.   Neurological: Negative for dizziness and confusion.   Hematological: Negative for adenopathy.   Psychiatric/Behavioral: Negative for agitation and behavioral problems.     MEDS:  Prior to Admission medications    Medication Sig Start Date End Date Taking? Authorizing Provider   acyclovir (ZOVIRAX) 400 MG tablet Take 400 mg by mouth 3 (Three) Times a Day. 3/26/21   Pritesh Rajput MD   buPROPion XL (WELLBUTRIN XL) 300 MG 24 hr tablet Take 300 mg by mouth Daily. 5/28/21   Pritesh Rajput MD   busPIRone (BUSPAR) 10 MG tablet  "Take 20 mg by mouth 3 (Three) Times a Day. 4/13/21   Pritesh Rajput MD   glycopyrrolate (ROBINUL) 1 MG tablet Take 1 mg by mouth every night at bedtime. 5/22/21   Pritesh Rajput MD   HYDROcodone-acetaminophen (Norco) 5-325 MG per tablet Take 1 tablet by mouth Every 6 (Six) Hours As Needed for Mild Pain . 11/26/21   Serg Sterling MD   ibuprofen (ADVIL,MOTRIN) 600 MG tablet Take 600 mg by mouth Every 6 (Six) Hours. 5/28/21   Pritesh Rajput MD   levonorgestrel (Mirena, 52 MG,) 20 MCG/24HR IUD 1 each by Intrauterine route 1 (One) Time.    Pritesh Rajput MD   polyethylene glycol (MIRALAX) 17 g packet Take 17 g dissolved in liquid as directed by mouth Daily. 11/26/21   Serg Sterling MD   prazosin (MINIPRESS) 1 MG capsule Take 3 mg by mouth every night at bedtime. 5/10/21   Pritesh Rajput MD   sertraline (ZOLOFT) 100 MG tablet Take 200 mg by mouth Daily. 4/28/21   Pritesh Rajput MD        Allergies:  Patient has no known allergies.    Objective     Vital Signs        Physical Exam: Incision with superficial dehiscence with healthy granulation tissue        Results Review:   {Results Review:04178::\"I reviewed the patient's new clinical results.\"    LABS/IMAGING:  Results for orders placed or performed during the hospital encounter of 11/26/21   Pregnancy, Urine - Urine, Clean Catch    Specimen: Urine, Clean Catch   Result Value Ref Range    HCG, Urine QL Negative Negative   Tissue Pathology Exam    Specimen: Axilla, Left; Tissue   Result Value Ref Range    Case Report       Surgical Pathology Report                         Case: [id number]                                  Authorizing Provider:  Serg Sterling MD  Collected:           11/26/2021 09:06 AM          Ordering Location:     [facility] Received:            11/26/2021 11:07 AM                                 OR                                                                         "   Pathologist:           Renetta Jacobson MD                                                     Specimen:    Axilla, Left, Left axilla hidradenitis [id number]                                             Clinical Information      Final Diagnosis       Left axilla hidradenitis, excision:   -Hidradenitis suppurativa      Gross Description       Left axilla hidradenitis 7 x 2 x 2 cm: Received in formalin is an ellipse of dark brown to black skin measuring 6.9 x 1.8 x 1.7 cm.  Sectioning reveals cystic structures beneath the skin surface involving much of the specimen.  Rep 1A.  CRE      Microscopic Description          Result Review :{Labs  Result Review  Imaging  Med Tab  Media :23}     Assessment/Plan      History of hidradenitis suppurativa status post excision of left axilla 7 by 2 x 2 centimeter disease 11/26/2021.  Pathology consistent with hidradenitis suppurativa    I reviewed the pathology with her.  I recommended keeping the sutures in 1 more week then follow-up with me would likely silver nitrate application.  I explained her due to activity her incision has pulled apart some and will likely take a month or 2 to heal.  Continue no smoking.  All questions answered.  She agrees with the plan.  Thank you for the consult.        This document has been electronically signed by Serg Sterling MD  December 16, 2021 13:27 EST

## 2021-12-22 ENCOUNTER — OFFICE VISIT (OUTPATIENT)
Dept: SURGERY | Facility: CLINIC | Age: 26
End: 2021-12-22

## 2021-12-22 VITALS — RESPIRATION RATE: 14 BRPM | HEIGHT: 66 IN | WEIGHT: 254.2 LBS | BODY MASS INDEX: 40.85 KG/M2

## 2021-12-22 DIAGNOSIS — L73.2 HIDRADENITIS SUPPURATIVA: Primary | ICD-10-CM

## 2021-12-22 PROCEDURE — 99024 POSTOP FOLLOW-UP VISIT: CPT | Performed by: SURGERY

## 2021-12-22 NOTE — PROGRESS NOTES
General Surgery/Colorectal Surgery Note    Patient Name:  Abbe Amos  YOB: 1995  [id number]    Referring Provider: Babs Muñoz PA-C      Patient Care Team:  Babs Muñoz PA-C as PCP - General (Physician Assistant)  Serg Sterling MD as Consulting Physician (General Surgery)    Chief complaint follow-up after surgery    Subjective .     History of present illness:    History of hidradenitis suppurativa status post excision of left axilla 7 by 2 x 2 centimeter disease 11/26/2021.  Pathology consistent with hidradenitis suppurativa    She comes in for follow-up.  No changes since last seen.  She is still not smoking.      History:  Past Medical History:   Diagnosis Date   • Anxiety    • Chemical dependency (HCC) 06/2019   • Depression    • Hepatitis     HEP C- NO CURRENT PROBLEMS REPORTED   • History of methamphetamine abuse (HCC) 06/11/2020    Cont avoidance of drug use, especially now since pt is pregnant   • History of self-harm 06/11/2020   • Hydradenitis    • Hyperlipemia    • Nicotine dependence    • Night sweats    • Pregnancy    • Sebaceous cyst of left axilla    • Shortness of breath     NO CURRENT PROBLEMS    • STD (sexually transmitted disease)    • Suicidal ideations 06/11/2020    Discussed suicidal thoughts. PT signed safety contract today. If thoughts return pt needs to call 911 or go immediately to ER for treatment. We will refer asap to Psychiatry for counseling. Discussed safety plan with pt. Discussed need to lock up all weapons and Meds in the home. Encouraged Pt to find things pt enjoys. Discussed different med options and side effects. RTC for re-evaluation.        Past Surgical History:   Procedure Laterality Date   • EXCISION LESION Left 11/26/2021    Procedure: EXCISION CYST left axilla;  Surgeon: Serg Sterling MD;  Location: [facility] MAIN OR;  Service: General;  Laterality: Left;   • PILONIDAL CYST DRAINAGE      at age 11       Family History   Problem  Relation Age of Onset   • Thyroid cancer Mother         Gland, Malignant   • Cancer Mother    • Malig Hyperthermia Neg Hx        Social History     Tobacco Use   • Smoking status: Former Smoker     Packs/day: 0.25     Years: 15.00     Pack years: 3.75   • Smokeless tobacco: Never Used   • Tobacco comment: Smoked 11-20 years- INSTRUCTED NO SMOKING 24 HR PRIOR TO SURGERY    Vaping Use   • Vaping Use: Every day   • Substances: Nicotine   • Devices: Disposable, INSTRUCTED NO VAPING 24 HR PRIOR TO SURGERY    Substance Use Topics   • Alcohol use: Never   • Drug use: Not Currently     Types: Methamphetamines     Comment: Former, Meth USER- LAST USED 6-2020       Review of Systems  All systems were reviewed and negative except for:   Review of Systems   Constitutional: Negative for chills, fever and unexpected weight loss.   HENT: Negative for congestion, nosebleeds and voice change.    Eyes: Negative for blurred vision, double vision and discharge.   Respiratory: Negative for apnea, chest tightness and shortness of breath.    Cardiovascular: Negative for chest pain and leg swelling.   Gastrointestinal:        See HPI   Endocrine: Negative for cold intolerance and heat intolerance.   Genitourinary: Negative for dysuria, hematuria and urgency.   Musculoskeletal: Negative for back pain, joint swelling and neck pain.   Skin: Negative for color change and dry skin.   Neurological: Negative for dizziness and confusion.   Hematological: Negative for adenopathy.   Psychiatric/Behavioral: Negative for agitation and behavioral problems.     MEDS:  Prior to Admission medications    Medication Sig Start Date End Date Taking? Authorizing Provider   acyclovir (ZOVIRAX) 400 MG tablet Take 400 mg by mouth 3 (Three) Times a Day. 3/26/21  Yes Provider, MD Pritesh   buPROPion XL (WELLBUTRIN XL) 300 MG 24 hr tablet Take 300 mg by mouth Daily. 5/28/21  Yes Provider, MD Pritesh   busPIRone (BUSPAR) 10 MG tablet Take 20 mg by mouth 3  "(Three) Times a Day. 4/13/21  Yes Pritesh Rajput MD   glycopyrrolate (ROBINUL) 1 MG tablet Take 1 mg by mouth every night at bedtime. 5/22/21  Yes Pritesh Rajput MD   ibuprofen (ADVIL,MOTRIN) 600 MG tablet Take 600 mg by mouth Every 6 (Six) Hours. 5/28/21  Yes Pritesh Rajput MD   levonorgestrel (Mirena, 52 MG,) 20 MCG/24HR IUD 1 each by Intrauterine route 1 (One) Time.   Yes Pritesh Rajput MD   OXcarbazepine (TRILEPTAL) 150 MG tablet TAKE 1 TABLET BY MOUTH ONCE DAILY FOR 7 DAYS, THEN INCREASE TO TWICE DAILY THEREAFTER 12/13/21  Yes Pritesh Rajput MD   prazosin (MINIPRESS) 1 MG capsule Take 3 mg by mouth every night at bedtime. 5/10/21  Yes Pritesh Rajput MD   sertraline (ZOLOFT) 100 MG tablet Take 200 mg by mouth Daily. 4/28/21  Yes Pritesh Rajput MD   HYDROcodone-acetaminophen (Norco) 5-325 MG per tablet Take 1 tablet by mouth Every 6 (Six) Hours As Needed for Mild Pain . 11/26/21   Serg Sterling MD   polyethylene glycol (MIRALAX) 17 g packet Take 17 g dissolved in liquid as directed by mouth Daily. 11/26/21   Serg Sterling MD        Allergies:  Patient has no known allergies.    Objective     Vital Signs   Resp:  [14] 14     Physical Exam: Incision is partially dehisced superficial with healthy granulation tissue, no evidence of recurrence or infection      Results Review:   {Results Review:18535::\"I reviewed the patient's new clinical results.\"    LABS/IMAGING:  Results for orders placed or performed during the hospital encounter of 11/26/21   Pregnancy, Urine - Urine, Clean Catch    Specimen: Urine, Clean Catch   Result Value Ref Range    HCG, Urine QL Negative Negative   Tissue Pathology Exam    Specimen: Axilla, Left; Tissue   Result Value Ref Range    Case Report       Surgical Pathology Report                         Case: [id number]                                  Authorizing Provider:  Serg Sterling MD  Collected:           " 11/26/2021 09:06 AM          Ordering Location:     [facility] MAIN Received:            11/26/2021 11:07 AM                                 OR                                                                           Pathologist:           Renetta Jacobson MD                                                     Specimen:    Axilla, Left, Left axilla hidradenitis [id number]                                             Clinical Information      Final Diagnosis       Left axilla hidradenitis, excision:   -Hidradenitis suppurativa      Gross Description       Left axilla hidradenitis 7 x 2 x 2 cm: Received in formalin is an ellipse of dark brown to black skin measuring 6.9 x 1.8 x 1.7 cm.  Sectioning reveals cystic structures beneath the skin surface involving much of the specimen.  Rep 1A.  CRE      Microscopic Description          Result Review :     Assessment/Plan     History of hidradenitis suppurativa status post excision of left axilla 7 by 2 x 2 centimeter disease 11/26/2021.  Pathology consistent with hidradenitis suppurativa    Sutures removed today.  I offered silver nitrate to help speed up healing.  She declines.  I instructed her that this may take a month or 2 to heal.  Follow-up with me as needed.  All questions answered.  She agrees with the plan.  Thank for the consult.  Recommend follow-up with dermatology.           This document has been electronically signed by Serg Sterling MD  December 22, 2021 15:33 EST

## 2022-08-09 ENCOUNTER — APPOINTMENT (OUTPATIENT)
Dept: GENERAL RADIOLOGY | Facility: HOSPITAL | Age: 27
End: 2022-08-09

## 2022-08-09 ENCOUNTER — HOSPITAL ENCOUNTER (EMERGENCY)
Facility: HOSPITAL | Age: 27
Discharge: HOME | End: 2022-08-09
Attending: EMERGENCY MEDICINE | Admitting: EMERGENCY MEDICINE

## 2022-08-09 VITALS
TEMPERATURE: 98 F | SYSTOLIC BLOOD PRESSURE: 116 MMHG | HEART RATE: 65 BPM | BODY MASS INDEX: 41.03 KG/M2 | HEIGHT: 66 IN | DIASTOLIC BLOOD PRESSURE: 60 MMHG | RESPIRATION RATE: 14 BRPM | OXYGEN SATURATION: 100 %

## 2022-08-09 DIAGNOSIS — S93.602A FOOT SPRAIN, LEFT, INITIAL ENCOUNTER: Primary | ICD-10-CM

## 2022-08-09 PROCEDURE — 73610 X-RAY EXAM OF ANKLE: CPT

## 2022-08-09 PROCEDURE — 99283 EMERGENCY DEPT VISIT LOW MDM: CPT

## 2022-08-09 RX ORDER — IBUPROFEN 800 MG/1
800 TABLET ORAL EVERY 6 HOURS PRN
Qty: 20 TABLET | Refills: 0 | Status: SHIPPED | OUTPATIENT
Start: 2022-08-09 | End: 2022-12-05

## 2022-08-09 RX ORDER — IBUPROFEN 400 MG/1
800 TABLET ORAL ONCE
Status: COMPLETED | OUTPATIENT
Start: 2022-08-09 | End: 2022-08-09

## 2022-08-09 RX ADMIN — IBUPROFEN 800 MG: 400 TABLET, FILM COATED ORAL at 09:21

## 2022-08-09 NOTE — DISCHARGE INSTRUCTIONS
Follow-up with your primary care provider or the podiatrist whose information is been provided for you in 1 to 2 weeks if you continue to have pain and discomfort.  Please wear the Ace bandage for a few days to help with the pain.  You may also adhere to the RICE acronym as outlined in your discharge instructions for additional comfort measures.  Return to the ER if you develop any numbness or tingling to your foot, discoloration of your foot, become unable to ambulate, or if you have any other concern surrounding today's ER visit.

## 2022-08-09 NOTE — ED PROVIDER NOTES
Time: 8:44 AM EDT  Arrived by: NIA  Chief Complaint: Left foot and ankle pain  History provided by: Patient  History is limited by: N/A     History of Present Illness:  Patient is a 27 y.o. year old female who presents to the emergency department with left foot and ankle pain.  Patient states she was taking out her garbage this morning at around 720 and when she went to get back in her car rolled her ankle.  She complains the pain to be most on the top lateral side of left foot.  She rates her pain at rest as a 4 on a scale of 0-10.  Pain increases with movement.        HPI    Similar Symptoms Previously: no  Recently seen: no      Patient Care Team  Primary Care Provider: Babs Muñoz PA-C    Past Medical History:     No Known Allergies  Past Medical History:   Diagnosis Date   • Anxiety    • Chemical dependency (HCC) 06/2019   • Depression    • Hepatitis     HEP C- NO CURRENT PROBLEMS REPORTED   • History of methamphetamine abuse (HCC) 06/11/2020    Cont avoidance of drug use, especially now since pt is pregnant   • History of self-harm 06/11/2020   • Hydradenitis    • Hyperlipemia    • Nicotine dependence    • Night sweats    • Pregnancy    • Sebaceous cyst of left axilla    • Shortness of breath     NO CURRENT PROBLEMS    • STD (sexually transmitted disease)    • Suicidal ideations 06/11/2020    Discussed suicidal thoughts. PT signed safety contract today. If thoughts return pt needs to call 911 or go immediately to ER for treatment. We will refer asap to Psychiatry for counseling. Discussed safety plan with pt. Discussed need to lock up all weapons and Meds in the home. Encouraged Pt to find things pt enjoys. Discussed different med options and side effects. RTC for re-evaluation.      Past Surgical History:   Procedure Laterality Date   • EXCISION LESION Left 11/26/2021    Procedure: EXCISION CYST left axilla;  Surgeon: Serg Sterling MD;  Location: [facility] MAIN OR;  Service: General;  Laterality:  Left;   • PILONIDAL CYST DRAINAGE      at age 11     Family History   Problem Relation Age of Onset   • Thyroid cancer Mother         Gland, Malignant   • Cancer Mother    • Malig Hyperthermia Neg Hx        Home Medications:  Prior to Admission medications    Medication Sig Start Date End Date Taking? Authorizing Provider   acyclovir (ZOVIRAX) 400 MG tablet Take 400 mg by mouth 3 (Three) Times a Day. 3/26/21   Pritesh Rajput MD   buPROPion XL (WELLBUTRIN XL) 300 MG 24 hr tablet Take 300 mg by mouth Daily. 5/28/21   Pritesh Rajput MD   busPIRone (BUSPAR) 10 MG tablet Take 20 mg by mouth 3 (Three) Times a Day. 4/13/21   Pritesh Rajput MD   glycopyrrolate (ROBINUL) 1 MG tablet Take 1 mg by mouth every night at bedtime. 5/22/21   Pritesh Rajput MD   HYDROcodone-acetaminophen (Norco) 5-325 MG per tablet Take 1 tablet by mouth Every 6 (Six) Hours As Needed for Mild Pain . 11/26/21   Serg Sterling MD   ibuprofen (ADVIL,MOTRIN) 600 MG tablet Take 600 mg by mouth Every 6 (Six) Hours. 5/28/21   Pritesh Rajput MD   levonorgestrel (Mirena, 52 MG,) 20 MCG/24HR IUD 1 each by Intrauterine route 1 (One) Time.    Pritesh Rajput MD   OXcarbazepine (TRILEPTAL) 150 MG tablet TAKE 1 TABLET BY MOUTH ONCE DAILY FOR 7 DAYS, THEN INCREASE TO TWICE DAILY THEREAFTER 12/13/21   Pritesh Rajput MD   polyethylene glycol (MIRALAX) 17 g packet Take 17 g dissolved in liquid as directed by mouth Daily. 11/26/21   Serg Sterling MD   prazosin (MINIPRESS) 1 MG capsule Take 3 mg by mouth every night at bedtime. 5/10/21   Pritesh Rajput MD   sertraline (ZOLOFT) 100 MG tablet Take 200 mg by mouth Daily. 4/28/21   Pritesh Rajput MD        Social History:   Social History     Tobacco Use   • Smoking status: Former Smoker     Packs/day: 0.25     Years: 15.00     Pack years: 3.75   • Smokeless tobacco: Never Used   • Tobacco comment: Smoked 11-20 years- INSTRUCTED NO SMOKING 24 HR  "PRIOR TO SURGERY    Vaping Use   • Vaping Use: Every day   • Substances: Nicotine   • Devices: Disposable, INSTRUCTED NO VAPING 24 HR PRIOR TO SURGERY    Substance Use Topics   • Alcohol use: Never   • Drug use: Not Currently     Types: Methamphetamines     Comment: Former, Meth USER- LAST USED 6-2020         Review of Systems:  Review of Systems   Constitutional: Negative for chills and fever.   HENT: Negative for congestion, ear pain and sore throat.    Eyes: Negative for pain.   Respiratory: Negative for cough, chest tightness and shortness of breath.    Cardiovascular: Negative for chest pain.   Gastrointestinal: Negative for abdominal pain, diarrhea, nausea and vomiting.   Genitourinary: Negative for flank pain and hematuria.   Musculoskeletal: Negative for joint swelling.   Skin: Negative for pallor.   Neurological: Negative for seizures and headaches.   All other systems reviewed and are negative.       Physical Exam:  /60   Pulse 65   Temp 98 °F (36.7 °C) (Oral)   Resp 14   Ht 167.6 cm (66\")   SpO2 100%   Breastfeeding Yes   BMI 41.03 kg/m²     Physical Exam  Vitals and nursing note reviewed.   Constitutional:       General: She is not in acute distress.     Appearance: Normal appearance. She is not toxic-appearing.   HENT:      Head: Normocephalic and atraumatic.      Mouth/Throat:      Mouth: Mucous membranes are moist.   Eyes:      General: No scleral icterus.  Cardiovascular:      Rate and Rhythm: Normal rate and regular rhythm.      Pulses: Normal pulses.      Heart sounds: Normal heart sounds.   Pulmonary:      Effort: Pulmonary effort is normal. No respiratory distress.   Abdominal:      General: Abdomen is flat.      Palpations: Abdomen is soft.      Tenderness: There is no abdominal tenderness.   Musculoskeletal:         General: Swelling, tenderness and signs of injury present. No deformity. Normal range of motion.      Cervical back: Normal range of motion and neck supple.      Right " lower leg: No edema.      Left lower leg: No edema.        Feet:       Comments: There is no obvious deformity, no discoloration, range of motion intact but painful on dorsiflexion.  There is a minimal amount of swelling to the top lateral side of foot up near ankle.  PMS intact and cap refill within normal limits.   Skin:     General: Skin is warm and dry.      Capillary Refill: Capillary refill takes less than 2 seconds.      Coloration: Skin is not jaundiced or pale.      Findings: No bruising, erythema, lesion or rash.   Neurological:      Mental Status: She is alert and oriented to person, place, and time. Mental status is at baseline.      Sensory: No sensory deficit.                Medications in the Emergency Department:  Medications   ibuprofen (ADVIL,MOTRIN) tablet 800 mg (800 mg Oral Given 8/9/22 0921)        Labs  Lab Results (last 24 hours)     ** No results found for the last 24 hours. **           Imaging:  XR Ankle 3+ View Left    Result Date: 8/9/2022  PROCEDURE: XR ANKLE 3+ VW LEFT  COMPARISON: None  INDICATIONS: LEFT ANKLE PAIN POST FALL TODAY  FINDINGS:  Mineralization and osseous alignment appear within normal limits.  Prominent os trigonum is noted.  No definite displaced fracture is seen.  Joint spaces appear preserved.  Soft tissues appear unremarkable.        No definite radiographic findings of acute osseous ankle abnormality.      KEKE DE LEÓN MD       Electronically Signed and Approved By: KEKE DE LEÓN MD on 8/09/2022 at 9:02               Procedures:  Procedures    Progress                            Medical Decision Making:  MDM  Number of Diagnoses or Management Options  Foot sprain, left, initial encounter: new and does not require workup  Diagnosis management comments: I have spoke with the patient and I have explained the patient´s condition, diagnoses and treatment plan based on the information available to me at this time. I have answered all questions and addressed any  concerns. The patient has a good understanding of the patient´s diagnosis, condition, and treatment plan as can be expected at this point. The vital signs have been stable. The patient´s condition is stable and appropriate for discharge from the emergency department.      The patient will pursue further outpatient evaluation with the primary care physician or other designated or consulting physician as outlined in the discharge instructions. They are agreeable to this plan of care and follow-up instructions have been explained in detail. The patient has received these instructions in written format and have expressed an understanding of the discharge instructions. The patient is aware that any significant change in condition or worsening of symptoms should prompt an immediate return to this or the closest emergency department or call to 911.         Amount and/or Complexity of Data Reviewed  Tests in the radiology section of CPT®: ordered and reviewed  Review and summarize past medical records: yes (I have personally reviewed patient's previous medical encounters.  )    Risk of Complications, Morbidity, and/or Mortality  Presenting problems: low  Diagnostic procedures: low  Management options: low    Patient Progress  Patient progress: stable       Final diagnoses:   Foot sprain, left, initial encounter        Disposition:  ED Disposition     ED Disposition   Discharge    Condition   Stable    Comment   --             This medical record created using voice recognition software.           Roxanne Bailey, APRN  08/09/22 [id number]

## 2022-10-19 NOTE — PROGRESS NOTES
Occupational Therapy Daily Progress Note        Patient: Abbe Amos   : 1995  Diagnosis/ICD-10 Code:  Numbness and tingling in both hands [R20.0, R20.2]  Referring practitioner: No ref. provider found  Date of Initial Visit: Type: THERAPY  Noted: 2021  Today's Date: 2021  Patient seen for 2 sessions             Subjective   Abbe Amos reports: No change    Objective     See Exercise, Manual, and Modality Logs for complete treatment.       Assessment/Plan    Progress per Plan of Care           Timed:  Manual Therapy:    0     mins  98135;  Therapeutic Exercise:    23     mins  19671;     Neuromuscular Jesús:    0    mins  46424;    Therapeutic Activity:     0     mins  56091;     Ultrasound:     0     mins  88171;    Electrical Stimulation:    0     mins  60318;    Untimed:  Electrical Stimulation:    0     mins  21536 ( );  Fluidotherapy     0     mins  92334  Hot/cold pack     0     mins  76529    Timed Treatment:   23   mins   Total Treatment:     23   mins        Jeovany Cortez OT  Occupational Therapist  
195.58

## 2023-01-03 ENCOUNTER — TELEMEDICINE (OUTPATIENT)
Dept: FAMILY MEDICINE CLINIC | Facility: TELEHEALTH | Age: 28
End: 2023-01-03
Payer: COMMERCIAL

## 2023-01-03 DIAGNOSIS — K52.9 GASTROENTERITIS: Primary | ICD-10-CM

## 2023-01-03 PROCEDURE — 99213 OFFICE O/P EST LOW 20 MIN: CPT | Performed by: NURSE PRACTITIONER

## 2023-01-03 RX ORDER — ONDANSETRON 8 MG/1
8 TABLET, ORALLY DISINTEGRATING ORAL EVERY 8 HOURS PRN
Qty: 15 TABLET | Refills: 0 | Status: SHIPPED | OUTPATIENT
Start: 2023-01-03

## 2023-01-03 NOTE — PROGRESS NOTES
You have chosen to receive care through a telehealth visit.  Do you consent to use a video/audio connection for your medical care today? Yes     CHIEF COMPLAINT  No chief complaint on file.        HPI  Abbe Amos is a 27 y.o. female  presents with complaint of woke up this morning with vomiting x 1, intermittent nausea, stomach cramps.  Denies fever, abdominal pain, vomiting.  Her daughter has also been sick with the same symptoms.  She is currently breastfeeding    Review of Systems   See HPI      Past Medical History:   Diagnosis Date   • Anxiety    • Chemical dependency (HCC) 06/2019   • Depression    • Hepatitis     HEP C- NO CURRENT PROBLEMS REPORTED   • History of methamphetamine abuse (HCC) 06/11/2020    Cont avoidance of drug use, especially now since pt is pregnant   • History of self-harm 06/11/2020   • Hydradenitis    • Hyperlipemia    • Nicotine dependence    • Night sweats    • Pregnancy    • Sebaceous cyst of left axilla    • Shortness of breath     NO CURRENT PROBLEMS    • STD (sexually transmitted disease)    • Suicidal ideations 06/11/2020    Discussed suicidal thoughts. PT signed safety contract today. If thoughts return pt needs to call 911 or go immediately to ER for treatment. We will refer asap to Psychiatry for counseling. Discussed safety plan with pt. Discussed need to lock up all weapons and Meds in the home. Encouraged Pt to find things pt enjoys. Discussed different med options and side effects. RTC for re-evaluation.        Family History   Problem Relation Age of Onset   • Thyroid cancer Mother         Gland, Malignant   • Cancer Mother    • Malig Hyperthermia Neg Hx        Social History     Socioeconomic History   • Marital status: Single   Tobacco Use   • Smoking status: Some Days     Packs/day: 0.25     Years: 15.00     Pack years: 3.75     Types: Cigarettes   • Smokeless tobacco: Never   • Tobacco comments:     Smoked 11-20 years- INSTRUCTED NO SMOKING 24 HR PRIOR TO SURGERY     Vaping Use   • Vaping Use: Every day   • Substances: Nicotine   • Devices: Refillable tank, INSTRUCTED NO VAPING 24 HR PRIOR TO SURGERY    Substance and Sexual Activity   • Alcohol use: Never   • Drug use: Not Currently     Types: Methamphetamines     Comment: Former, Meth USER- LAST USED 6-2020   • Sexual activity: Defer       Abbe Amos  reports that she has been smoking cigarettes. She has a 3.75 pack-year smoking history. She has never used smokeless tobacco..              LMP 12/05/2022 (Exact Date)     PHYSICAL EXAM  Physical Exam   Constitutional: She is oriented to person, place, and time. She appears well-developed and well-nourished. She does not have a sickly appearance. She does not appear ill.   HENT:   Head: Normocephalic and atraumatic.   Pulmonary/Chest: Effort normal.  No respiratory distress.  Neurological: She is alert and oriented to person, place, and time.         Diagnoses and all orders for this visit:    1. Gastroenteritis (Primary)  -     ondansetron ODT (ZOFRAN-ODT) 8 MG disintegrating tablet; Place 1 tablet on the tongue Every 8 (Eight) Hours As Needed for Nausea or Vomiting.  Dispense: 15 tablet; Refill: 0    --take medications as prescribed  --increase fluids, rest as needed, tylenol or ibuprofen for pain  --f/u in 3-5 days if no improvement        FOLLOW-UP  As discussed during visit with PCP/[facility] if no improvement or Urgent Care/Emergency Department if worsening of symptoms    Patient verbalizes understanding of medication dosage, comfort measures, instructions for treatment and follow-up.    Sania Hawthorne, APRN  01/03/2023  16:11 EST    The use of a video visit has been reviewed with the patient and verbal informed consent has been obtained. Myself and Abbe Amos participated in this visit. The patient is located in [address] [place] [address] Dr sena [address] [place] [address].    I am located in [place], [place]. Mychart and Twilio were utilized. I spent 8 minutes in the patient's chart for  this visit.

## 2023-01-03 NOTE — LETTER
January 3, 2023     Patient: Abbe Amos   YOB: 1995   Date of Visit: 1/3/2023       To Whom It May Concern:    It is my medical opinion that Abbe Amos may return to work on Wednesday, January 4, 2023.            Sincerely,        IESHA Lane    CC: No Recipients

## 2023-04-17 ENCOUNTER — TELEPHONE (OUTPATIENT)
Dept: ORTHOPEDIC SURGERY | Facility: CLINIC | Age: 28
End: 2023-04-17
Payer: COMMERCIAL

## 2023-04-17 NOTE — TELEPHONE ENCOUNTER
DX Closed fracture of fifth metatarsal bone of right foot, physeal involvement unspecified, initial encounter - XRAY 4/16/23 - PN 4/16/23 [facility] -ESTABLISHED WITH DR MELENDEZ,  BEEN ON CALL - [facility]D REV

## 2023-04-20 ENCOUNTER — OFFICE VISIT (OUTPATIENT)
Dept: ORTHOPEDIC SURGERY | Facility: CLINIC | Age: 28
End: 2023-04-20
Payer: COMMERCIAL

## 2023-04-20 VITALS — OXYGEN SATURATION: 97 % | BODY MASS INDEX: 38.73 KG/M2 | HEIGHT: 66 IN | HEART RATE: 83 BPM | WEIGHT: 241 LBS

## 2023-04-20 DIAGNOSIS — S92.351A CLOSED DISPLACED FRACTURE OF FIFTH METATARSAL BONE OF RIGHT FOOT, INITIAL ENCOUNTER: Primary | ICD-10-CM

## 2023-04-20 NOTE — PROGRESS NOTES
"Chief Complaint  Initial Evaluation and Pain of the Right Foot     Subjective      Abbe Amos presents to [facility] for initial evaluation of the right foot.  She kicked the side of her bed on Sunday evening.  She went to  and was placed in a boot. She is here today to discuss treatment intervention.     No Known Allergies     Social History     Socioeconomic History   • Marital status: Single   Tobacco Use   • Smoking status: Every Day     Packs/day: 0.50     Years: 15.00     Pack years: 7.50     Types: Cigarettes, Electronic Cigarette   • Smokeless tobacco: Never   • Tobacco comments:     Smoked 11-20 years- INSTRUCTED NO SMOKING 24 HR PRIOR TO SURGERY    Vaping Use   • Vaping Use: Every day   • Substances: Nicotine, Flavoring   • Devices: Refillable tank, INSTRUCTED NO VAPING 24 HR PRIOR TO SURGERY    Substance and Sexual Activity   • Alcohol use: Not Currently     Comment: It's been a year since I drank and it was at a party   • Drug use: Not Currently     Types: Marijuana, Methamphetamines     Comment: Former, Meth USER- LAST USED 6-2020   • Sexual activity: Yes     Partners: Male     Birth control/protection: Condom        Review of Systems     Objective   Vital Signs:   Pulse 83   Ht 167.6 cm (66\")   Wt 109 kg (241 lb)   SpO2 97%   BMI 38.90 kg/m²       Physical Exam  Constitutional:       Appearance: Normal appearance. Patient is well-developed and normal weight.   HENT:      Head: Normocephalic.      Right Ear: Hearing and external ear normal.      Left Ear: Hearing and external ear normal.      Nose: Nose normal.   Eyes:      Conjunctiva/sclera: Conjunctivae normal.   Cardiovascular:      Rate and Rhythm: Normal rate.   Pulmonary:      Effort: Pulmonary effort is normal.      Breath sounds: No wheezing or rales.   Abdominal:      Palpations: Abdomen is soft.      Tenderness: There is no abdominal tenderness.   Musculoskeletal:      Cervical back: Normal range of " motion.   Skin:     Findings: No rash.   Neurological:      Mental Status: Patient is alert and oriented to person, place, and time.   Psychiatric:         Mood and Affect: Mood and affect normal.         Judgment: Judgment normal.       Ortho Exam      RIGHT FOOT Positive EHL, FHL, GS and TA. Sensation intact to all 5 nerves of the foot. Positive pulses. Neurovascularly intact. Calf soft, Non-tender. Plantar flexion 20, dorsiflexion 10. Stable to stress. Tender to base of the 5 th metatarsal. Intact flexion and extension of toes.       Procedures      Imaging Results (Most Recent)     None           Result Review :         XR Foot 3+ View Right    Result Date: 4/16/2023  Narrative: PROCEDURE: XR FOOT 3+ VW RIGHT  COMPARISON: None  INDICATIONS: RIGHT FOOT PAIN AFTER KICKING BED  FINDINGS:  There is an oblique fracture involving the proximal phalanx of the 5th digit.  There is mild ventral angulation of the distal fracture fragment.  The articulations remain intact without dislocation.      Impression:   1. Mildly displaced oblique fracture involving the proximal phalanx of the 5th digit.  There is no dislocation.      TITA PARTIDA MD       Electronically Signed and Approved By: TITA PARTIDA MD on 4/16/2023 at 21:40                      Assessment and Plan     Diagnoses and all orders for this visit:    1. Closed displaced fracture of fifth metatarsal bone of right foot, initial encounter (Primary)      Discussed the treatment plan with the patient. I reviewed the X-rays that were obtained 4/16/23 with the patient. Note given.  Wear boot.      Educated on risk of smoking. Discussed options for smoking cessation. and Call or return if worsening symptoms.    Follow Up     3 weeks with X ray.       Patient was given instructions and counseling regarding her condition or for health maintenance advice. Please see specific information pulled into the AVS if appropriate.     Scribed for Lexa Walker MD by Ary  ROSENDA Ruiz.  04/20/23   09:58 EDT    I have personally performed the services described in this document as scribed by the above individual and it is both accurate and complete. Lexa Walker MD 04/20/23

## 2023-05-08 ENCOUNTER — APPOINTMENT (OUTPATIENT)
Dept: GENERAL RADIOLOGY | Facility: HOSPITAL | Age: 28
End: 2023-05-08
Payer: COMMERCIAL

## 2023-05-08 ENCOUNTER — HOSPITAL ENCOUNTER (EMERGENCY)
Facility: HOSPITAL | Age: 28
Discharge: HOME | End: 2023-05-08
Attending: EMERGENCY MEDICINE | Admitting: EMERGENCY MEDICINE
Payer: COMMERCIAL

## 2023-05-08 VITALS
OXYGEN SATURATION: 96 % | WEIGHT: 237.44 LBS | RESPIRATION RATE: 20 BRPM | BODY MASS INDEX: 38.16 KG/M2 | HEART RATE: 76 BPM | SYSTOLIC BLOOD PRESSURE: 133 MMHG | DIASTOLIC BLOOD PRESSURE: 80 MMHG | HEIGHT: 66 IN | TEMPERATURE: 98.7 F

## 2023-05-08 DIAGNOSIS — J30.1 SEASONAL ALLERGIC RHINITIS DUE TO POLLEN: ICD-10-CM

## 2023-05-08 DIAGNOSIS — R42 VERTIGO: Primary | ICD-10-CM

## 2023-05-08 DIAGNOSIS — R11.2 NAUSEA AND VOMITING, UNSPECIFIED VOMITING TYPE: ICD-10-CM

## 2023-05-08 LAB
ALBUMIN SERPL-MCNC: 3.9 G/DL (ref 3.5–5.2)
ALBUMIN/GLOB SERPL: 1.1 G/DL
ALP SERPL-CCNC: 85 U/L (ref 39–117)
ALT SERPL W P-5'-P-CCNC: 36 U/L (ref 1–33)
ANION GAP SERPL CALCULATED.3IONS-SCNC: 9.6 MMOL/L (ref 5–15)
AST SERPL-CCNC: 29 U/L (ref 1–32)
BASOPHILS # BLD AUTO: 0.04 10*3/MM3 (ref 0–0.2)
BASOPHILS NFR BLD AUTO: 0.6 % (ref 0–1.5)
BILIRUB SERPL-MCNC: 0.3 MG/DL (ref 0–1.2)
BILIRUB UR QL STRIP: NEGATIVE
BUN SERPL-MCNC: 7 MG/DL (ref 6–20)
BUN/CREAT SERPL: 8.1 (ref 7–25)
CALCIUM SPEC-SCNC: 9.1 MG/DL (ref 8.6–10.5)
CHLORIDE SERPL-SCNC: 105 MMOL/L (ref 98–107)
CLARITY UR: CLEAR
CO2 SERPL-SCNC: 25.4 MMOL/L (ref 22–29)
COLOR UR: YELLOW
CREAT SERPL-MCNC: 0.86 MG/DL (ref 0.57–1)
DEPRECATED RDW RBC AUTO: 43.4 FL (ref 37–54)
EGFRCR SERPLBLD CKD-EPI 2021: 93.9 ML/MIN/1.73
EOSINOPHIL # BLD AUTO: 0.04 10*3/MM3 (ref 0–0.4)
EOSINOPHIL NFR BLD AUTO: 0.6 % (ref 0.3–6.2)
ERYTHROCYTE [DISTWIDTH] IN BLOOD BY AUTOMATED COUNT: 13.2 % (ref 12.3–15.4)
GLOBULIN UR ELPH-MCNC: 3.6 GM/DL
GLUCOSE SERPL-MCNC: 105 MG/DL (ref 65–99)
GLUCOSE UR STRIP-MCNC: NEGATIVE MG/DL
HCG INTACT+B SERPL-ACNC: <0.5 MIU/ML
HCT VFR BLD AUTO: 36.3 % (ref 34–46.6)
HGB BLD-MCNC: 12.3 G/DL (ref 12–15.9)
HGB UR QL STRIP.AUTO: NEGATIVE
HOLD SPECIMEN: NORMAL
HOLD SPECIMEN: NORMAL
IMM GRANULOCYTES # BLD AUTO: 0.03 10*3/MM3 (ref 0–0.05)
IMM GRANULOCYTES NFR BLD AUTO: 0.5 % (ref 0–0.5)
KETONES UR QL STRIP: NEGATIVE
LEUKOCYTE ESTERASE UR QL STRIP.AUTO: NEGATIVE
LYMPHOCYTES # BLD AUTO: 2.66 10*3/MM3 (ref 0.7–3.1)
LYMPHOCYTES NFR BLD AUTO: 40.6 % (ref 19.6–45.3)
MAGNESIUM SERPL-MCNC: 1.9 MG/DL (ref 1.6–2.6)
MCH RBC QN AUTO: 30.4 PG (ref 26.6–33)
MCHC RBC AUTO-ENTMCNC: 33.9 G/DL (ref 31.5–35.7)
MCV RBC AUTO: 89.9 FL (ref 79–97)
MONOCYTES # BLD AUTO: 0.58 10*3/MM3 (ref 0.1–0.9)
MONOCYTES NFR BLD AUTO: 8.9 % (ref 5–12)
NEUTROPHILS NFR BLD AUTO: 3.2 10*3/MM3 (ref 1.7–7)
NEUTROPHILS NFR BLD AUTO: 48.8 % (ref 42.7–76)
NITRITE UR QL STRIP: NEGATIVE
NRBC BLD AUTO-RTO: 0 /100 WBC (ref 0–0.2)
PH UR STRIP.AUTO: 7.5 [PH] (ref 5–8)
PLATELET # BLD AUTO: 179 10*3/MM3 (ref 140–450)
PMV BLD AUTO: 11.6 FL (ref 6–12)
POTASSIUM SERPL-SCNC: 3.9 MMOL/L (ref 3.5–5.2)
PROT SERPL-MCNC: 7.5 G/DL (ref 6–8.5)
PROT UR QL STRIP: NEGATIVE
RBC # BLD AUTO: 4.04 10*6/MM3 (ref 3.77–5.28)
SODIUM SERPL-SCNC: 140 MMOL/L (ref 136–145)
SP GR UR STRIP: 1.02 (ref 1–1.03)
TROPONIN T SERPL HS-MCNC: <6 NG/L
UROBILINOGEN UR QL STRIP: NORMAL
WBC NRBC COR # BLD: 6.55 10*3/MM3 (ref 3.4–10.8)
WHOLE BLOOD HOLD COAG: NORMAL
WHOLE BLOOD HOLD SPECIMEN: NORMAL

## 2023-05-08 PROCEDURE — 84702 CHORIONIC GONADOTROPIN TEST: CPT

## 2023-05-08 PROCEDURE — 36415 COLL VENOUS BLD VENIPUNCTURE: CPT

## 2023-05-08 PROCEDURE — 99284 EMERGENCY DEPT VISIT MOD MDM: CPT

## 2023-05-08 PROCEDURE — 81003 URINALYSIS AUTO W/O SCOPE: CPT

## 2023-05-08 PROCEDURE — 85025 COMPLETE CBC W/AUTO DIFF WBC: CPT

## 2023-05-08 PROCEDURE — 80053 COMPREHEN METABOLIC PANEL: CPT

## 2023-05-08 PROCEDURE — 83735 ASSAY OF MAGNESIUM: CPT

## 2023-05-08 PROCEDURE — 93005 ELECTROCARDIOGRAM TRACING: CPT

## 2023-05-08 PROCEDURE — 84484 ASSAY OF TROPONIN QUANT: CPT

## 2023-05-08 PROCEDURE — 93005 ELECTROCARDIOGRAM TRACING: CPT | Performed by: EMERGENCY MEDICINE

## 2023-05-08 PROCEDURE — 71045 X-RAY EXAM CHEST 1 VIEW: CPT

## 2023-05-08 RX ORDER — SODIUM CHLORIDE 0.9 % (FLUSH) 0.9 %
10 SYRINGE (ML) INJECTION AS NEEDED
Status: DISCONTINUED | OUTPATIENT
Start: 2023-05-08 | End: 2023-05-08 | Stop reason: HOSPADM

## 2023-05-08 RX ORDER — MECLIZINE HYDROCHLORIDE 25 MG/1
25 TABLET ORAL 3 TIMES DAILY PRN
Qty: 15 TABLET | Refills: 0 | Status: SHIPPED | OUTPATIENT
Start: 2023-05-08

## 2023-05-08 RX ORDER — MECLIZINE HYDROCHLORIDE 25 MG/1
25 TABLET ORAL ONCE
Status: COMPLETED | OUTPATIENT
Start: 2023-05-08 | End: 2023-05-08

## 2023-05-08 RX ORDER — CETIRIZINE HYDROCHLORIDE 10 MG/1
10 TABLET ORAL DAILY
Qty: 20 TABLET | Refills: 0 | Status: SHIPPED | OUTPATIENT
Start: 2023-05-08 | End: 2023-05-28

## 2023-05-08 RX ADMIN — MECLIZINE HYDROCHLORIDE 25 MG: 25 TABLET ORAL at 20:27

## 2023-05-08 NOTE — ED PROVIDER NOTES
"Time: 7:46 PM EDT  Date of encounter:  5/8/2023  Independent Historian/Clinical History and Information was obtained by:   Patient  Chief Complaint: DIZZINESS    History is limited by: N/A    History of Present Illness:    The patient presents to the emergency department complaining of intermittent dizziness that she states that this started about a week ago.  She states that she has had no chest pain, no shortness of breath, no hemoptysis.  She denies any leg or calf pain.  She does complain of a headache and states she has had increased allergy symptoms.  She denies any recent fevers.  She states that she did have some nausea and vomiting last Thursday but none since then.  She states that she feels \"like the room is spinning\".  Patient is able to stand and ambulate without any difficulties.  She is not actively vomiting on exam.  She has a grossly intact neuro exam.  She denies any neck pain or tenderness with palpation.      History provided by:  Patient   used: No        Patient Care Team  Primary Care Provider: Babs Muñoz PA-C    Past Medical History:     No Known Allergies  Past Medical History:   Diagnosis Date   • Ankle sprain 08/09/2022    Left ankle   • Anxiety    • Chemical dependency 06/2019   • Depression    • Fracture of wrist 08/18/2020    Right hand   • Hepatitis     HEP C- NO CURRENT PROBLEMS REPORTED   • History of methamphetamine abuse 06/11/2020    Cont avoidance of drug use, especially now since pt is pregnant   • History of self-harm 06/11/2020   • Hydradenitis    • Hyperlipemia    • Nicotine dependence    • Night sweats    • Pregnancy    • Sebaceous cyst of left axilla    • Shortness of breath     NO CURRENT PROBLEMS    • STD (sexually transmitted disease)    • Suicidal ideations 06/11/2020    Discussed suicidal thoughts. PT signed safety contract today. If thoughts return pt needs to call 911 or go immediately to ER for treatment. We will refer asap to Psychiatry " for counseling. Discussed safety plan with pt. Discussed need to lock up all weapons and Meds in the home. Encouraged Pt to find things pt enjoys. Discussed different med options and side effects. RTC for re-evaluation.      Past Surgical History:   Procedure Laterality Date   • EXCISION LESION Left 11/26/2021    Procedure: EXCISION CYST left axilla;  Surgeon: Serg Sterling MD;  Location: [facility] MAIN OR;  Service: General;  Laterality: Left;   • PILONIDAL CYST DRAINAGE      at age 11     Family History   Problem Relation Age of Onset   • Thyroid cancer Mother         Gland, Malignant   • Cancer Mother         Thyroid   • Malig Hyperthermia Neg Hx        Home Medications:  Prior to Admission medications    Medication Sig Start Date End Date Taking? Authorizing Provider   buPROPion XL (WELLBUTRIN XL) 300 MG 24 hr tablet Take 1 tablet by mouth Daily. 5/28/21   Pritesh Rajput MD   busPIRone (BUSPAR) 10 MG tablet Take 2 tablets by mouth 3 (Three) Times a Day. 4/13/21   Pritesh Rajput MD   glycopyrrolate (ROBINUL) 1 MG tablet Take 1 tablet by mouth every night at bedtime. 5/22/21   Pritesh Rajput MD   ibuprofen (ADVIL,MOTRIN) 800 MG tablet Take 1 tablet by mouth Every 6 (Six) Hours As Needed for Mild Pain. 4/16/23   Bharti Dodd APRN   OXcarbazepine (TRILEPTAL) 150 MG tablet TAKE 1 TABLET BY MOUTH ONCE DAILY FOR 7 DAYS, THEN INCREASE TO TWICE DAILY THEREAFTER 12/13/21   Pritesh Rajptu MD   prazosin (MINIPRESS) 2 MG capsule Take 1 capsule by mouth every night at bedtime. 10/3/22   Pritesh Rajput MD   sertraline (ZOLOFT) 100 MG tablet Take 2 tablets by mouth Daily. 4/28/21   Pritesh Rajput MD        Social History:   Social History     Tobacco Use   • Smoking status: Every Day     Packs/day: 0.50     Years: 15.00     Pack years: 7.50     Types: Cigarettes, Electronic Cigarette   • Smokeless tobacco: Never   • Tobacco comments:     Smoked 11-20 years- INSTRUCTED NO  "SMOKING 24 HR PRIOR TO SURGERY    Vaping Use   • Vaping Use: Every day   • Substances: Nicotine, Flavoring   • Devices: Refillable tank, INSTRUCTED NO VAPING 24 HR PRIOR TO SURGERY    Substance Use Topics   • Alcohol use: Not Currently     Comment: It's been a year since I drank and it was at a party   • Drug use: Not Currently     Types: Marijuana, Methamphetamines     Comment: Former, Meth USER- LAST USED 6-2020         Review of Systems:  Review of Systems   Constitutional: Negative for chills and fever.   HENT: Positive for congestion and rhinorrhea. Negative for ear pain and sore throat.    Eyes: Negative for pain.   Respiratory: Negative for cough, chest tightness and shortness of breath.    Cardiovascular: Negative for chest pain.   Gastrointestinal: Positive for nausea and vomiting. Negative for abdominal pain and diarrhea.   Genitourinary: Negative for dysuria, flank pain, frequency, hematuria and urgency.   Musculoskeletal: Negative for back pain, joint swelling, neck pain and neck stiffness.   Skin: Negative for pallor and rash.   Neurological: Positive for dizziness and headaches. Negative for seizures, syncope, speech difficulty, weakness and numbness.   All other systems reviewed and are negative.       Physical Exam:  /80   Pulse 76   Temp 98.7 °F (37.1 °C) (Oral)   Resp 20   Ht 167.6 cm (66\")   Wt 108 kg (237 lb 7 oz)   LMP 04/22/2023 (Exact Date)   SpO2 96%   BMI 38.32 kg/m²     Physical Exam  Vitals and nursing note reviewed.   Constitutional:       General: She is not in acute distress.     Appearance: Normal appearance. She is not ill-appearing or toxic-appearing.   HENT:      Head: Normocephalic and atraumatic.      Right Ear: Tympanic membrane, ear canal and external ear normal.      Left Ear: Tympanic membrane, ear canal and external ear normal.      Nose: Congestion and rhinorrhea present.      Mouth/Throat:      Mouth: Mucous membranes are moist.      Pharynx: Oropharynx is " clear.   Eyes:      General: No scleral icterus.     Conjunctiva/sclera: Conjunctivae normal.      Pupils: Pupils are equal, round, and reactive to light.   Cardiovascular:      Rate and Rhythm: Normal rate and regular rhythm.      Pulses: Normal pulses.   Pulmonary:      Effort: Pulmonary effort is normal. No respiratory distress.      Breath sounds: Normal breath sounds. No wheezing.   Abdominal:      General: Abdomen is flat.      Palpations: Abdomen is soft.      Tenderness: There is no abdominal tenderness. There is no guarding or rebound.   Musculoskeletal:         General: No swelling or tenderness. Normal range of motion.      Cervical back: Normal range of motion and neck supple. No rigidity or tenderness.      Right lower leg: No edema.      Left lower leg: No edema.   Lymphadenopathy:      Cervical: No cervical adenopathy.   Skin:     General: Skin is warm and dry.      Capillary Refill: Capillary refill takes less than 2 seconds.      Findings: No rash.   Neurological:      General: No focal deficit present.      Mental Status: She is alert and oriented to person, place, and time. Mental status is at baseline.   Psychiatric:         Mood and Affect: Mood normal.         Behavior: Behavior normal.                  Procedures:  Procedures      Medical Decision Making:      Comorbidities that affect care:    Anxiety, depression, history of methamphetamine use, STD, suicidal ideations, subcutaneous abscess, hepatitis, history of self-harm, hidradenitis, Coronary Artery Disease, Smoking, Substance Abuse    External Notes reviewed:    None      The following orders were placed and all results were independently analyzed by me:  Orders Placed This Encounter   Procedures   • XR Chest 1 View   • Ferdinand Draw   • Comprehensive Metabolic Panel   • Single High Sensitivity Troponin T   • Magnesium   • hCG, Quantitative, Pregnancy   • Urinalysis With Microscopic If Indicated (No Culture) - Urine, Clean Catch   • CBC  Auto Differential   • Undress & Gown   • Continuous Pulse Oximetry   • Vital Signs   • Orthostatic Blood Pressure   • Orthostatic Vitals   • ECG 12 Lead ED Triage Standing Order; Weak / Dizzy / AMS   • CBC & Differential   • Green Top (Gel)   • Lavender Top   • Gold Top - SST   • Light Blue Top       Medications Given in the Emergency Department:  Medications   meclizine (ANTIVERT) tablet 25 mg (25 mg Oral Given 5/8/23 2027)        ED Course:         Labs:    Lab Results (last 24 hours)     Procedure Component Value Units Date/Time    CBC & Differential [[id number]]  (Normal) Collected: 05/08/23 1807    Specimen: Blood from Arm, Left Updated: 05/08/23 1820    Narrative:      The following orders were created for panel order CBC & Differential.  Procedure                               Abnormality         Status                     ---------                               -----------         ------                     CBC Auto Differential[[id number]]        Normal              Final result                 Please view results for these tests on the individual orders.    Comprehensive Metabolic Panel [[id number]]  (Abnormal) Collected: 05/08/23 1807    Specimen: Blood from Arm, Left Updated: 05/08/23 1855     Glucose 105 mg/dL      BUN 7 mg/dL      Creatinine 0.86 mg/dL      Sodium 140 mmol/L      Potassium 3.9 mmol/L      Chloride 105 mmol/L      CO2 25.4 mmol/L      Calcium 9.1 mg/dL      Total Protein 7.5 g/dL      Albumin 3.9 g/dL      ALT (SGPT) 36 U/L      AST (SGOT) 29 U/L      Alkaline Phosphatase 85 U/L      Total Bilirubin 0.3 mg/dL      Globulin 3.6 gm/dL      A/G Ratio 1.1 g/dL      BUN/Creatinine Ratio 8.1     Anion Gap 9.6 mmol/L      eGFR 93.9 mL/min/1.73     Narrative:      GFR Normal >60  Chronic Kidney Disease <60  Kidney Failure <15      Single High Sensitivity Troponin T [[id number]]  (Normal) Collected: 05/08/23 1807    Specimen: Blood from Arm, Left Updated: 05/08/23 1855     HS Troponin T <6 ng/L      Narrative:      High Sensitive Troponin T Reference Range:  <10.0 ng/L- Negative Female for AMI  <15.0 ng/L- Negative Male for AMI  >=10 - Abnormal Female indicating possible myocardial injury.  >=15 - Abnormal Male indicating possible myocardial injury.   Clinicians would have to utilize clinical acumen, EKG, Troponin, and serial changes to determine if it is an Acute Myocardial Infarction or myocardial injury due to an underlying chronic condition.         Magnesium [[id number]]  (Normal) Collected: 05/08/23 1807    Specimen: Blood from Arm, Left Updated: 05/08/23 1855     Magnesium 1.9 mg/dL     hCG, Quantitative, Pregnancy [[id number]] Collected: 05/08/23 1807    Specimen: Blood from Arm, Left Updated: 05/08/23 1901     HCG Quantitative <0.50 mIU/mL     Narrative:      HCG Ranges by Gestational Age    Females - non-pregnant premenopausal   </= 1mIU/mL HCG  Females - postmenopausal               </= 7mIU/mL HCG    3 Weeks       5.4   -      72 mIU/mL  4 Weeks      10.2   -     708 mIU/mL  5 Weeks       217   -   8,245 mIU/mL  6 Weeks       152   -  32,177 mIU/mL  7 Weeks     4,059   - 153,767 mIU/mL  8 Weeks    31,366   - 149,094 mIU/mL  9 Weeks    59,109   - 135,901 mIU/mL  10 Weeks   44,186   - 170,409 mIU/mL  12 Weeks   27,107   - 201,615 mIU/mL  14 Weeks   24,302   -  93,646 mIU/mL  15 Weeks   12,540   -  69,747 mIU/mL  16 Weeks    8,904   -  55,332 mIU/mL  17 Weeks    8,240   -  51,793 mIU/mL  18 Weeks    9,649   -  55,271 mIU/mL      CBC Auto Differential [[id number]]  (Normal) Collected: 05/08/23 1807    Specimen: Blood from Arm, Left Updated: 05/08/23 1820     WBC 6.55 10*3/mm3      RBC 4.04 10*6/mm3      Hemoglobin 12.3 g/dL      Hematocrit 36.3 %      MCV 89.9 fL      MCH 30.4 pg      MCHC 33.9 g/dL      RDW 13.2 %      RDW-SD 43.4 fl      MPV 11.6 fL      Platelets 179 10*3/mm3      Neutrophil % 48.8 %      Lymphocyte % 40.6 %      Monocyte % 8.9 %      Eosinophil % 0.6 %      Basophil % 0.6 %       Immature Grans % 0.5 %      Neutrophils, Absolute 3.20 10*3/mm3      Lymphocytes, Absolute 2.66 10*3/mm3      Monocytes, Absolute 0.58 10*3/mm3      Eosinophils, Absolute 0.04 10*3/mm3      Basophils, Absolute 0.04 10*3/mm3      Immature Grans, Absolute 0.03 10*3/mm3      nRBC 0.0 /100 WBC     Urinalysis With Microscopic If Indicated (No Culture) - Urine, Clean Catch [[id number]]  (Normal) Collected: 05/08/23 1847    Specimen: Urine, Clean Catch Updated: 05/08/23 1856     Color, UA Yellow     Appearance, UA Clear     pH, UA 7.5     Specific Gravity, UA 1.025     Glucose, UA Negative     Ketones, UA Negative     Bilirubin, UA Negative     Blood, UA Negative     Protein, UA Negative     Leuk Esterase, UA Negative     Nitrite, UA Negative     Urobilinogen, UA 1.0 E.U./dL    Narrative:      Urine microscopic not indicated.           Imaging:    XR Chest 1 View    Result Date: 5/8/2023  PROCEDURE: XR CHEST 1 VW  COMPARISON: None  INDICATIONS: DIZZINESS STARTING ON 5/4/2023  FINDINGS:  Poor inspiratory effort.  Heart size and pulmonary vessels likely normal when accounting for low lung volumes.  Lungs clear.  Costophrenic angles sharp       No active cardiopulmonary disease       USHA DEVINE MD       Electronically Signed and Approved By: USHA DEVINE MD on 5/08/2023 at 19:39                 Differential Diagnosis and Discussion:    Dizziness: Based on the patient's history, signs, and symptoms, the diffential diagnosis includes but is not limited to meningitis, stroke, sepsis, subarachnoid hemorrhage, intracranial bleeding, encephalitis, vertigo, electrolyte imbalance, and metabolic disorders.    All labs were reviewed and interpreted by me.  EKG was interpreted by supervising attending.    MDM  Number of Diagnoses or Management Options  Nausea and vomiting, unspecified vomiting type: minor  Seasonal allergic rhinitis due to pollen: minor  Vertigo: new and requires workup     Amount and/or Complexity of Data  Reviewed  Clinical lab tests: reviewed  Tests in the radiology section of CPT®: reviewed  Tests in the medicine section of CPT®: reviewed    Risk of Complications, Morbidity, and/or Mortality  Presenting problems: low  Diagnostic procedures: low  Management options: low    Patient Progress  Patient progress: stable       Patient Care Considerations:    CT HEAD: I considered ordering a noncontrast CT of the head, however Patient declined stating she had been here long enough.      Consultants/Shared Management Plan:    None    Social Determinants of Health:    Patient is independent, reliable, and has access to care.       Disposition and Care Coordination:    Discharged: The patient is suitable and stable for discharge with no need for consideration of observation or admission.    I have explained the patient´s condition, diagnoses and treatment plan based on the information available to me at this time. I have answered questions and addressed any concerns. The patient has a good  understanding of the patient´s diagnosis, condition, and treatment plan as can be expected at this point. The vital signs have been stable. The patient´s condition is stable and appropriate for discharge from the emergency department.      The patient will pursue further outpatient evaluation with the primary care physician or other designated or consulting physician as outlined in the discharge instructions. They are agreeable to this plan of care and follow-up instructions have been explained in detail. The patient has received these instructions in written format and have expressed an understanding of the discharge instructions. The patient is aware that any significant change in condition or worsening of symptoms should prompt an immediate return to this or the closest emergency department or call to 911.  I have explained discharge medications and the need for follow up with the patient/caretakers. This was also printed in the  discharge instructions. Patient was discharged with the following medications and follow up:      Medication List      New Prescriptions    cetirizine 10 MG tablet  Commonly known as: zyrTEC  Take 1 tablet by mouth Daily for 20 days.     meclizine 25 MG tablet  Commonly known as: ANTIVERT  Take 1 tablet by mouth 3 (Three) Times a Day As Needed for Dizziness.           Where to Get Your Medications      These medications were sent to [facility]/pharmacy #[id number] - [place], [place] - [address] - [phone] [facility] [phone] FX  [address] [place] [address]    Hours: 24-hours Phone: [phone]   · cetirizine 10 MG tablet  · meclizine 25 MG tablet      Babs Muñoz PA-C  [address] [address] [phone]    Call   FOR FOLLOW UP       Final diagnoses:   Vertigo   Seasonal allergic rhinitis due to pollen   Nausea and vomiting, unspecified vomiting type        ED Disposition     ED Disposition   Discharge    Condition   Stable    Comment   --             This medical record created using voice recognition software.           Lupe Pahceco, APRN  05/09/23 0651

## 2023-05-08 NOTE — Clinical Note
[facility] EMERGENCY ROOM  [address]  [place] [address]  Phone: [phone]    Abbe Amos was seen and treated in our emergency department on 5/8/2023.  She may return to work on 05/10/2023.         Thank you for choosing [facility].    Lupe Pacheco APRN

## 2023-05-09 LAB — QT INTERVAL: 389 MS

## 2023-05-09 NOTE — DISCHARGE INSTRUCTIONS
Rest, drink plenty of fluids.  Take your meds as prescribed.  You may take over-the-counter acetaminophen and Motrin as needed for aches pains and fever.  Use caution when changing positions from lying to sitting and sitting to standing.  Use caution when driving a car or doing any type of activities.  Follow-up with your primary care provider in 1 to 2 days for reevaluation and further treatment as necessary.  Return to the emergency department for any acutely developing neurological symptoms, any altered mental status, any persistent vomiting or any new or worse concerns..

## 2023-05-09 NOTE — ED NOTES
Received DC orders, printed papers, patient removed own IV. Reviewed instructions, paperwork signed.

## 2023-05-19 ENCOUNTER — OFFICE VISIT (OUTPATIENT)
Dept: ORTHOPEDIC SURGERY | Facility: CLINIC | Age: 28
End: 2023-05-19
Payer: COMMERCIAL

## 2023-05-19 VITALS — OXYGEN SATURATION: 99 % | BODY MASS INDEX: 38.09 KG/M2 | WEIGHT: 237 LBS | HEIGHT: 66 IN | HEART RATE: 101 BPM

## 2023-05-19 DIAGNOSIS — M79.671 RIGHT FOOT PAIN: Primary | ICD-10-CM

## 2023-05-19 DIAGNOSIS — S92.351D CLOSED DISPLACED FRACTURE OF FIFTH METATARSAL BONE OF RIGHT FOOT WITH ROUTINE HEALING, SUBSEQUENT ENCOUNTER: ICD-10-CM

## 2023-05-19 NOTE — PATIENT INSTRUCTIONS
X-rays taken and reviewed with patient showing stable fracture with good bone healing.  Patient to continue being full weightbearing and to wear whatever shoes are comfortable for her.  Educated that if it causes her pain to return back to the walking boot.    Follow-up in 4 weeks for repeat x-rays.  Call with questions concerns or worsening symptoms.

## 2023-05-19 NOTE — PROGRESS NOTES
"Chief Complaint  Follow-up of the Right Foot    Subjective      Abbe Amos presents to [facility] for 4-week follow-up of closed displaced fracture of the fifth metatarsal bone of the right foot.  She was initially evaluated on 4/20/2023 by Dr. Walker where she was to continue wearing a walking boot.  Patient reports today wearing slip on shoes and states that she is doing well and has no pain.  She has been walking ambulatory with nonantalgic gait and full weightbearing on the foot.    Objective   No Known Allergies    Vital Signs:   Pulse 101   Ht 167.6 cm (66\")   Wt 108 kg (237 lb)   SpO2 99%   BMI 38.25 kg/m²       Physical Exam    Constitutional: Awake, alert. Well nourished appearance.    Integumentary: Warm, dry, intact. No obvious rashes.    HENT: Atraumatic, normocephalic.   Respiratory: Non labored respirations .   Cardiovascular: Intact peripheral pulses.    Psychiatric: Normal mood and affect. A&O X3    Ortho Exam  Right foot: Full range of motion to plantar flexion and dorsiflexion of the foot.  Can wiggle all of her toes.  Has full sensation to all of her toes.  Nontender palpation of fracture site.  Peripheral pulses are intact.  Capillary refill time is brisk.    Imaging Results (Most Recent)     Procedure Component Value Units Date/Time    XR Foot 2 View Right [[id number]] Resulted: 05/19/23 0934     Updated: 05/19/23 0935    Narrative:      X-Ray Report:  Study: X-rays ordered, taken in the office, and reviewed today.   Site: Right xray  Indication: Right fifth metatarsal fracture  View: AP/Lateral view(s)  Findings: Right fifth metatarsal fracture with good alignment and adequate   bone healing and callus formation.  Prior studies available for comparison: yes                       Assessment and Plan   Problem List Items Addressed This Visit    None  Visit Diagnoses     Right foot pain    -  Primary    Relevant Orders    XR Foot 2 View Right (Completed)    Closed " displaced fracture of fifth metatarsal bone of right foot with routine healing, subsequent encounter              Follow Up   Return in about 4 weeks (around 6/16/2023).    Patient is a non-smoker, did not discuss options for smoking cessation.     Social History     Socioeconomic History   • Marital status: Single   Tobacco Use   • Smoking status: Every Day     Packs/day: 0.50     Years: 15.00     Pack years: 7.50     Types: Cigarettes, Electronic Cigarette   • Smokeless tobacco: Never   • Tobacco comments:     Smoked 11-20 years- INSTRUCTED NO SMOKING 24 HR PRIOR TO SURGERY    Vaping Use   • Vaping Use: Every day   • Substances: Nicotine, Flavoring   • Devices: [employer] tank, INSTRUCTED NO VAPING 24 HR PRIOR TO SURGERY    Substance and Sexual Activity   • Alcohol use: Not Currently     Comment: It's been a year since I drank and it was at a party   • Drug use: Not Currently     Types: Marijuana, Methamphetamines     Comment: Former, Meth USER- LAST USED 6-2020   • Sexual activity: Yes     Partners: Male     Birth control/protection: None       Patient Instructions   X-rays taken and reviewed with patient showing stable fracture with good bone healing.  Patient to continue being full weightbearing and to wear whatever shoes are comfortable for her.  Educated that if it causes her pain to return back to the walking boot.    Follow-up in 4 weeks for repeat x-rays.  Call with questions concerns or worsening symptoms.    Patient was given instructions and counseling regarding her condition or for health maintenance advice. Please see specific information pulled into the AVS if appropriate.

## 2023-08-03 ENCOUNTER — TELEPHONE (OUTPATIENT)
Dept: GASTROENTEROLOGY | Facility: CLINIC | Age: 28
End: 2023-08-03
Payer: COMMERCIAL

## 2023-08-31 ENCOUNTER — OFFICE VISIT (OUTPATIENT)
Dept: INTERNAL MEDICINE | Facility: CLINIC | Age: 28
End: 2023-08-31
Payer: COMMERCIAL

## 2023-08-31 VITALS
SYSTOLIC BLOOD PRESSURE: 136 MMHG | HEART RATE: 118 BPM | DIASTOLIC BLOOD PRESSURE: 68 MMHG | BODY MASS INDEX: 36.07 KG/M2 | OXYGEN SATURATION: 98 % | HEIGHT: 68 IN | WEIGHT: 238 LBS | TEMPERATURE: 97.8 F | RESPIRATION RATE: 16 BRPM

## 2023-08-31 DIAGNOSIS — Z00.00 ANNUAL PHYSICAL EXAM: Primary | ICD-10-CM

## 2023-08-31 DIAGNOSIS — N92.6 IRREGULAR MENSES: ICD-10-CM

## 2023-08-31 DIAGNOSIS — F33.1 MAJOR DEPRESSIVE DISORDER, RECURRENT EPISODE, MODERATE DEGREE: ICD-10-CM

## 2023-08-31 DIAGNOSIS — R03.0 ELEVATED BLOOD PRESSURE READING WITHOUT DIAGNOSIS OF HYPERTENSION: ICD-10-CM

## 2023-08-31 DIAGNOSIS — Z12.4 SCREENING FOR CERVICAL CANCER: ICD-10-CM

## 2023-08-31 LAB
B-HCG UR QL: NEGATIVE
EXPIRATION DATE: NORMAL
HCG INTACT+B SERPL-ACNC: <1 MIU/ML
INTERNAL NEGATIVE CONTROL: NORMAL
INTERNAL POSITIVE CONTROL: NORMAL
Lab: NORMAL

## 2023-08-31 PROCEDURE — 84702 CHORIONIC GONADOTROPIN TEST: CPT | Performed by: PHYSICIAN ASSISTANT

## 2023-08-31 PROCEDURE — 36415 COLL VENOUS BLD VENIPUNCTURE: CPT | Performed by: PHYSICIAN ASSISTANT

## 2023-08-31 NOTE — PROGRESS NOTES
Chief Complaint  Annual Exam and Menstrual Problem    Subjective          Abbe Amos presents to [facility]  History of Present Illness  Annual physical  Only concern was recent abnormal period  LMP 8/15. Lasted 6 days. Then bleeding restarted 8/26-29.  Before this month, periods are usually once/month. Sx usually last 5-6 days.  Denies vaginal discharge  Pt is currently sexually active without protection  Not trying to prevent pregnancy.   Denies abd pain, NVDC  Depression: seeing psych, mood has been doing well  Denies si/hi    Past Medical History:   Diagnosis Date    Ankle sprain 08/09/2022    Left ankle    Anxiety     Chemical dependency 06/2019    Depression     Fracture of toe of right foot     5th    Fracture of wrist 08/18/2020    Right hand    Hepatitis     HEP C- NO CURRENT PROBLEMS REPORTED    History of methamphetamine abuse 06/11/2020    Cont avoidance of drug use, especially now since pt is pregnant    History of self-harm 06/11/2020    Hydradenitis     Hyperlipemia     Nicotine dependence     Night sweats     Pregnancy     Sebaceous cyst of left axilla     Shortness of breath     NO CURRENT PROBLEMS     STD (sexually transmitted disease)     Suicidal ideations 06/11/2020    Discussed suicidal thoughts. PT signed safety contract today. If thoughts return pt needs to call 911 or go immediately to ER for treatment. We will refer asap to Psychiatry for counseling. Discussed safety plan with pt. Discussed need to lock up all weapons and Meds in the home. Encouraged Pt to find things pt enjoys. Discussed different med options and side effects. RTC for re-evaluation.         Past Surgical History:   Procedure Laterality Date    EXCISION LESION Left 11/26/2021    Procedure: EXCISION CYST left axilla;  Surgeon: Serg Sterling MD;  Location: [facility] MAIN OR;  Service: General;  Laterality: Left;    PILONIDAL CYST DRAINAGE      at age 11        Current  "Outpatient Medications on File Prior to Visit   Medication Sig Dispense Refill    buPROPion XL (WELLBUTRIN XL) 300 MG 24 hr tablet Take 1 tablet by mouth Daily.      busPIRone (BUSPAR) 10 MG tablet Take 2 tablets by mouth 3 (Three) Times a Day.      glycopyrrolate (ROBINUL) 1 MG tablet Take 1 tablet by mouth every night at bedtime.      ibuprofen (ADVIL,MOTRIN) 800 MG tablet Take 1 tablet by mouth Every 6 (Six) Hours As Needed for Mild Pain. 40 tablet 0    OXcarbazepine (TRILEPTAL) 150 MG tablet TAKE 1 TABLET BY MOUTH ONCE DAILY FOR 7 DAYS, THEN INCREASE TO TWICE DAILY THEREAFTER      prazosin (MINIPRESS) 2 MG capsule Take 1 capsule by mouth every night at bedtime.      sertraline (ZOLOFT) 100 MG tablet Take 2 tablets by mouth Daily.      [DISCONTINUED] cetirizine (zyrTEC) 10 MG tablet Take 1 tablet by mouth Daily for 20 days. 20 tablet 0     No current facility-administered medications on file prior to visit.        No Known Allergies    Social History     Tobacco Use   Smoking Status Former    Packs/day: 0.50    Years: 15.00    Pack years: 7.50    Types: Cigarettes, Electronic Cigarette   Smokeless Tobacco Never   Tobacco Comments    Smoked 11-20 years- INSTRUCTED NO SMOKING 24 HR PRIOR TO SURGERY           Objective   Vital Signs:   /68 (BP Location: Right arm, Patient Position: Sitting, Cuff Size: Adult)   Pulse 118   Temp 97.8 øF (36.6 øC) (Temporal)   Resp 16   Ht 172.7 cm (68\")   Wt 108 kg (238 lb)   SpO2 98%   BMI 36.19 kg/mý     Physical Exam  Vitals reviewed.   Constitutional:       Appearance: Normal appearance.   HENT:      Head: Normocephalic and atraumatic.      Nose: Nose normal.      Mouth/Throat:      Mouth: Mucous membranes are moist.   Eyes:      Extraocular Movements: Extraocular movements intact.      Conjunctiva/sclera: Conjunctivae normal.      Pupils: Pupils are equal, round, and reactive to light.   Cardiovascular:      Rate and Rhythm: Normal rate and regular rhythm. "   Pulmonary:      Effort: Pulmonary effort is normal.      Breath sounds: Normal breath sounds.   Abdominal:      General: Abdomen is flat. Bowel sounds are normal.      Palpations: Abdomen is soft.   Musculoskeletal:         General: Normal range of motion.   Neurological:      General: No focal deficit present.      Mental Status: She is alert and oriented to person, place, and time.   Psychiatric:         Mood and Affect: Mood normal.      Result Review :            Class 2 Severe Obesity (BMI >=35 and <=39.9). Obesity-related health conditions include the following: none. Obesity is unchanged. BMI is is above average; BMI management plan is completed. We discussed portion control and increasing exercise.              Assessment and Plan    Diagnoses and all orders for this visit:    1. Annual physical exam (Primary)  Assessment & Plan:  Reviewed preventative medication recommendations that are age appropriate for the patient. Education provided for health and wellness. Encouraged healthy diet, regular exercise, and routine wellness checkups.        2. Irregular menses  Assessment & Plan:  Discussed ddx. Up negative. Will get hcg quant since pt not currently preventing pregnancy.    Orders:  -     POCT pregnancy, urine  -     HCG, B-subunit, Quantitative    3. Screening for cervical cancer  -     Ambulatory Referral to Gynecology    4. Elevated blood pressure reading without diagnosis of hypertension  Assessment & Plan:  Discussed bp elevation at today's visit. Not high enough for medication at this time. Discussed risks of blood pressure elevation including death, heart attack, stroke, kidney disease, blindness. Low salt diet, increase exercise. We will monitor at follow up and discuss blood pressure medications if necessary. To er if chest pain, palpitations, vision loss, unilateral weakness, altered mental status. Pt understands and agrees with plan.       5. Major depressive disorder, recurrent episode,  moderate degree  Assessment & Plan:  Patient's depression is recurrent and is mild without psychosis. Their depression is currently active and the condition is improving with treatment. This will be reassessed at the next regular appointment. F/U as described:patient will continue current medication therapy and follow up with psych for med mgmt and counseling.          Follow Up   Return in about 1 month (around 9/30/2023).  Patient was given instructions and counseling regarding her condition or for health maintenance advice. Please see specific information pulled into the AVS if appropriate.

## 2023-08-31 NOTE — ASSESSMENT & PLAN NOTE
Discussed bp elevation at today's visit. Not high enough for medication at this time. Discussed risks of blood pressure elevation including death, heart attack, stroke, kidney disease, blindness. Low salt diet, increase exercise. We will monitor at follow up and discuss blood pressure medications if necessary. To er if chest pain, palpitations, vision loss, unilateral weakness, altered mental status. Pt understands and agrees with plan.

## 2023-08-31 NOTE — ASSESSMENT & PLAN NOTE
Patient's depression is recurrent and is mild without psychosis. Their depression is currently active and the condition is improving with treatment. This will be reassessed at the next regular appointment. F/U as described:patient will continue current medication therapy and follow up with psych for med mgmt and counseling.

## 2023-08-31 NOTE — PROGRESS NOTES
I have reviewed the notes, assessments, and/or procedures performed by Babs Muñoz PA-C, I concur with her documentation of Abbe Amos.

## 2023-09-06 NOTE — PROGRESS NOTES
HPI:   28 y.o. . Presents for well woman exam. {Birth control or /HRT (Optional):93276}  Menses:   q *** days, lasts *** days, changes {tjmenstrual products:02250} q ***hrs on heaviest days.   Pain:  {APPAIN:22486}  Last pap: {NORMAL/ABNORMAL ONLY:59941} SCANNED - PAP SMEAR (2020)  Complaints: {IUDremoval (Optional):24573}  {Previous treatment failure: *** (Optional)}  {Urinary QUALITY measure (Optional):84809}    Past Medical History:   Diagnosis Date    Ankle sprain 2022    Left ankle    Anxiety     Chemical dependency 2019    Depression     Fracture of toe of right foot     5th    Fracture of wrist 2020    Right hand    Hepatitis     HEP C- NO CURRENT PROBLEMS REPORTED    History of methamphetamine abuse 2020    Cont avoidance of drug use, especially now since pt is pregnant    History of self-harm 2020    Hydradenitis     Hyperlipemia     Nicotine dependence     Night sweats     Pregnancy     Sebaceous cyst of left axilla     Shortness of breath     NO CURRENT PROBLEMS     STD (sexually transmitted disease)     Suicidal ideations 2020    Discussed suicidal thoughts. PT signed safety contract today. If thoughts return pt needs to call 911 or go immediately to ER for treatment. We will refer asap to Psychiatry for counseling. Discussed safety plan with pt. Discussed need to lock up all weapons and Meds in the home. Encouraged Pt to find things pt enjoys. Discussed different med options and side effects. RTC for re-evaluation.       Past Surgical History:   Procedure Laterality Date    EXCISION LESION Left 2021    Procedure: EXCISION CYST left axilla;  Surgeon: Serg Sterling MD;  [facility]: [facility] MA[facility];  Service: General;  Laterality: Left;    PILONIDAL CYST DRAINAGE      at age 11      Family History   Problem Relation Age of Onset    Thyroid cancer Mother         Gland, Malignant    Cancer Mother         Thyroid    Hypertension Mother     Nayla  Hyperthermia Neg Hx      Allergies as of 09/07/2023    (No Known Allergies)        {PCP:04030}    There were no vitals taken for this visit.    PHYSICAL EXAM: Chaperone present   General- NAD, alert and oriented, appropriate  Psych- Normal mood, good memory  Neck- No masses, no thyroid enlargement  Lymphatic- No palpable neck, axillary, or groin nodes  CV- Regular rhythm, no murmurs  Resp- CTA to bases, no wheezes  Abdomen- Soft, non distended, non tender, no masses  Breast left-  Bilaterally symmetrical, no masses, non tender, no nipple discharge  Breast right- Bilaterally symmetrical, no masses, non tender, no nipple discharge  External genitalia- Normal female, no lesions  Urethra/meatus- Normal, no masses, non tender, no prolapse  Bladder- Normal, no masses, non tender, no prolapse  Vagina- Normal, no atrophy, no lesions, no discharge, no prolapse  Cvx- {tjiudremoval:77068}  Uterus- {APUTERUS:32073}  Adnexa- {APADNEXA:41075}  Anus/Rectum/Perineum- {APRECTAL:80171}  Ext- No edema, no cyanosis    Skin- No lesions, no rashes, no acanthosis nigricans  {APNEXPLANON (Optional):76407}     ASSESSMENT and PLAN:    There are no diagnoses linked to this encounter.    Preventative:   {WWE Preventative:52881}  {PlanCounseling (Optional):72459}  {Risk of incomplete workup (Optional):15652}  {APOBINITIALPLAN (Optional):84003}  {Pregnancy Obesity (Optional):07543}    Follow Up:  No follow-ups on file.    {Time Spent (Optional):55947}    Liana Naidu MA  09/07/2023

## 2023-09-07 ENCOUNTER — OFFICE VISIT (OUTPATIENT)
Dept: OBSTETRICS AND GYNECOLOGY | Facility: CLINIC | Age: 28
End: 2023-09-07
Payer: COMMERCIAL

## 2023-09-07 VITALS
SYSTOLIC BLOOD PRESSURE: 128 MMHG | HEIGHT: 68 IN | BODY MASS INDEX: 36.53 KG/M2 | WEIGHT: 241 LBS | HEART RATE: 73 BPM | DIASTOLIC BLOOD PRESSURE: 88 MMHG

## 2023-09-07 DIAGNOSIS — N92.6 IRREGULAR MENSES: Primary | ICD-10-CM

## 2023-09-07 LAB
B-HCG UR QL: NEGATIVE
C TRACH RRNA CVX QL NAA+PROBE: NOT DETECTED
CANDIDA SPECIES: POSITIVE
DEPRECATED RDW RBC AUTO: 45.1 FL (ref 37–54)
ERYTHROCYTE [DISTWIDTH] IN BLOOD BY AUTOMATED COUNT: 13.2 % (ref 12.3–15.4)
EXPIRATION DATE: NORMAL
GARDNERELLA VAGINALIS: NEGATIVE
HCT VFR BLD AUTO: 39 % (ref 34–46.6)
HGB BLD-MCNC: 13.1 G/DL (ref 12–15.9)
INTERNAL NEGATIVE CONTROL: NORMAL
INTERNAL POSITIVE CONTROL: NORMAL
Lab: NORMAL
MCH RBC QN AUTO: 30.9 PG (ref 26.6–33)
MCHC RBC AUTO-ENTMCNC: 33.6 G/DL (ref 31.5–35.7)
MCV RBC AUTO: 92 FL (ref 79–97)
N GONORRHOEA RRNA SPEC QL NAA+PROBE: NOT DETECTED
PLATELET # BLD AUTO: 279 10*3/MM3 (ref 140–450)
PMV BLD AUTO: 11.4 FL (ref 6–12)
RBC # BLD AUTO: 4.24 10*6/MM3 (ref 3.77–5.28)
T VAGINALIS DNA VAG QL PROBE+SIG AMP: NEGATIVE
TSH SERPL DL<=0.05 MIU/L-ACNC: 1.14 UIU/ML (ref 0.27–4.2)
WBC NRBC COR # BLD: 8.91 10*3/MM3 (ref 3.4–10.8)

## 2023-09-07 PROCEDURE — 87660 TRICHOMONAS VAGIN DIR PROBE: CPT | Performed by: NURSE PRACTITIONER

## 2023-09-07 PROCEDURE — 87480 CANDIDA DNA DIR PROBE: CPT | Performed by: NURSE PRACTITIONER

## 2023-09-07 PROCEDURE — 85027 COMPLETE CBC AUTOMATED: CPT | Performed by: NURSE PRACTITIONER

## 2023-09-07 PROCEDURE — 84443 ASSAY THYROID STIM HORMONE: CPT | Performed by: NURSE PRACTITIONER

## 2023-09-07 PROCEDURE — 87491 CHLMYD TRACH DNA AMP PROBE: CPT | Performed by: NURSE PRACTITIONER

## 2023-09-07 PROCEDURE — 87510 GARDNER VAG DNA DIR PROBE: CPT | Performed by: NURSE PRACTITIONER

## 2023-09-07 PROCEDURE — 87591 N.GONORRHOEAE DNA AMP PROB: CPT | Performed by: NURSE PRACTITIONER

## 2023-09-07 NOTE — PATIENT INSTRUCTIONS
Venipuncture Blood Specimen Collection  Venipuncture performed in right arm by Carmita Nova with good hemostasis. Patient tolerated the procedure well without complications.   09/07/23   Carmita Nova

## 2023-09-07 NOTE — PROGRESS NOTES
GYN Problem/Follow Up Visit    Chief Complaint   Patient presents with    Follow-up     Constant yeast and AUB           HPI  Abbe Amos is a 28 y.o. female, , who presents for menstrual changes.   IUD removed , no menses until 10-22, menses infrequent  when resumed, occurring every 1-2 months, Since January menses had been monthly until 2023, had 2 periods one month. Menses heavy, has always been. On heavy days change products, ultra tampon, change every 3 hours x 1 heavy day. No menstrual cramping.   No new partner  Recent increased stress  Had covid last month    Sexually active, declines contraceptive management, not preventing    Additional OB/GYN History   Patient's last menstrual period was 08/15/2023 (approximate).  Current contraception: contraceptive methods: None    Past Medical History:   Diagnosis Date    Abnormal Pap smear of cervix     Ankle sprain 2022    Left ankle    Anxiety     Chemical dependency 2019    Depression     Fracture of toe of right foot     5th    Fracture of wrist 2020    Right hand    Hepatitis     HEP C- NO CURRENT PROBLEMS REPORTED    History of methamphetamine abuse 2020    Cont avoidance of drug use, especially now since pt is pregnant    History of self-harm 2020    HPV (human papilloma virus) infection     Hydradenitis     Hyperlipemia     Nicotine dependence     Night sweats     Pregnancy     Sebaceous cyst of left axilla     Shortness of breath     NO CURRENT PROBLEMS     STD (sexually transmitted disease)     Suicidal ideations 2020    Discussed suicidal thoughts. PT signed safety contract today. If thoughts return pt needs to call 911 or go immediately to ER for treatment. We will refer asap to Psychiatry for counseling. Discussed safety plan with pt. Discussed need to lock up all weapons and Meds in the home. Encouraged Pt to find things pt enjoys. Discussed different med options and side effects. RTC for  "re-evaluation.       Past Surgical History:   Procedure Laterality Date    EXCISION LESION Left 11/26/2021    Procedure: EXCISION CYST left axilla;  Surgeon: Serg Sterling MD;  [place] [facility]  Service: General;  Laterality: Left;    PILONIDAL CYST DRAINAGE      at age 11      Family History   Problem Relation Age of Onset    Thyroid cancer Mother         Gland, Malignant    Cancer Mother         Thyroid    Hypertension Mother     Malig Hyperthermia Neg Hx     Breast cancer Neg Hx     Ovarian cancer Neg Hx     Uterine cancer Neg Hx     Prostate cancer Neg Hx     Colon cancer Neg Hx      Allergies as of 09/07/2023    (No Known Allergies)      The additional following portions of the patient's history were reviewed and updated as appropriate: allergies, current medications, past family history, past medical history, past social history, past surgical history, and problem list.    Review of Systems    See HPI for pertinent ROS    Objective   /88   Pulse 73   Ht 172.7 cm (68\")   Wt 109 kg (241 lb)   LMP 08/15/2023 (Approximate)   BMI 36.64 kg/m²     Physical Exam  Vitals and nursing note reviewed. Exam conducted with a chaperone present.   Constitutional:       Appearance: Normal appearance.   Cardiovascular:      Rate and Rhythm: Normal rate.   Pulmonary:      Effort: Pulmonary effort is normal.   Genitourinary:     General: Normal vulva.      Vagina: Normal.      Cervix: Normal.      Uterus: Normal.       Adnexa: Right adnexa normal and left adnexa normal.   Lymphadenopathy:      Lower Body: No right inguinal adenopathy. No left inguinal adenopathy.   Skin:     General: Skin is warm and dry.   Neurological:      Mental Status: She is alert and oriented to person, place, and time.        Assessment and Plan    Diagnoses and all orders for this visit:    1. Irregular menses (Primary)  -     Chlamydia trachomatis, Neisseria gonorrhoeae, PCR - Swab, Cervix  -     Gardnerella vaginalis, " Trichomonas vaginalis, Candida albicans, DNA - Swab, Vagina  -     POC Pregnancy, Urine  -     TSH  -     CBC (No Diff)      HCG, Urine, QL   Date Value Ref Range Status   2023 Negative Negative Final       Counseling:  TRACK MENSES, RTO if <q21d, >7d long, heavy or painful.    All BIRTH CONTROL options R/B/A/SE/E of each reviewed in detail.  SAFE SEX/condoms importance reviewed.    PNV daily and healthy lifestyle  Pregnancy risks associated with Obesity: antepartum, intrapartum, postpartum complications to include: increased risk   birth, impaired growth, spontaneous , stillbirth, Neural tube defects, congenital anomalies, maternal-cardiac dysfunction, gestational diabetes, preeclampsia. Postpartum: wound dehiscence, venous thrombosis, maternal and fetal morbidity.  Talk with psychiatry regarding safety of medications during pregnancy-Trileptal-known risk teratogenic effects  Counseled regarding transient causes of menstrual irregularity, recommend menstrual calendar, fu if symptoms persist or worsen.     She understands the importance of having any ordered tests to be performed in a timely fashion.  The risks of not performing them include, but are not limited to, advanced cancer stages, bone loss from osteoporosis and/or subsequent increase in morbidity and/or mortality.  She is encouraged to review her results online and/or contact or office if she has questions.     Follow Up:  Return if symptoms worsen or fail to improve.        Bebe Medina, APRN  2023

## 2023-09-08 ENCOUNTER — TELEPHONE (OUTPATIENT)
Dept: OBSTETRICS AND GYNECOLOGY | Facility: CLINIC | Age: 28
End: 2023-09-08
Payer: COMMERCIAL

## 2023-09-08 DIAGNOSIS — B37.31 CANDIDA VAGINITIS: Primary | ICD-10-CM

## 2023-09-08 RX ORDER — FLUCONAZOLE 100 MG/1
100 TABLET ORAL
Qty: 3 TABLET | Refills: 0 | Status: SHIPPED | OUTPATIENT
Start: 2023-09-08 | End: 2023-09-15

## 2023-09-15 ENCOUNTER — TELEPHONE (OUTPATIENT)
Dept: GASTROENTEROLOGY | Facility: HOSPITAL | Age: 28
End: 2023-09-15
Payer: COMMERCIAL

## 2023-09-15 NOTE — TELEPHONE ENCOUNTER
----- Message from Portia Bee sent at 9/15/2023  8:03 AM EDT -----  Regarding: FW: Appointment canceled  Contact: [phone]    ----- Message -----  From: Abbe Amos  Sent: 9/14/2023   5:13 PM EDT  To: [facility]  [place]  Subject: Appointment canceled                             Appointment canceled for Abbe Amos ([id number])  Visit Type: NEW PATIENT  Date        Time      Length    Provider                  Department  1/12/2024    8:00 AM  15 mins.  IESHA Leblanc [facility] [place] [facility]    Reason for Cancellation: Other    Patient Comments: Got an appointment elsewhere  Called patient to see if she wanted to reschedule her appointment. She said no, she is going to a [place]. She got a sooner appointment.

## 2023-10-20 ENCOUNTER — APPOINTMENT (OUTPATIENT)
Dept: GENERAL RADIOLOGY | Facility: HOSPITAL | Age: 28
End: 2023-10-20
Payer: COMMERCIAL

## 2023-10-20 ENCOUNTER — HOSPITAL ENCOUNTER (EMERGENCY)
Facility: HOSPITAL | Age: 28
Discharge: LEFT BEFORE DISCHARGE | End: 2023-10-20
Attending: EMERGENCY MEDICINE
Payer: COMMERCIAL

## 2023-10-20 VITALS
DIASTOLIC BLOOD PRESSURE: 76 MMHG | HEIGHT: 66 IN | OXYGEN SATURATION: 98 % | HEART RATE: 85 BPM | BODY MASS INDEX: 38.48 KG/M2 | RESPIRATION RATE: 17 BRPM | TEMPERATURE: 98.5 F | SYSTOLIC BLOOD PRESSURE: 114 MMHG | WEIGHT: 239.42 LBS

## 2023-10-20 DIAGNOSIS — R10.30 LOWER ABDOMINAL PAIN: Primary | ICD-10-CM

## 2023-10-20 LAB
ALBUMIN SERPL-MCNC: 4.6 G/DL (ref 3.5–5.2)
ALBUMIN/GLOB SERPL: 1.1 G/DL
ALP SERPL-CCNC: 61 U/L (ref 39–117)
ALT SERPL W P-5'-P-CCNC: 140 U/L (ref 1–33)
ANION GAP SERPL CALCULATED.3IONS-SCNC: 10.8 MMOL/L (ref 5–15)
AST SERPL-CCNC: 51 U/L (ref 1–32)
BASOPHILS # BLD AUTO: 0.06 10*3/MM3 (ref 0–0.2)
BASOPHILS NFR BLD AUTO: 0.8 % (ref 0–1.5)
BILIRUB SERPL-MCNC: 0.4 MG/DL (ref 0–1.2)
BILIRUB UR QL STRIP: NEGATIVE
BUN SERPL-MCNC: 12 MG/DL (ref 6–20)
BUN/CREAT SERPL: 14.3 (ref 7–25)
CALCIUM SPEC-SCNC: 9.6 MG/DL (ref 8.6–10.5)
CHLORIDE SERPL-SCNC: 102 MMOL/L (ref 98–107)
CLARITY UR: CLEAR
CO2 SERPL-SCNC: 23.2 MMOL/L (ref 22–29)
COLOR UR: YELLOW
CREAT SERPL-MCNC: 0.84 MG/DL (ref 0.57–1)
DEPRECATED RDW RBC AUTO: 41.8 FL (ref 37–54)
EGFRCR SERPLBLD CKD-EPI 2021: 96.6 ML/MIN/1.73
EOSINOPHIL # BLD AUTO: 0.05 10*3/MM3 (ref 0–0.4)
EOSINOPHIL NFR BLD AUTO: 0.7 % (ref 0.3–6.2)
ERYTHROCYTE [DISTWIDTH] IN BLOOD BY AUTOMATED COUNT: 12.4 % (ref 12.3–15.4)
GLOBULIN UR ELPH-MCNC: 4.2 GM/DL
GLUCOSE SERPL-MCNC: 93 MG/DL (ref 65–99)
GLUCOSE UR STRIP-MCNC: NEGATIVE MG/DL
HCG INTACT+B SERPL-ACNC: <0.5 MIU/ML
HCT VFR BLD AUTO: 41.8 % (ref 34–46.6)
HGB BLD-MCNC: 14 G/DL (ref 12–15.9)
HGB UR QL STRIP.AUTO: NEGATIVE
HOLD SPECIMEN: NORMAL
HOLD SPECIMEN: NORMAL
IMM GRANULOCYTES # BLD AUTO: 0.02 10*3/MM3 (ref 0–0.05)
IMM GRANULOCYTES NFR BLD AUTO: 0.3 % (ref 0–0.5)
KETONES UR QL STRIP: NEGATIVE
LEUKOCYTE ESTERASE UR QL STRIP.AUTO: NEGATIVE
LIPASE SERPL-CCNC: 20 U/L (ref 13–60)
LYMPHOCYTES # BLD AUTO: 2.6 10*3/MM3 (ref 0.7–3.1)
LYMPHOCYTES NFR BLD AUTO: 34.9 % (ref 19.6–45.3)
MCH RBC QN AUTO: 30.8 PG (ref 26.6–33)
MCHC RBC AUTO-ENTMCNC: 33.5 G/DL (ref 31.5–35.7)
MCV RBC AUTO: 91.9 FL (ref 79–97)
MONOCYTES # BLD AUTO: 0.75 10*3/MM3 (ref 0.1–0.9)
MONOCYTES NFR BLD AUTO: 10.1 % (ref 5–12)
NEUTROPHILS NFR BLD AUTO: 3.98 10*3/MM3 (ref 1.7–7)
NEUTROPHILS NFR BLD AUTO: 53.2 % (ref 42.7–76)
NITRITE UR QL STRIP: NEGATIVE
NRBC BLD AUTO-RTO: 0 /100 WBC (ref 0–0.2)
PH UR STRIP.AUTO: 5.5 [PH] (ref 5–8)
PLATELET # BLD AUTO: 242 10*3/MM3 (ref 140–450)
PMV BLD AUTO: 10.8 FL (ref 6–12)
POTASSIUM SERPL-SCNC: 4 MMOL/L (ref 3.5–5.2)
PROT SERPL-MCNC: 8.8 G/DL (ref 6–8.5)
PROT UR QL STRIP: NEGATIVE
RBC # BLD AUTO: 4.55 10*6/MM3 (ref 3.77–5.28)
SODIUM SERPL-SCNC: 136 MMOL/L (ref 136–145)
SP GR UR STRIP: 1.02 (ref 1–1.03)
UROBILINOGEN UR QL STRIP: NORMAL
WBC NRBC COR # BLD: 7.46 10*3/MM3 (ref 3.4–10.8)
WHOLE BLOOD HOLD COAG: NORMAL
WHOLE BLOOD HOLD SPECIMEN: NORMAL

## 2023-10-20 PROCEDURE — 81003 URINALYSIS AUTO W/O SCOPE: CPT

## 2023-10-20 PROCEDURE — 99282 EMERGENCY DEPT VISIT SF MDM: CPT

## 2023-10-20 PROCEDURE — 84702 CHORIONIC GONADOTROPIN TEST: CPT

## 2023-10-20 PROCEDURE — 36415 COLL VENOUS BLD VENIPUNCTURE: CPT

## 2023-10-20 PROCEDURE — 83690 ASSAY OF LIPASE: CPT

## 2023-10-20 PROCEDURE — 80053 COMPREHEN METABOLIC PANEL: CPT

## 2023-10-20 PROCEDURE — 85025 COMPLETE CBC W/AUTO DIFF WBC: CPT

## 2023-10-20 RX ORDER — KETOROLAC TROMETHAMINE 30 MG/ML
30 INJECTION, SOLUTION INTRAMUSCULAR; INTRAVENOUS ONCE
Status: DISCONTINUED | OUTPATIENT
Start: 2023-10-20 | End: 2023-10-20 | Stop reason: HOSPADM

## 2023-10-20 RX ORDER — SODIUM CHLORIDE 0.9 % (FLUSH) 0.9 %
10 SYRINGE (ML) INJECTION AS NEEDED
Status: DISCONTINUED | OUTPATIENT
Start: 2023-10-20 | End: 2023-10-20 | Stop reason: HOSPADM

## 2023-10-20 NOTE — ED PROVIDER NOTES
Time: 1:15 PM EDT  Date of encounter:  10/20/2023  Independent Historian/Clinical History and Information was obtained by:   Patient    History is limited by: N/A    Chief Complaint: abdominal pain      History of Present Illness:  Patient is a 28 y.o. year old female who presents to the emergency department for evaluation of lower abdominal pain. She advises lower abdominal pain for 1 week that comes and goes. Her LMP was 9/16/23, she had a loose BM last night and has been nauseated at times. No fever, chills, vomiting, urinary symptoms, diarrhea or constipation. She advises she took a pregnancy test today and it was negative.    HPI    Patient Care Team  Primary Care Provider: Babs Muñoz PA-C    Past Medical History:     No Known Allergies  Past Medical History:   Diagnosis Date    Abnormal Pap smear of cervix     Ankle sprain 08/09/2022    Left ankle    Anxiety     Chemical dependency 06/2019    Depression     Fracture of toe of right foot     5th    Fracture of wrist 08/18/2020    Right hand    Hepatitis     HEP C- NO CURRENT PROBLEMS REPORTED    History of methamphetamine abuse 06/11/2020    Cont avoidance of drug use, especially now since pt is pregnant    History of self-harm 06/11/2020    HPV (human papilloma virus) infection     Hydradenitis     Hyperlipemia     Nicotine dependence     Night sweats     Pregnancy     Sebaceous cyst of left axilla     Shortness of breath     NO CURRENT PROBLEMS     STD (sexually transmitted disease)     Suicidal ideations 06/11/2020    Discussed suicidal thoughts. PT signed safety contract today. If thoughts return pt needs to call 911 or go immediately to ER for treatment. We will refer asap to Psychiatry for counseling. Discussed safety plan with pt. Discussed need to lock up all weapons and Meds in the home. Encouraged Pt to find things pt enjoys. Discussed different med options and side effects. RTC for re-evaluation.      Past Surgical History:   Procedure  Laterality Date    EXCISION LESION Left 11/26/2021    Procedure: EXCISION CYST left axilla;  Surgeon: Serg Sterling MD;  Location: [facility] MAIN OR;  Service: General;  Laterality: Left;    PILONIDAL CYST DRAINAGE      at age 11     Family History   Problem Relation Age of Onset    Thyroid cancer Mother         Gland, Malignant    Cancer Mother         Thyroid    Hypertension Mother     Malig Hyperthermia Neg Hx     Breast cancer Neg Hx     Ovarian cancer Neg Hx     Uterine cancer Neg Hx     Prostate cancer Neg Hx     Colon cancer Neg Hx        Home Medications:  Prior to Admission medications    Medication Sig Start Date End Date Taking? Authorizing Provider   buPROPion XL (WELLBUTRIN XL) 300 MG 24 hr tablet Take 1 tablet by mouth Daily. 5/28/21   Pritesh Rajput MD   busPIRone (BUSPAR) 10 MG tablet Take 2 tablets by mouth 3 (Three) Times a Day. 4/13/21   Pritesh Rajput MD   glycopyrrolate (ROBINUL) 1 MG tablet Take 1 tablet by mouth every night at bedtime. 5/22/21   Pritesh Rajput MD   ibuprofen (ADVIL,MOTRIN) 800 MG tablet Take 1 tablet by mouth Every 6 (Six) Hours As Needed for Mild Pain. 4/16/23   Bharti Dodd APRN   OXcarbazepine (TRILEPTAL) 150 MG tablet TAKE 1 TABLET BY MOUTH ONCE DAILY FOR 7 DAYS, THEN INCREASE TO TWICE DAILY THEREAFTER 12/13/21   Pritesh Rajput MD   prazosin (MINIPRESS) 2 MG capsule Take 1 capsule by mouth every night at bedtime. 10/3/22   Pritesh Rajput MD   sertraline (ZOLOFT) 100 MG tablet Take 2 tablets by mouth Daily. 4/28/21   Pritesh Rajput MD        Social History:   Social History     Tobacco Use    Smoking status: Former     Packs/day: 0.50     Years: 15.00     Additional pack years: 0.00     Total pack years: 7.50     Types: Cigarettes, Electronic Cigarette    Smokeless tobacco: Never    Tobacco comments:     Smoked 11-20 years- INSTRUCTED NO SMOKING 24 HR PRIOR TO SURGERY    Vaping Use    Vaping Use: Every day    Substances:  "Nicotine, Flavoring    Devices: Refillable tank, INSTRUCTED NO VAPING 24 HR PRIOR TO SURGERY    Substance Use Topics    Alcohol use: Yes     Comment: occ    Drug use: Not Currently     Types: Marijuana, Methamphetamines     Comment: Former, Meth USER- LAST USED 6-2020         Review of Systems:  Review of Systems   Constitutional: Negative.    HENT: Negative.     Eyes: Negative.    Respiratory: Negative.     Cardiovascular: Negative.    Gastrointestinal:  Positive for abdominal pain.   Endocrine: Negative.    Genitourinary: Negative.    Musculoskeletal: Negative.    Skin: Negative.    Allergic/Immunologic: Negative.    Neurological: Negative.    Hematological: Negative.    Psychiatric/Behavioral: Negative.          Physical Exam:  /76   Pulse 85   Temp 98.5 °F (36.9 °C) (Oral)   Resp 17   Ht 167.6 cm (66\")   Wt 109 kg (239 lb 6.7 oz)   SpO2 98%   BMI 38.64 kg/m²     Physical Exam  Constitutional:       Appearance: Normal appearance.   HENT:      Head: Normocephalic and atraumatic.      Nose: Nose normal.      Mouth/Throat:      Mouth: Mucous membranes are moist.   Eyes:      Pupils: Pupils are equal, round, and reactive to light.   Cardiovascular:      Rate and Rhythm: Normal rate and regular rhythm.      Pulses: Normal pulses.   Pulmonary:      Effort: Pulmonary effort is normal.      Breath sounds: Normal breath sounds.   Abdominal:      General: Abdomen is flat. Bowel sounds are normal.      Palpations: Abdomen is soft.      Tenderness: There is no abdominal tenderness.   Musculoskeletal:         General: Normal range of motion.      Cervical back: Normal range of motion.   Skin:     General: Skin is warm and dry.      Capillary Refill: Capillary refill takes less than 2 seconds.   Neurological:      General: No focal deficit present.      Mental Status: She is alert and oriented to person, place, and time. Mental status is at baseline.   Psychiatric:         Mood and Affect: Mood normal.            "       Procedures:  Procedures      Medical Decision Making:      Comorbidities that affect care:    None    External Notes reviewed:    None      The following orders were placed and all results were independently analyzed by me:  Orders Placed This Encounter   Procedures    XR Abdomen KUB    Spencer Draw    Comprehensive Metabolic Panel    Lipase    Urinalysis With Microscopic If Indicated (No Culture) - Urine, Clean Catch    hCG, Quantitative, Pregnancy    CBC Auto Differential    NPO Diet NPO Type: Strict NPO    Undress & Gown    Insert Peripheral IV    CBC & Differential    Green Top (Gel)    Lavender Top    Gold Top - SST    Light Blue Top       Medications Given in the Emergency Department:  Medications   sodium chloride 0.9 % flush 10 mL (has no administration in time range)   ketorolac (TORADOL) injection 30 mg (has no administration in time range)        ED Course:    ED Course as of 10/20/23 1609   Fri Oct 20, 2023   1525 Pt RN advises pt became agitated and refused Toradol injection or KUB stating 'You all are tying to kill my baby.' She was informed that her HCG is negative but she wanted to leave anyway.  [TP]      ED Course User Index  [TP] Mana Nguyen APRN       Labs:    Lab Results (last 24 hours)       Procedure Component Value Units Date/Time    CBC & Differential [[id number]]  (Normal) Collected: 10/20/23 1140    Specimen: Blood from Arm, Right Updated: 10/20/23 1146    Narrative:      The following orders were created for panel order CBC & Differential.  Procedure                               Abnormality         Status                     ---------                               -----------         ------                     CBC Auto Differential[[id number]]        Normal              Final result                 Please view results for these tests on the individual orders.    Comprehensive Metabolic Panel [[id number]]  (Abnormal) Collected: 10/20/23 1140    Specimen: Blood from Arm, Right  Updated: 10/20/23 1212     Glucose 93 mg/dL      BUN 12 mg/dL      Creatinine 0.84 mg/dL      Sodium 136 mmol/L      Potassium 4.0 mmol/L      Chloride 102 mmol/L      CO2 23.2 mmol/L      Calcium 9.6 mg/dL      Total Protein 8.8 g/dL      Albumin 4.6 g/dL      ALT (SGPT) 140 U/L      AST (SGOT) 51 U/L      Alkaline Phosphatase 61 U/L      Total Bilirubin 0.4 mg/dL      Globulin 4.2 gm/dL      A/G Ratio 1.1 g/dL      BUN/Creatinine Ratio 14.3     Anion Gap 10.8 mmol/L      eGFR 96.6 mL/min/1.73     Narrative:      GFR Normal >60  Chronic Kidney Disease <60  Kidney Failure <15      Lipase [[id number]]  (Normal) Collected: 10/20/23 1140    Specimen: Blood from Arm, Right Updated: 10/20/23 1212     Lipase 20 U/L     hCG, Quantitative, Pregnancy [[id number]] Collected: 10/20/23 1140    Specimen: Blood from Arm, Right Updated: 10/20/23 1213     HCG Quantitative <0.50 mIU/mL     Narrative:      HCG Ranges by Gestational Age    Females - non-pregnant premenopausal   </= 1mIU/mL HCG  Females - postmenopausal               </= 7mIU/mL HCG    3 Weeks       5.4   -      72 mIU/mL  4 Weeks      10.2   -     708 mIU/mL  5 Weeks       217   -   8,245 mIU/mL  6 Weeks       152   -  32,177 mIU/mL  7 Weeks     4,059   - 153,767 mIU/mL  8 Weeks    31,366   - 149,094 mIU/mL  9 Weeks    59,109   - 135,901 mIU/mL  10 Weeks   44,186   - 170,409 mIU/mL  12 Weeks   27,107   - 201,615 mIU/mL  14 Weeks   24,302   -  93,646 mIU/mL  15 Weeks   12,540   -  69,747 mIU/mL  16 Weeks    8,904   -  55,332 mIU/mL  17 Weeks    8,240   -  51,793 mIU/mL  18 Weeks    9,649   -  55,271 mIU/mL      CBC Auto Differential [[id number]]  (Normal) Collected: 10/20/23 1140    Specimen: Blood from Arm, Right Updated: 10/20/23 1146     WBC 7.46 10*3/mm3      RBC 4.55 10*6/mm3      Hemoglobin 14.0 g/dL      Hematocrit 41.8 %      MCV 91.9 fL      MCH 30.8 pg      MCHC 33.5 g/dL      RDW 12.4 %      RDW-SD 41.8 fl      MPV 10.8 fL      Platelets 242 10*3/mm3       Neutrophil % 53.2 %      Lymphocyte % 34.9 %      Monocyte % 10.1 %      Eosinophil % 0.7 %      Basophil % 0.8 %      Immature Grans % 0.3 %      Neutrophils, Absolute 3.98 10*3/mm3      Lymphocytes, Absolute 2.60 10*3/mm3      Monocytes, Absolute 0.75 10*3/mm3      Eosinophils, Absolute 0.05 10*3/mm3      Basophils, Absolute 0.06 10*3/mm3      Immature Grans, Absolute 0.02 10*3/mm3      nRBC 0.0 /100 WBC     Urinalysis With Microscopic If Indicated (No Culture) - Urine, Clean Catch [[id number]]  (Normal) Collected: 10/20/23 1152    Specimen: Urine, Clean Catch Updated: 10/20/23 1202     Color, UA Yellow     Appearance, UA Clear     pH, UA 5.5     Specific Gravity, UA 1.023     Glucose, UA Negative     Ketones, UA Negative     Bilirubin, UA Negative     Blood, UA Negative     Protein, UA Negative     Leuk Esterase, UA Negative     Nitrite, UA Negative     Urobilinogen, UA 1.0 E.U./dL    Narrative:      Urine microscopic not indicated.             Imaging:    No Radiology Exams Resulted Within Past 24 Hours      Differential Diagnosis and Discussion:    Abdominal Pain: Based on the patient's signs and symptoms, I considered abdominal aortic aneurysm, small bowel obstruction, pancreatitis, acute cholecystitis, acute appendecitis, peptic ulcer disease, gastritis, colitis, endocrine disorders, irritable bowel syndrome and other differential diagnosis an etiology of the patient's abdominal pain.    All labs were reviewed and interpreted by me.    MDM     Amount and/or Complexity of Data Reviewed  Clinical lab tests: reviewed             Patient Care Considerations:           Consultants/Shared Management Plan:    None    Social Determinants of Health:    Patient is independent, reliable, and has access to care.       Disposition and Care Coordination:    AMA: Patient has decided to leave our facility against medical advice. I have assessed the patient´s ability to make an informed decision and it is my opinion at this  time that the patient has the medical decision-making capacity to comprehend information regarding current medical condition and appreciates the impact of the disease or condition and the consequences of various options for treatment, including foregoing treatment. The patient possesses the ability to evaluate all treatment options, compare the risks and benefits of each option, communicate choice in a consistent manner over time, and is able to make rational choices. I have explained to the patient the further testing, treatment, and evaluation I would like to perform during the current emergency department visit as well as any possible alternatives that could be accomplished in a timely manner. I have outlined the possible risks of forgoing any all of these interventions and the patient understands and acknowledges that the decision to leave may result in undesirable consequences such as death, permanent disability, and/or loss of current lifestyle. Even though leaving AMA is not ideal, I have instructed the patient to follow any discharge instructions given, take any medications prescribed, and resume care as soon as possible with any other provider. Additionally, we clearly stated that the patient is welcome to return it anytime to continue care at our facility.    I have explained the patient´s condition, diagnoses and treatment plan based on the information available to me at this time. I have answered questions and addressed any concerns. The patient has a good  understanding of the patient´s diagnosis, condition, and treatment plan as can be expected at this point. The vital signs have been stable. The patient´s condition is stable and appropriate for discharge from the emergency department.      The patient will pursue further outpatient evaluation with the primary care physician or other designated or consulting physician as outlined in the discharge instructions. They are agreeable to this plan of care  and follow-up instructions have been explained in detail. The patient has received these instructions in written format and have expressed an understanding of the discharge instructions. The patient is aware that any significant change in condition or worsening of symptoms should prompt an immediate return to this or the closest emergency department or call to 911.       Medication List      No changes were made to your prescriptions during this visit.      No follow-up provider specified.     Final diagnoses:   Lower abdominal pain        ED Disposition       ED Disposition   AMA    Condition   --    Comment   --               This medical record created using voice recognition software.             Mana Nguyen, APRN  10/20/23 [id number]

## 2023-10-23 ENCOUNTER — TELEPHONE (OUTPATIENT)
Dept: INTERNAL MEDICINE | Facility: CLINIC | Age: 28
End: 2023-10-23
Payer: COMMERCIAL

## 2023-10-23 NOTE — TELEPHONE ENCOUNTER
Caller: Abbe Amos    Relationship: Self    Best call back number: [phone]    What is the best time to reach you: AFTER 2:30 WOULD BE GREAT.     Who are you requesting to speak with (clinical staff, provider,  specific staff member): NURSE     What was the call regarding: PATIENT IS HAVING A PROCEDURE DONE AND CAN NOT BE PREGNANT AND WOULD LIKE TO GET PUT ON BIRTH CONTROL.  HAS NOT HAD A PEIORD FOR 38 DAYS AND WANTED TO SPEAK TO PCP ABOUT ALL THIS.

## 2023-10-24 NOTE — TELEPHONE ENCOUNTER
Babs Muñoz PA-C   to [facility]        10/24/23 11:44 AM  Note      Pt was seen by GYN for this last month. They recommended f/u if sx persisted, I would tell her to contact them for re-eval and discuss next steps.          Patient is aware.

## 2023-10-25 ENCOUNTER — TELEPHONE (OUTPATIENT)
Dept: PODIATRY | Facility: CLINIC | Age: 28
End: 2023-10-25
Payer: COMMERCIAL

## 2023-11-03 ENCOUNTER — OFFICE VISIT (OUTPATIENT)
Dept: PODIATRY | Facility: CLINIC | Age: 28
End: 2023-11-03
Payer: COMMERCIAL

## 2023-11-03 VITALS
BODY MASS INDEX: 38.57 KG/M2 | WEIGHT: 240 LBS | TEMPERATURE: 97.8 F | DIASTOLIC BLOOD PRESSURE: 71 MMHG | HEIGHT: 66 IN | OXYGEN SATURATION: 97 % | HEART RATE: 67 BPM | SYSTOLIC BLOOD PRESSURE: 107 MMHG

## 2023-11-03 DIAGNOSIS — S93.601A SPRAIN OF RIGHT FOOT, INITIAL ENCOUNTER: ICD-10-CM

## 2023-11-03 DIAGNOSIS — S92.511A CLOSED DISPLACED FRACTURE OF PROXIMAL PHALANX OF LESSER TOE OF RIGHT FOOT, INITIAL ENCOUNTER: Primary | ICD-10-CM

## 2023-11-03 NOTE — PROGRESS NOTES
[facility] - PODIATRY    Today's Date: 11/03/23    Patient Name: Abbe Amos  MRN: [id number]  CSN: [id number]  PCP: Babs Muñoz PA-C,   Referring Provider: Rebekah Malcolm APRN    SUBJECTIVE     Chief Complaint   Patient presents with    Right Foot - Follow-up, Fracture     Went to  10/24/23, was given post op shoe, not wearing   Kicked bed again, feels better   Xray on chart      HPI: Abbe Amos, a 28 y.o.female, presents to clinic.    Patient 28-year-old female presenting with right foot fracture.  Patient states she kicked a box bring on 7/17/2023.  Patient she states that she broke this toe a few weeks earlier.  She states it has been causing her problems for about 6 to 7 weeks.  She is here for further treatment.  States that she has been sona splinting it and the positioning of her toe has improved.    11.3.23 States she has stubbed her toe several times. It is feeling better.   Past Medical History:   Diagnosis Date    Abnormal Pap smear of cervix     Ankle sprain 08/09/2022    Left ankle    Anxiety     Chemical dependency 06/2019    Depression     Fracture of toe of right foot     5th    Fracture of wrist 08/18/2020    Right hand    Hepatitis     HEP C- NO CURRENT PROBLEMS REPORTED    History of methamphetamine abuse 06/11/2020    Cont avoidance of drug use, especially now since pt is pregnant    History of self-harm 06/11/2020    HPV (human papilloma virus) infection     Hydradenitis     Hyperlipemia     Nicotine dependence     Night sweats     Pregnancy     Sebaceous cyst of left axilla     Shortness of breath     NO CURRENT PROBLEMS     STD (sexually transmitted disease)     Suicidal ideations 06/11/2020    Discussed suicidal thoughts. PT signed safety contract today. If thoughts return pt needs to call 911 or go immediately to ER for treatment. We will refer asap to Psychiatry for counseling. Discussed safety plan with pt. Discussed need to lock up all weapons and Meds in the  home. Encouraged Pt to find things pt enjoys. Discussed different med options and side effects. RTC for re-evaluation.      Past Surgical History:   Procedure Laterality Date    EXCISION LESION Left 2021    Procedure: EXCISION CYST left axilla;  Surgeon: Serg Sterling MD;  Location: [facility] OR;  Service: General;  Laterality: Left;    PILONIDAL CYST DRAINAGE      at age 11     Family History   Problem Relation Age of Onset    Thyroid cancer Mother         Gland, Malignant    Cancer Mother         Thyroid    Hypertension Mother     Malig Hyperthermia Neg Hx     Breast cancer Neg Hx     Ovarian cancer Neg Hx     Uterine cancer Neg Hx     Prostate cancer Neg Hx     Colon cancer Neg Hx      Social History     Socioeconomic History    Marital status: Single   Tobacco Use    Smoking status: Former     Packs/day: 0.50     Years: 15.00     Additional pack years: 0.00     Total pack years: 7.50     Types: Cigarettes     Quit date: 2023     Years since quittin.3    Smokeless tobacco: Never    Tobacco comments:     Smoked 11-20 years- INSTRUCTED NO SMOKING 24 HR PRIOR TO SURGERY    Vaping Use    Vaping Use: Every day    Substances: Nicotine, Flavoring    Devices: Refillable tank, INSTRUCTED NO VAPING 24 HR PRIOR TO SURGERY    Substance and Sexual Activity    Alcohol use: Yes     Comment: occ    Drug use: Not Currently     Types: Marijuana, Methamphetamines     Comment: Former, Meth USER- LAST USED     Sexual activity: Yes     Partners: Male     Birth control/protection: None     No Known Allergies  Current Outpatient Medications   Medication Sig Dispense Refill    busPIRone (BUSPAR) 10 MG tablet Take 2 tablets by mouth 3 (Three) Times a Day.      FLUoxetine (PROzac) 20 MG capsule Take 1 capsule by mouth Daily.      glycopyrrolate (ROBINUL) 1 MG tablet Take 1 tablet by mouth every night at bedtime.      ibuprofen (ADVIL,MOTRIN) 800 MG tablet Take 1 tablet by mouth Every 6 (Six) Hours As Needed  for Mild Pain. 40 tablet 0    prazosin (MINIPRESS) 2 MG capsule Take 1 capsule by mouth every night at bedtime.      Sofosbuvir-Velpatasvir 400-100 MG tablet Take 1 tablet by mouth Daily.       No current facility-administered medications for this visit.     Review of Systems   Constitutional: Negative.    HENT: Negative.     Eyes: Negative.    Respiratory: Negative.     Cardiovascular: Negative.    Gastrointestinal: Negative.    Endocrine: Negative.    Genitourinary: Negative.    Musculoskeletal: Negative.         Right fifth toe pain   Skin: Negative.    Allergic/Immunologic: Negative.    Neurological: Negative.    Hematological: Negative.    Psychiatric/Behavioral: Negative.     All other systems reviewed and are negative.      OBJECTIVE     Vitals:    11/03/23 1009   BP: 107/71   Pulse: 67   Temp: 97.8 °F (36.6 °C)   SpO2: 97%       WBC   Date Value Ref Range Status   10/20/2023 7.46 3.40 - 10.80 10*3/mm3 Final     RBC   Date Value Ref Range Status   10/20/2023 4.55 3.77 - 5.28 10*6/mm3 Final     Hemoglobin   Date Value Ref Range Status   10/20/2023 14.0 12.0 - 15.9 g/dL Final     Hematocrit   Date Value Ref Range Status   10/20/2023 41.8 34.0 - 46.6 % Final     MCV   Date Value Ref Range Status   10/20/2023 91.9 79.0 - 97.0 fL Final     MCH   Date Value Ref Range Status   10/20/2023 30.8 26.6 - 33.0 pg Final     MCHC   Date Value Ref Range Status   10/20/2023 33.5 31.5 - 35.7 g/dL Final     RDW   Date Value Ref Range Status   10/20/2023 12.4 12.3 - 15.4 % Final     RDW-SD   Date Value Ref Range Status   10/20/2023 41.8 37.0 - 54.0 fl Final     MPV   Date Value Ref Range Status   10/20/2023 10.8 6.0 - 12.0 fL Final     Platelets   Date Value Ref Range Status   10/20/2023 242 140 - 450 10*3/mm3 Final     Neutrophil %   Date Value Ref Range Status   10/20/2023 53.2 42.7 - 76.0 % Final     Lymphocyte %   Date Value Ref Range Status   10/20/2023 34.9 19.6 - 45.3 % Final     Monocyte %   Date Value Ref Range Status    10/20/2023 10.1 5.0 - 12.0 % Final     Eosinophil %   Date Value Ref Range Status   10/20/2023 0.7 0.3 - 6.2 % Final     Basophil %   Date Value Ref Range Status   10/20/2023 0.8 0.0 - 1.5 % Final     Immature Grans %   Date Value Ref Range Status   10/20/2023 0.3 0.0 - 0.5 % Final     Neutrophils, Absolute   Date Value Ref Range Status   10/20/2023 3.98 1.70 - 7.00 10*3/mm3 Final     Lymphocytes, Absolute   Date Value Ref Range Status   10/20/2023 2.60 0.70 - 3.10 10*3/mm3 Final     Monocytes, Absolute   Date Value Ref Range Status   10/20/2023 0.75 0.10 - 0.90 10*3/mm3 Final     Eosinophils, Absolute   Date Value Ref Range Status   10/20/2023 0.05 0.00 - 0.40 10*3/mm3 Final     Basophils, Absolute   Date Value Ref Range Status   10/20/2023 0.06 0.00 - 0.20 10*3/mm3 Final     Immature Grans, Absolute   Date Value Ref Range Status   10/20/2023 0.02 0.00 - 0.05 10*3/mm3 Final     nRBC   Date Value Ref Range Status   10/20/2023 0.0 0.0 - 0.2 /100 WBC Final         Lab Results   Component Value Date    GLUCOSE 93 10/20/2023    BUN 12 10/20/2023    CREATININE 0.84 10/20/2023    BCR 14.3 10/20/2023    K 4.0 10/20/2023    CO2 23.2 10/20/2023    CALCIUM 9.6 10/20/2023    ALBUMIN 4.6 10/20/2023    LABIL2 0.9 (L) 05/28/2021    AST 51 (H) 10/20/2023     (H) 10/20/2023       Patient seen in no apparent distress.      PHYSICAL EXAM:     Foot/Ankle Exam    GENERAL  Appearance:  appears stated age  Orientation:  AAOx3  Affect:  appropriate  Gait:  unimpaired  Assistance:  independent  Right shoe gear: casual shoe  Left shoe gear: casual shoe    VASCULAR     Right Foot Vascularity   Normal vascular exam    Dorsalis pedis:  2+  Posterior tibial:  2+  Skin temperature:  warm  Edema grading:  None  CFT:  < 3 seconds  Pedal hair growth:  Present  Varicosities:  none     Left Foot Vascularity   Normal vascular exam    Dorsalis pedis:  2+  Posterior tibial:  2+  Skin temperature:  warm  Edema grading:  None  CFT:  < 3  seconds  Pedal hair growth:  Present  Varicosities:  none     NEUROLOGIC     Right Foot Neurologic   Normal sensation    Light touch sensation: normal  Vibratory sensation: normal  Hot/Cold sensation: normal  Protective Sensation using [place]ath[place] Monofilament:   Sites intact: 10  Sites tested: 10     Left Foot Neurologic   Normal sensation    Light touch sensation: normal  Vibratory sensation: normal  Hot/Cold sensation:  normal  Protective Sensation using [place]athish Monofilament:   Sites intact: 10  Sites tested: 10    MUSCULOSKELETAL     Right Foot Musculoskeletal   Tenderness:  MTP 5 dorsal tenderness      MUSCLE STRENGTH     Right Foot Muscle Strength   Foot dorsiflexion:  4  Foot plantar flexion:  4  Foot inversion:  4  Foot eversion:  4     Left Foot Muscle Strength   Foot dorsiflexion:  4  Foot plantar flexion:  4  Foot inversion:  4  Foot eversion:  4    RANGE OF MOTION     Right Foot Range of Motion   Foot and ankle ROM within normal limits    5th MTP extension:  with pain  5th MTP flexion:  with pain     Left Foot Range of Motion   Foot and ankle ROM within normal limits      DERMATOLOGIC      Right Foot Dermatologic   Skin  Right foot skin is intact.      Left Foot Dermatologic   Skin  Left foot skin is intact.           XR Toe 2+ View Right    Result Date: 10/24/2023  Narrative: PROCEDURE: XR TOE 2+ VW RIGHT  COMPARISON: [facility], , XR TOE 2+ VW RIGHT, 7/17/2023, 20:20.  INDICATIONS: Struck her toe about a hour ago on a bed post. Pain 5th MTP. Previous similiar injury.  FINDINGS:  There is a subacute fracture involving the proximal phalanx of the 5th digit.  There is improved alignment when compared to the prior with interval increase in sclerosis and callus formation.  The articulations remain intact without dislocation.  The soft tissues are unremarkable.      Impression:   1. Subacute fracture of the proximal phalanx of the 5th digit.  There is improved alignment when  compared to the prior.  There is also interval increase in sclerosis/callus formation indicating partial interval healing.  The fracture lines remain visible.  There is no dislocation.      TITA PARTIDA MD       Electronically Signed and Approved By: TITA PARTIDA MD on 10/24/2023 at 19:04              ASSESSMENT/PLAN     Diagnoses and all orders for this visit:    1. Closed displaced fracture of proximal phalanx of lesser toe of right foot, initial encounter (Primary)    2. Sprain of right foot, initial encounter      Patient to begin stretching exercises and icing in the evening as tolerated. Discussed compression therapy and resting the extremity.  Anti-inflammatory medication to begin taking if okay by PCP.    Buddy splinting as needed    Return to clinic PRN    Comprehensive lower extremity examination and evaluation was performed.    Discussed findings and treatment plan including risks, benefits, and treatment options with patient in detail. Patient agreed with treatment plan.    Medications and allergies reviewed.  Reviewed available lab values along with other pertinent labs.  These were discussed with the patient.    An After Visit Summary was printed and given to the patient at discharge, including (if requested) any available informative/educational handouts regarding diagnosis, treatment, or medications. All questions were answered to patient/family satisfaction. Should symptoms fail to improve or worsen they agree to call or return to clinic or to go to the Emergency Department. Discussed the importance of following up with any needed screening tests/labs/specialist appointments and any requested follow-up recommended by me today. Importance of maintaining follow-up discussed and patient accepts that missed appointments can delay diagnosis and potentially lead to worsening of conditions.    No follow-ups on file., or sooner if acute issues arise.    This document has been electronically signed by  Josr Sylvester DPM on November 3, 2023 12:09 EDT

## 2024-03-21 ENCOUNTER — INITIAL PRENATAL (OUTPATIENT)
Dept: OBSTETRICS AND GYNECOLOGY | Facility: CLINIC | Age: 29
End: 2024-03-21
Payer: COMMERCIAL

## 2024-03-21 VITALS — SYSTOLIC BLOOD PRESSURE: 132 MMHG | BODY MASS INDEX: 38.74 KG/M2 | WEIGHT: 240 LBS | DIASTOLIC BLOOD PRESSURE: 78 MMHG

## 2024-03-21 DIAGNOSIS — O99.210 OBESITY AFFECTING PREGNANCY, ANTEPARTUM, UNSPECIFIED OBESITY TYPE: ICD-10-CM

## 2024-03-21 DIAGNOSIS — O98.419 CHRONIC HEPATITIS C COMPLICATING PREGNANCY, ANTEPARTUM: ICD-10-CM

## 2024-03-21 DIAGNOSIS — Z34.80 SUPERVISION OF OTHER NORMAL PREGNANCY, ANTEPARTUM: Primary | ICD-10-CM

## 2024-03-21 DIAGNOSIS — F41.1 GENERALIZED ANXIETY DISORDER: ICD-10-CM

## 2024-03-21 DIAGNOSIS — F33.1 MAJOR DEPRESSIVE DISORDER, RECURRENT EPISODE, MODERATE DEGREE: ICD-10-CM

## 2024-03-21 DIAGNOSIS — B18.2 CHRONIC HEPATITIS C COMPLICATING PREGNANCY, ANTEPARTUM: ICD-10-CM

## 2024-03-21 DIAGNOSIS — O09.899 HX OF PRETERM DELIVERY, CURRENTLY PREGNANT: ICD-10-CM

## 2024-03-21 LAB
ABO GROUP BLD: NORMAL
AMPHET+METHAMPHET UR QL: NEGATIVE
B-HCG UR QL: POSITIVE
BARBITURATES UR QL SCN: NEGATIVE
BASOPHILS # BLD AUTO: 0.04 10*3/MM3 (ref 0–0.2)
BASOPHILS NFR BLD AUTO: 0.4 % (ref 0–1.5)
BENZODIAZ UR QL SCN: NEGATIVE
BLD GP AB SCN SERPL QL: NEGATIVE
CANNABINOIDS SERPL QL: NEGATIVE
COCAINE UR QL: NEGATIVE
DEPRECATED RDW RBC AUTO: 40.6 FL (ref 37–54)
EOSINOPHIL # BLD AUTO: 0.03 10*3/MM3 (ref 0–0.4)
EOSINOPHIL NFR BLD AUTO: 0.3 % (ref 0.3–6.2)
ERYTHROCYTE [DISTWIDTH] IN BLOOD BY AUTOMATED COUNT: 12.2 % (ref 12.3–15.4)
EXPIRATION DATE: ABNORMAL
FENTANYL UR-MCNC: NEGATIVE NG/ML
GLUCOSE UR STRIP-MCNC: NEGATIVE MG/DL
HBV SURFACE AG SERPL QL IA: NORMAL
HCT VFR BLD AUTO: 37.9 % (ref 34–46.6)
HCV AB SER DONR QL: REACTIVE
HGB BLD-MCNC: 12.7 G/DL (ref 12–15.9)
HIV 1+2 AB+HIV1 P24 AG SERPL QL IA: NORMAL
IMM GRANULOCYTES # BLD AUTO: 0.02 10*3/MM3 (ref 0–0.05)
IMM GRANULOCYTES NFR BLD AUTO: 0.2 % (ref 0–0.5)
INTERNAL NEGATIVE CONTROL: NEGATIVE
INTERNAL POSITIVE CONTROL: ABNORMAL
LYMPHOCYTES # BLD AUTO: 1.98 10*3/MM3 (ref 0.7–3.1)
LYMPHOCYTES NFR BLD AUTO: 21.1 % (ref 19.6–45.3)
Lab: ABNORMAL
MCH RBC QN AUTO: 30.9 PG (ref 26.6–33)
MCHC RBC AUTO-ENTMCNC: 33.5 G/DL (ref 31.5–35.7)
MCV RBC AUTO: 92.2 FL (ref 79–97)
METHADONE UR QL SCN: NEGATIVE
MONOCYTES # BLD AUTO: 0.73 10*3/MM3 (ref 0.1–0.9)
MONOCYTES NFR BLD AUTO: 7.8 % (ref 5–12)
NEUTROPHILS NFR BLD AUTO: 6.57 10*3/MM3 (ref 1.7–7)
NEUTROPHILS NFR BLD AUTO: 70.2 % (ref 42.7–76)
NRBC BLD AUTO-RTO: 0 /100 WBC (ref 0–0.2)
OPIATES UR QL: NEGATIVE
OXYCODONE UR QL SCN: NEGATIVE
PLATELET # BLD AUTO: 264 10*3/MM3 (ref 140–450)
PMV BLD AUTO: 11 FL (ref 6–12)
PROT UR STRIP-MCNC: NEGATIVE MG/DL
RBC # BLD AUTO: 4.11 10*6/MM3 (ref 3.77–5.28)
RH BLD: POSITIVE
T PALLIDUM IGG SER QL: NORMAL
WBC NRBC COR # BLD AUTO: 9.37 10*3/MM3 (ref 3.4–10.8)

## 2024-03-21 PROCEDURE — 86900 BLOOD TYPING SEROLOGIC ABO: CPT | Performed by: NURSE PRACTITIONER

## 2024-03-21 PROCEDURE — 87491 CHLMYD TRACH DNA AMP PROBE: CPT | Performed by: NURSE PRACTITIONER

## 2024-03-21 PROCEDURE — 85025 COMPLETE CBC W/AUTO DIFF WBC: CPT | Performed by: NURSE PRACTITIONER

## 2024-03-21 PROCEDURE — 87624 HPV HI-RISK TYP POOLED RSLT: CPT | Performed by: NURSE PRACTITIONER

## 2024-03-21 PROCEDURE — 80307 DRUG TEST PRSMV CHEM ANLYZR: CPT | Performed by: NURSE PRACTITIONER

## 2024-03-21 PROCEDURE — 86780 TREPONEMA PALLIDUM: CPT | Performed by: NURSE PRACTITIONER

## 2024-03-21 PROCEDURE — G0123 SCREEN CERV/VAG THIN LAYER: HCPCS | Performed by: NURSE PRACTITIONER

## 2024-03-21 PROCEDURE — 86762 RUBELLA ANTIBODY: CPT | Performed by: NURSE PRACTITIONER

## 2024-03-21 PROCEDURE — 87340 HEPATITIS B SURFACE AG IA: CPT | Performed by: NURSE PRACTITIONER

## 2024-03-21 PROCEDURE — 87086 URINE CULTURE/COLONY COUNT: CPT | Performed by: NURSE PRACTITIONER

## 2024-03-21 PROCEDURE — 87661 TRICHOMONAS VAGINALIS AMPLIF: CPT | Performed by: NURSE PRACTITIONER

## 2024-03-21 PROCEDURE — 86850 RBC ANTIBODY SCREEN: CPT | Performed by: NURSE PRACTITIONER

## 2024-03-21 PROCEDURE — 87591 N.GONORRHOEAE DNA AMP PROB: CPT | Performed by: NURSE PRACTITIONER

## 2024-03-21 PROCEDURE — G0432 EIA HIV-1/HIV-2 SCREEN: HCPCS | Performed by: NURSE PRACTITIONER

## 2024-03-21 PROCEDURE — 86901 BLOOD TYPING SEROLOGIC RH(D): CPT | Performed by: NURSE PRACTITIONER

## 2024-03-21 PROCEDURE — 83020 HEMOGLOBIN ELECTROPHORESIS: CPT | Performed by: NURSE PRACTITIONER

## 2024-03-21 PROCEDURE — 86803 HEPATITIS C AB TEST: CPT | Performed by: NURSE PRACTITIONER

## 2024-03-21 NOTE — PROGRESS NOTES
OB Initial Visit    CC- Here for care of current pregnancy, first visit    Subjective:  29 y.o.  presenting for her first obstetrical visit.    LMP: Patient's last menstrual period was 2024.     Pt has no complaints, is doing well    Happy to be pregnant.     Reviewed and updated:  OBHx, GYNHx (STDs), PMHx, Medications, Allergies, PSHx, Social Hx, Preventative Hx (PAP), Hx of abuse/safe environment, Vaccine Hx including hx of chickenpox or vaccine, Genetic Hx (pt, FOB, both families).        Objective:  /78   Wt 109 kg (240 lb)   LMP 2024   BMI 38.74 kg/m²      Urine pregnancy test is positive     General- NAD, alert and oriented, appropriate  Psych- Normal mood, good memory  Neck- No masses, no thyroid enlargement  CV- Regular rhythm, no murnurs  Resp- CTA to bases, no wheezes  Abdomen- Soft, non distended, non tender, no masses    Breast left- deferred  Breast right- deferred    External genitalia- Normal, no lesions  Urethra- Normal, no masses, non tender  Vagina- Normal, no discharge  Bladder- Normal, no masses, non tender  Cvx- Normal, no lesions, no discharge, no CMT  Uterus- Normal shape and consistency, non tender, Consistent with dates, Bedside US consistent with dates.  -160..    Adnexa- Normal, no mass, non tender    Lymphatic- No palpable neck, axillary, or groin nodes  Ext- No edema, no cyanosis    Skin- No lesions, no rashes, no acanthosis nigricans    Assessment and Plan:  9w4d  Diagnoses and all orders for this visit:    1. Supervision of other normal pregnancy, antepartum (Primary)  Overview:  ARI finalized: 10/20/24 per LMP, dating scan ordered    Optional testing NIPS,CF/SMA,AFP:  NIPS pending, considering CF/SMA    COVID: Recommended 3/21/24  Flu: Recommended 3/21/24  Tdap:  RSV:    Rhogam:  28-32 weeks repeat TPA:  ? Desires Sterilization:    Anatomy US:  FU US:    PROBLEM LIST/PLAN:   Maternal obesity - plan early 1hGTT    Anxiety/depression - stable on  current medications, R/B reviewed    Chronic Hep C - recently finished treatment, doing well        Orders:  -     POC Urinalysis Dipstick  -     IGP,CtNgTv,Apt HPV,rfx 16 / 18,45  -     OB Panel With HIV  -     Urine Culture - Urine, Urine, Clean Catch  -     Urine Drug Screen - Urine, Clean Catch  -     Hemoglobinopathy Fractionation Coke  -     POC Pregnancy, Urine  -     [id number] PLUS Core+SCA+ESS - Blood, Arm, Left  -     US Ob < 14 Weeks Single or First Gestation; Future    2. Obesity affecting pregnancy, antepartum, unspecified obesity type    3. Generalized anxiety disorder  Assessment & Plan:  Stable on current medications, R/B of medication use during pregnancy reviewed with patient.        4. Major depressive disorder, recurrent episode, moderate degree  Assessment & Plan:  Stable on current medications, R/B of continuing medications during pregnancy reviewed.       5. Chronic hepatitis C complicating pregnancy, antepartum  Overview:  Recently completed treatment, has follow up scheduled.  Doing well.      6. Hx of  delivery, currently pregnant  Overview:  With G2, SROM at 36 weeks          Genetic Screening:   Considering   CF  NIPS  AFP only    Vaccines:   Recommend FLU vaccine this season, R/B discussed  Recommend COVID vaccine, R/B discussed    Counseling:   Nutrition discussed, calories, activity/exercise in pregnancy  Discussed dietary restrictions/safety food preparation in pregnancy  Reviewed what to expect prenatal visits, office providers (female and male) and covering [facility] Hospitalists/Dr. Hood  Appropriate trimester precautions provided, N/V, vag bleeding, cramping  VACCINE importance in pregnancy discussed.  Maternal and fetal risk of not being vaccinated reviewed NLT increased risk maternal/fetal severity of illness/death, PTD, CS, hemorrhage, HTN, possible IUFD.  Significant maternal and fetal/infant benefit w vaccination.  FDA approval and ACOG/SMFM/CDC strong recommendation  in pregnancy.  Questions answered.   Questions answered    Labs:   Prenatal labs, cultures, and PAP performed (prn)    Return in about 4 weeks (around 4/18/2024) for [facility], OB follow up.      Pawel Mayes, APRN  03/21/2024    [facility] [address]  [facility]  [address] [place] [address]  Dept: [phone]  Dept Fax: [phone]  Loc: [phone]

## 2024-03-23 LAB
BACTERIA SPEC AEROBE CULT: NO GROWTH
RUBV IGG SERPL IA-ACNC: 2.75 INDEX

## 2024-03-25 LAB
5P15 DELETION (CRI-DU-CHAT): NOT DETECTED
C TRACH RRNA CVX QL NAA+PROBE: NEGATIVE
CFDNA.FET/CFDNA.TOTAL SFR FETUS: NORMAL %
CITATION REF LAB TEST: NORMAL
CYTOLOGIST CVX/VAG CYTO: NORMAL
CYTOLOGY CVX/VAG DOC CYTO: NORMAL
CYTOLOGY CVX/VAG DOC THIN PREP: NORMAL
DX ICD CODE: NORMAL
FET 13+18+21+X+Y ANEUP PLAS.CFDNA: NEGATIVE
FET 1P36 DEL RISK WBC.DNA+CFDNA QL: NOT DETECTED
FET 22Q11.2 DEL RISK WBC.DNA+CFDNA QL: NOT DETECTED
FET CHR 11Q23 DEL PLAS.CFDNA QL: NOT DETECTED
FET CHR 15Q11 DEL PLAS.CFDNA QL: NOT DETECTED
FET CHR 21 TS PLAS.CFDNA QL: NEGATIVE
FET CHR 4P16 DEL PLAS.CFDNA QL: NOT DETECTED
FET CHR 8Q24 DEL PLAS.CFDNA QL: NOT DETECTED
FET MS X RISK WBC.DNA+CFDNA QL: NOT DETECTED
FET SEX PLAS.CFDNA DOSAGE CFDNA: NORMAL
FET TS 13 RISK PLAS.CFDNA QL: NEGATIVE
FET TS 18 RISK WBC.DNA+CFDNA QL: NEGATIVE
FET X + Y ANEUP RISK PLAS.CFDNA SEQ-IMP: NOT DETECTED
GA EST FROM CONCEPTION DATE: NORMAL D
GESTATIONAL AGE > 9:: YES
HGB A MFR BLD ELPH: 97.2 % (ref 96.4–98.8)
HGB A2 MFR BLD ELPH: 2.8 % (ref 1.8–3.2)
HGB F MFR BLD ELPH: 0 % (ref 0–2)
HGB FRACT BLD-IMP: NORMAL
HGB S MFR BLD ELPH: 0 %
HPV GENOTYPE REFLEX: NORMAL
HPV I/H RISK 4 DNA CVX QL PROBE+SIG AMP: NEGATIVE
LAB DIRECTOR NAME PROVIDER: NORMAL
LAB DIRECTOR NAME PROVIDER: NORMAL
LABORATORY COMMENT REPORT: NORMAL
LIMITATIONS OF THE TEST: NORMAL
Lab: NORMAL
N GONORRHOEA RRNA CVX QL NAA+PROBE: NEGATIVE
NEGATIVE PREDICTIVE VALUE: NORMAL
NOTE: NORMAL
OTHER STN SPEC: NORMAL
PERFORMANCE CHARACTERISTICS: NORMAL
POSITIVE PREDICTIVE VALUE: NORMAL
REF LAB TEST METHOD: NORMAL
STAT OF ADQ CVX/VAG CYTO-IMP: NORMAL
T VAGINALIS RRNA SPEC QL NAA+PROBE: NEGATIVE
TEST PERFORMANCE INFO SPEC: NORMAL
TRIOSOMY 16: NOT DETECTED
TRISOMY 22: NOT DETECTED

## 2024-03-26 ENCOUNTER — TELEPHONE (OUTPATIENT)
Dept: OBSTETRICS AND GYNECOLOGY | Facility: CLINIC | Age: 29
End: 2024-03-26
Payer: COMMERCIAL

## 2024-03-26 NOTE — TELEPHONE ENCOUNTER
----- Message from IESHA Bobby sent at 3/26/2024  9:59 AM EDT -----  Please let patient know her NIPS screen is negative, fetus is male if she wants to know gender.

## 2024-03-28 ENCOUNTER — LAB (OUTPATIENT)
Dept: OBSTETRICS AND GYNECOLOGY | Facility: CLINIC | Age: 29
End: 2024-03-28
Payer: COMMERCIAL

## 2024-03-28 DIAGNOSIS — Z13.1 SCREENING FOR DIABETES MELLITUS: Primary | ICD-10-CM

## 2024-03-28 LAB — GLUCOSE 1H P GLC SERPL-MCNC: 122 MG/DL (ref 65–139)

## 2024-03-28 PROCEDURE — 82950 GLUCOSE TEST: CPT | Performed by: NURSE PRACTITIONER

## 2024-04-11 ENCOUNTER — HOSPITAL ENCOUNTER (OUTPATIENT)
Dept: ULTRASOUND IMAGING | Facility: HOSPITAL | Age: 29
Discharge: HOME | End: 2024-04-11
Payer: COMMERCIAL

## 2024-04-11 DIAGNOSIS — Z34.80 SUPERVISION OF OTHER NORMAL PREGNANCY, ANTEPARTUM: ICD-10-CM

## 2024-04-11 PROCEDURE — 76801 OB US < 14 WKS SINGLE FETUS: CPT

## 2024-04-12 ENCOUNTER — TELEPHONE (OUTPATIENT)
Dept: OBSTETRICS AND GYNECOLOGY | Facility: CLINIC | Age: 29
End: 2024-04-12
Payer: COMMERCIAL

## 2024-04-12 NOTE — TELEPHONE ENCOUNTER
----- Message from IESHA Bobby sent at 4/11/2024  5:04 PM EDT -----  Please let patient know her ultrasound show a living pregnancy which measured 12w5d, confirming her ARI of 10/20/24.  Heart rate was 155.  Recommend she keep her next scheduled appointment.

## 2024-04-15 ENCOUNTER — HOSPITAL ENCOUNTER (EMERGENCY)
Facility: HOSPITAL | Age: 29
Discharge: PSYCHIATRIC OR SUBSTANCE USE FACILITY | DRG: 832 | End: 2024-04-15
Attending: EMERGENCY MEDICINE | Admitting: EMERGENCY MEDICINE
Payer: COMMERCIAL

## 2024-04-15 ENCOUNTER — HOSPITAL ENCOUNTER (INPATIENT)
Facility: HOSPITAL | Age: 29
LOS: 3 days | Discharge: HOME | DRG: 832 | End: 2024-04-18
Attending: PSYCHIATRY & NEUROLOGY | Admitting: PSYCHIATRY & NEUROLOGY
Payer: COMMERCIAL

## 2024-04-15 VITALS
HEART RATE: 85 BPM | BODY MASS INDEX: 37.98 KG/M2 | HEIGHT: 66 IN | TEMPERATURE: 98.4 F | OXYGEN SATURATION: 97 % | WEIGHT: 236.33 LBS | SYSTOLIC BLOOD PRESSURE: 119 MMHG | RESPIRATION RATE: 18 BRPM | DIASTOLIC BLOOD PRESSURE: 72 MMHG

## 2024-04-15 DIAGNOSIS — R45.851 SUICIDAL IDEATION: Primary | ICD-10-CM

## 2024-04-15 DIAGNOSIS — T14.91XA SUICIDE ATTEMPT: ICD-10-CM

## 2024-04-15 PROBLEM — F32.A DEPRESSION: Status: ACTIVE | Noted: 2024-04-15

## 2024-04-15 LAB
ALBUMIN SERPL-MCNC: 4.2 G/DL (ref 3.5–5.2)
ALBUMIN/GLOB SERPL: 1.4 G/DL
ALP SERPL-CCNC: 43 U/L (ref 39–117)
ALT SERPL W P-5'-P-CCNC: 12 U/L (ref 1–33)
AMPHET+METHAMPHET UR QL: NEGATIVE
ANION GAP SERPL CALCULATED.3IONS-SCNC: 12.7 MMOL/L (ref 5–15)
APAP SERPL-MCNC: <5 MCG/ML (ref 0–30)
AST SERPL-CCNC: 15 U/L (ref 1–32)
B-HCG UR QL: POSITIVE
BARBITURATES UR QL SCN: NEGATIVE
BASOPHILS # BLD AUTO: 0.03 10*3/MM3 (ref 0–0.2)
BASOPHILS NFR BLD AUTO: 0.3 % (ref 0–1.5)
BENZODIAZ UR QL SCN: NEGATIVE
BILIRUB SERPL-MCNC: 0.2 MG/DL (ref 0–1.2)
BUN SERPL-MCNC: 7 MG/DL (ref 6–20)
BUN/CREAT SERPL: 11.5 (ref 7–25)
CALCIUM SPEC-SCNC: 9.6 MG/DL (ref 8.6–10.5)
CANNABINOIDS SERPL QL: NEGATIVE
CHLORIDE SERPL-SCNC: 102 MMOL/L (ref 98–107)
CO2 SERPL-SCNC: 20.3 MMOL/L (ref 22–29)
COCAINE UR QL: NEGATIVE
CREAT SERPL-MCNC: 0.61 MG/DL (ref 0.57–1)
DEPRECATED RDW RBC AUTO: 40.6 FL (ref 37–54)
EGFRCR SERPLBLD CKD-EPI 2021: 123.5 ML/MIN/1.73
EOSINOPHIL # BLD AUTO: 0.07 10*3/MM3 (ref 0–0.4)
EOSINOPHIL NFR BLD AUTO: 0.6 % (ref 0.3–6.2)
ERYTHROCYTE [DISTWIDTH] IN BLOOD BY AUTOMATED COUNT: 12.5 % (ref 12.3–15.4)
ETHANOL BLD-MCNC: <10 MG/DL (ref 0–10)
ETHANOL UR QL: <0.01 %
FENTANYL UR-MCNC: NEGATIVE NG/ML
GLOBULIN UR ELPH-MCNC: 3.1 GM/DL
GLUCOSE SERPL-MCNC: 95 MG/DL (ref 65–99)
HCT VFR BLD AUTO: 38.4 % (ref 34–46.6)
HGB BLD-MCNC: 13.1 G/DL (ref 12–15.9)
HOLD SPECIMEN: NORMAL
HOLD SPECIMEN: NORMAL
IMM GRANULOCYTES # BLD AUTO: 0.08 10*3/MM3 (ref 0–0.05)
IMM GRANULOCYTES NFR BLD AUTO: 0.7 % (ref 0–0.5)
LYMPHOCYTES # BLD AUTO: 2.05 10*3/MM3 (ref 0.7–3.1)
LYMPHOCYTES NFR BLD AUTO: 18.8 % (ref 19.6–45.3)
MCH RBC QN AUTO: 30.6 PG (ref 26.6–33)
MCHC RBC AUTO-ENTMCNC: 34.1 G/DL (ref 31.5–35.7)
MCV RBC AUTO: 89.7 FL (ref 79–97)
METHADONE UR QL SCN: NEGATIVE
MONOCYTES # BLD AUTO: 0.78 10*3/MM3 (ref 0.1–0.9)
MONOCYTES NFR BLD AUTO: 7.1 % (ref 5–12)
NEUTROPHILS NFR BLD AUTO: 7.9 10*3/MM3 (ref 1.7–7)
NEUTROPHILS NFR BLD AUTO: 72.5 % (ref 42.7–76)
NRBC BLD AUTO-RTO: 0 /100 WBC (ref 0–0.2)
OPIATES UR QL: NEGATIVE
OXYCODONE UR QL SCN: NEGATIVE
PLATELET # BLD AUTO: 246 10*3/MM3 (ref 140–450)
PMV BLD AUTO: 11 FL (ref 6–12)
POTASSIUM SERPL-SCNC: 3.6 MMOL/L (ref 3.5–5.2)
PROT SERPL-MCNC: 7.3 G/DL (ref 6–8.5)
RBC # BLD AUTO: 4.28 10*6/MM3 (ref 3.77–5.28)
SALICYLATES SERPL-MCNC: <0.3 MG/DL
SODIUM SERPL-SCNC: 135 MMOL/L (ref 136–145)
WBC NRBC COR # BLD AUTO: 10.91 10*3/MM3 (ref 3.4–10.8)
WHOLE BLOOD HOLD COAG: NORMAL
WHOLE BLOOD HOLD SPECIMEN: NORMAL

## 2024-04-15 PROCEDURE — 82077 ASSAY SPEC XCP UR&BREATH IA: CPT | Performed by: EMERGENCY MEDICINE

## 2024-04-15 PROCEDURE — 81025 URINE PREGNANCY TEST: CPT | Performed by: PSYCHIATRY & NEUROLOGY

## 2024-04-15 PROCEDURE — 80307 DRUG TEST PRSMV CHEM ANLYZR: CPT | Performed by: EMERGENCY MEDICINE

## 2024-04-15 PROCEDURE — 80053 COMPREHEN METABOLIC PANEL: CPT | Performed by: EMERGENCY MEDICINE

## 2024-04-15 PROCEDURE — 80179 DRUG ASSAY SALICYLATE: CPT | Performed by: EMERGENCY MEDICINE

## 2024-04-15 PROCEDURE — 36415 COLL VENOUS BLD VENIPUNCTURE: CPT

## 2024-04-15 PROCEDURE — 80143 DRUG ASSAY ACETAMINOPHEN: CPT | Performed by: EMERGENCY MEDICINE

## 2024-04-15 PROCEDURE — 99285 EMERGENCY DEPT VISIT HI MDM: CPT

## 2024-04-15 PROCEDURE — 85025 COMPLETE CBC W/AUTO DIFF WBC: CPT | Performed by: EMERGENCY MEDICINE

## 2024-04-15 RX ORDER — LOPERAMIDE HYDROCHLORIDE 2 MG/1
2 CAPSULE ORAL
Status: DISCONTINUED | OUTPATIENT
Start: 2024-04-15 | End: 2024-04-18 | Stop reason: HOSPADM

## 2024-04-15 RX ORDER — ACETAMINOPHEN 325 MG/1
650 TABLET ORAL EVERY 4 HOURS PRN
Status: DISCONTINUED | OUTPATIENT
Start: 2024-04-15 | End: 2024-04-18 | Stop reason: HOSPADM

## 2024-04-15 RX ORDER — SODIUM CHLORIDE 0.9 % (FLUSH) 0.9 %
10 SYRINGE (ML) INJECTION AS NEEDED
Status: DISCONTINUED | OUTPATIENT
Start: 2024-04-15 | End: 2024-04-15 | Stop reason: HOSPADM

## 2024-04-15 RX ORDER — NICOTINE 21 MG/24HR
1 PATCH, TRANSDERMAL 24 HOURS TRANSDERMAL DAILY PRN
Status: DISCONTINUED | OUTPATIENT
Start: 2024-04-15 | End: 2024-04-18 | Stop reason: HOSPADM

## 2024-04-15 RX ORDER — ALUMINA, MAGNESIA, AND SIMETHICONE 2400; 2400; 240 MG/30ML; MG/30ML; MG/30ML
15 SUSPENSION ORAL EVERY 6 HOURS PRN
Status: DISCONTINUED | OUTPATIENT
Start: 2024-04-15 | End: 2024-04-18 | Stop reason: HOSPADM

## 2024-04-15 RX ORDER — HYDROXYZINE PAMOATE 50 MG/1
50 CAPSULE ORAL EVERY 6 HOURS PRN
Status: DISCONTINUED | OUTPATIENT
Start: 2024-04-15 | End: 2024-04-18 | Stop reason: HOSPADM

## 2024-04-15 RX ADMIN — HYDROXYZINE PAMOATE 50 MG: 50 CAPSULE ORAL at 22:35
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
## 2024-04-15 NOTE — ED TRIAGE NOTES
Via EMS, 13 weeks pregnant with 5th baby. 2 self inflicted abrasions to neck.  Previous history of self harm.  157/91  92  BG 81
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
## 2024-04-16 PROBLEM — F33.1 MAJOR DEPRESSIVE DISORDER, RECURRENT EPISODE, MODERATE: Status: ACTIVE | Noted: 2024-04-16

## 2024-04-16 PROBLEM — F43.10 POST TRAUMATIC STRESS DISORDER (PTSD): Status: ACTIVE | Noted: 2024-04-16

## 2024-04-16 PROBLEM — I10 PRIMARY HYPERTENSION: Status: ACTIVE | Noted: 2023-03-17

## 2024-04-16 PROBLEM — Z34.90 PREGNANCY: Status: ACTIVE | Noted: 2024-03-21

## 2024-04-16 PROBLEM — B18.2 CHRONIC VIRAL HEPATITIS C: Chronic | Status: ACTIVE | Noted: 2021-05-28

## 2024-04-16 PROBLEM — E78.5 HYPERLIPEMIA: Status: ACTIVE | Noted: 2024-04-16

## 2024-04-16 RX ORDER — BUSPIRONE HYDROCHLORIDE 10 MG/1
20 TABLET ORAL 3 TIMES DAILY
Status: DISCONTINUED | OUTPATIENT
Start: 2024-04-16 | End: 2024-04-18 | Stop reason: HOSPADM

## 2024-04-16 RX ORDER — PRENATAL VIT/IRON FUM/FOLIC AC 27MG-0.8MG
1 TABLET ORAL DAILY
Status: DISCONTINUED | OUTPATIENT
Start: 2024-04-16 | End: 2024-04-18 | Stop reason: HOSPADM

## 2024-04-16 RX ORDER — PRAZOSIN HYDROCHLORIDE 1 MG/1
2 CAPSULE ORAL NIGHTLY
Status: DISCONTINUED | OUTPATIENT
Start: 2024-04-16 | End: 2024-04-18 | Stop reason: HOSPADM

## 2024-04-16 RX ORDER — FLUOXETINE HYDROCHLORIDE 20 MG/1
40 CAPSULE ORAL DAILY
Status: DISCONTINUED | OUTPATIENT
Start: 2024-04-16 | End: 2024-04-17

## 2024-04-16 RX ADMIN — FLUOXETINE HYDROCHLORIDE 40 MG: 20 CAPSULE ORAL at 11:47

## 2024-04-16 RX ADMIN — VITAMIN A, VITAMIN C, VITAMIN D, VITAMIN E, THIAMINE, RIBOFLAVIN, NIACIN, VITAMIN B6, FOLIC ACID, VITAMIN B12, CALCIUM, IRON, ZINC, COPPER 1 TABLET: 4000; 120; 400; 22; 1.84; 3; 20; 10; 1; 12; 200; 27; 25; 2 TABLET ORAL at 11:47

## 2024-04-16 RX ADMIN — BUSPIRONE HYDROCHLORIDE 20 MG: 10 TABLET ORAL at 11:48

## 2024-04-16 RX ADMIN — BUSPIRONE HYDROCHLORIDE 20 MG: 10 TABLET ORAL at 16:55

## 2024-04-16 RX ADMIN — PRAZOSIN HYDROCHLORIDE 2 MG: 1 CAPSULE ORAL at 20:46

## 2024-04-16 RX ADMIN — BUSPIRONE HYDROCHLORIDE 20 MG: 10 TABLET ORAL at 20:46

## 2024-04-16 RX ADMIN — ACETAMINOPHEN 650 MG: 325 TABLET ORAL at 06:36
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
## 2024-04-16 NOTE — PLAN OF CARE
Goal Outcome Evaluation:  Plan of Care Reviewed With: patient  Patient Agreement with Plan of Care: agrees           Patient calm and cooperative. Denies si/hi/avh. Rates anxiety 5, depression 7. Participating in group sessions. Compliant with medications.

## 2024-04-17 ENCOUNTER — TELEPHONE (OUTPATIENT)
Dept: OBSTETRICS AND GYNECOLOGY | Facility: CLINIC | Age: 29
End: 2024-04-17

## 2024-04-17 RX ORDER — FLUOXETINE HYDROCHLORIDE 20 MG/1
20 CAPSULE ORAL ONCE
Status: COMPLETED | OUTPATIENT
Start: 2024-04-17 | End: 2024-04-17

## 2024-04-17 RX ORDER — FLUOXETINE HYDROCHLORIDE 20 MG/1
60 CAPSULE ORAL DAILY
Status: DISCONTINUED | OUTPATIENT
Start: 2024-04-18 | End: 2024-04-18 | Stop reason: HOSPADM

## 2024-04-17 RX ADMIN — BUSPIRONE HYDROCHLORIDE 20 MG: 10 TABLET ORAL at 08:49

## 2024-04-17 RX ADMIN — PRAZOSIN HYDROCHLORIDE 2 MG: 1 CAPSULE ORAL at 20:41

## 2024-04-17 RX ADMIN — BUSPIRONE HYDROCHLORIDE 20 MG: 10 TABLET ORAL at 20:41

## 2024-04-17 RX ADMIN — VITAMIN A, VITAMIN C, VITAMIN D, VITAMIN E, THIAMINE, RIBOFLAVIN, NIACIN, VITAMIN B6, FOLIC ACID, VITAMIN B12, CALCIUM, IRON, ZINC, COPPER 1 TABLET: 4000; 120; 400; 22; 1.84; 3; 20; 10; 1; 12; 200; 27; 25; 2 TABLET ORAL at 08:48

## 2024-04-17 RX ADMIN — FLUOXETINE HYDROCHLORIDE 20 MG: 20 CAPSULE ORAL at 11:53

## 2024-04-17 RX ADMIN — FLUOXETINE HYDROCHLORIDE 40 MG: 20 CAPSULE ORAL at 08:49

## 2024-04-17 RX ADMIN — BUSPIRONE HYDROCHLORIDE 20 MG: 10 TABLET ORAL at 17:11

## 2024-04-17 NOTE — PROGRESS NOTES
" [facility]     Psychiatric Progress Note    Patient Name: Abbe Amos  : 1995  MRN: [id number]  Primary Care Physician:  Babs Muñoz PA-C  Date of admission: 4/15/2024    Subjective   Subjective     Patient seen and chart reviewed, discussed with staff.    Chief Complaint: Depression      HPI:     Staff jordy the patient rated anxiety as a 3 and depression as a 6.  Asked for pastoral care consult.  Denying suicidal homicidal ideation as well as hallucinations.  Slept well.  Had some tearfulness morning.    Patient days, cooperative.  She reports that she is feeling a little down this morning because she feels \"stuck.\"  Continues to ruminate about what father of her child said to her the other day.  We discussed constructing counter narrative when people say negative things to her or about her.  Reports her trauma history always plays a part in how she except for the people say.  She reports that she is depressed but feeling a little better.  Acknowledges that she often questions her self worth.    Discussed medications she would like an increase in the fluoxetine.      Objective   Objective     Vitals:   Temp:  [98.4 °F (36.9 °C)-98.6 °F (37 °C)] 98.4 °F (36.9 °C)  Heart Rate:  [84] 84  Resp:  [20] 20  BP: (113-115)/(61-70) 113/70          Mental Status Exam:      Appearance:   Calm, cooperative, well-developed, well-nourished  Reliability:   Good  Eye Contact:   Good  Concentration/Focus:    Attentive to the interview  Behaviors:    No restlessness or agitation   Memory :    Intact  Speech:    Normal rate and volume  Language:   Appropriate, relevant  Mood :    \"Little down\"  Affect:    Congruent with stated mood  Thought process:    Linear  Thought Content:    No suicidal ideation, no hallucinations  Insight:   Good  Judgement:    Intact      Result Review    Result Review:  I have personally reviewed the results from the time of this admission to 2024 11:42 EDT and agree with these " findings:  []  Laboratory  []  Microbiology  []  Radiology  []  EKG/Telemetry   []  Cardiology/Vascular   []  Pathology  []  Old records  []  Other:  Most notable findings include:     Lab Results (last 24 hours)       ** No results found for the last 24 hours. **                Medications:   busPIRone, 20 mg, Oral, TID  FLUoxetine, 20 mg, Oral, Once  [START ON 4/18/2024] FLUoxetine, 60 mg, Oral, Daily  prazosin, 2 mg, Oral, Nightly  prenatal vitamin, 1 tablet, Oral, Daily          Assessment / Plan       Active Hospital Problems:  Active Hospital Problems    Diagnosis     Major depressive disorder, recurrent episode, moderate     Post traumatic stress disorder (PTSD)     Pregnancy     Body mass index (BMI) of 39.0-39.9 in adult        Plan:     Increase fluoxetine to 60 mg  Work on counter narratives for negative thoughts and negative things people say to her  Work on mood stabilization and abatement of any suicidal ideation or psychosis.  Work on appropriate safety plan  Continue supportive therapy  Patient to engage in all group and individual treatment modalities available on the unit  Obtain collateral information if possible  Titrate medications as clinically indicated  Work on appropriate disposition follow-up including referrals to substance abuse treatment if indicated      Disposition:  I expect patient to be discharged 1 to 2 days.    Part of this note may be an electronic transcription/translation of spoken language to printed text using the Dragon dictation system.         Electronically signed by Ezio Roberto MD, 04/17/24, 11:42 AM EDT.

## 2024-04-17 NOTE — PLAN OF CARE
"Goal Outcome Evaluation:  Plan of Care Reviewed With: patient  Patient Agreement with Plan of Care: agrees   Pt interacts with staff and peers appropriately. Pt stated that before she came to the hospital she had an \"episode.\" Reported that she made sure her daughter was at a safe place with the family that adopted her other daughter. Pt stated that they are a great support for her and she knows her children are safe there. Pt fearful about future with boyfriend and children, about how they will reconcile after admission. Pt rated anxiety 3, depression 6; denied HI, SI, AVH. Pt reported intrusive thoughts to self harm but no intent; contracted for safety. Pt reported that she was unsure of why she was depressed stating she thought it may be being tired, hormones from pregnancy with 6th child, and/or a combination of feeling overwhelmed. Pt ate snack, med compliant. Mood is stable at this time.                                      "
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
## 2024-04-17 NOTE — TELEPHONE ENCOUNTER
Caller: Abbe Amos    Relationship to patient: Self    Best call back number: [phone]    Type of visit: OB F/U    If rescheduling, when is the original appointment: 4/17/23     Additional notes:PT GIO.S TODAYS APPT DUE TO BEING IN THE HOSPITAL SHE DID R/S FOR 4/24/24

## 2024-04-18 VITALS
HEART RATE: 80 BPM | HEIGHT: 66 IN | OXYGEN SATURATION: 100 % | WEIGHT: 236.33 LBS | SYSTOLIC BLOOD PRESSURE: 120 MMHG | DIASTOLIC BLOOD PRESSURE: 52 MMHG | RESPIRATION RATE: 16 BRPM | TEMPERATURE: 97.9 F | BODY MASS INDEX: 37.98 KG/M2

## 2024-04-18 RX ORDER — FLUOXETINE HYDROCHLORIDE 20 MG/1
60 CAPSULE ORAL DAILY
Qty: 90 CAPSULE | Refills: 1 | Status: SHIPPED | OUTPATIENT
Start: 2024-04-19

## 2024-04-18 RX ADMIN — BUSPIRONE HYDROCHLORIDE 20 MG: 10 TABLET ORAL at 08:51

## 2024-04-18 RX ADMIN — FLUOXETINE HYDROCHLORIDE 60 MG: 20 CAPSULE ORAL at 08:50

## 2024-04-18 RX ADMIN — HYDROXYZINE PAMOATE 50 MG: 50 CAPSULE ORAL at 03:45

## 2024-04-18 RX ADMIN — VITAMIN A, VITAMIN C, VITAMIN D, VITAMIN E, THIAMINE, RIBOFLAVIN, NIACIN, VITAMIN B6, FOLIC ACID, VITAMIN B12, CALCIUM, IRON, ZINC, COPPER 1 TABLET: 4000; 120; 400; 22; 1.84; 3; 20; 10; 1; 12; 200; 27; 25; 2 TABLET ORAL at 08:50

## 2024-04-18 NOTE — PLAN OF CARE
Goal Outcome Evaluation:  Plan of Care Reviewed With: patient  Patient Agreement with Plan of Care: agrees   Pt mood elevated, jovial. Pt stated that she feels better with medication adjustment today. Pt rated anxiety and depression 3/10. Denied SI, HI, AVH. Pt up to dayroom completing puzzle with peer. Med compliant.
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
## 2024-04-18 NOTE — PLAN OF CARE
Goal Outcome Evaluation:  Plan of Care Reviewed With: patient  Patient Agreement with Plan of Care: agrees   Patient has reached all goals and will be discharged from unit. Patient to continue treatment on outpatient basis.

## 2024-04-22 ENCOUNTER — REFERRAL TRIAGE (OUTPATIENT)
Dept: LABOR AND DELIVERY | Facility: HOSPITAL | Age: 29
End: 2024-04-22
Payer: COMMERCIAL

## 2024-04-24 ENCOUNTER — ROUTINE PRENATAL (OUTPATIENT)
Dept: OBSTETRICS AND GYNECOLOGY | Facility: CLINIC | Age: 29
End: 2024-04-24
Payer: COMMERCIAL

## 2024-04-24 VITALS — SYSTOLIC BLOOD PRESSURE: 134 MMHG | DIASTOLIC BLOOD PRESSURE: 84 MMHG | BODY MASS INDEX: 38.41 KG/M2 | WEIGHT: 238 LBS

## 2024-04-24 DIAGNOSIS — I10 PRIMARY HYPERTENSION: ICD-10-CM

## 2024-04-24 DIAGNOSIS — Z34.80 SUPERVISION OF OTHER NORMAL PREGNANCY, ANTEPARTUM: Primary | ICD-10-CM

## 2024-04-24 DIAGNOSIS — O09.899 HX OF PRETERM DELIVERY, CURRENTLY PREGNANT: ICD-10-CM

## 2024-04-24 DIAGNOSIS — F32.A DEPRESSION, UNSPECIFIED DEPRESSION TYPE: ICD-10-CM

## 2024-04-24 LAB
GLUCOSE UR STRIP-MCNC: NEGATIVE MG/DL
PROT UR STRIP-MCNC: NEGATIVE MG/DL

## 2024-04-24 RX ORDER — ASPIRIN 81 MG/1
81 TABLET ORAL DAILY
Qty: 90 TABLET | Refills: 1 | Status: SHIPPED | OUTPATIENT
Start: 2024-04-24

## 2024-04-24 NOTE — PROGRESS NOTES
OB FOLLOW UP        Chief Complaint   Patient presents with    Routine Prenatal Visit       Subjective:   No complaints    Objective:  /84   Wt 108 kg (238 lb)   LMP 2024   BMI 38.41 kg/m²   Uterine Size: size equals dates, below umbilicus  FHT: 110-160 BPM    See OB flow for LE edema, cvx exam if performed, and Upro/Uglu    Assessment and Plan:  14w3d  Reassuring pregnancy progress.  Questions answered.  Diagnoses and all orders for this visit:    1. Supervision of other normal pregnancy, antepartum (Primary)  Overview:  ARI finalized: 10/20/24 per LMP, 12-week US and ACOG    Optional testing NIPS,CF/SMA,AFP:  NIPS negative, considering CF/SMA    COVID: Recommended 3/21/24  Flu: Recommended 3/21/24  Tdap:  RSV:    Rhogam:  28-32 weeks repeat TPA:  ? Desires Sterilization:    Anatomy US:  FU US:    PROBLEM LIST/PLAN:   Maternal obesity -  early 1hGTT 122    Anxiety/depression - stable on current medications, R/B reviewed    Chronic Hep C - recently finished treatment, doing well    Assessment & Plan:  Doing well, no complaints.    was hospitalized last week for her mental health, feeling better.  Will schedule CL ultrasound and anatomy scan  Second trimester precautions.     Orders:  -     POC Urinalysis Dipstick  -     US Ob Detail Fetal Anatomy Single or First Gestation; Future    2. Hx of  delivery, currently pregnant  Overview:  With G2, SROM at 36 weeks    Assessment & Plan:  Discussed cervical length US, patient desires.   has taken Stephanie previously    Orders:  -     US Ob Transvaginal; Future    3. Primary hypertension  Overview:  Last Assessment & Plan:    Formatting of this note might be different from the original.   Condition: stable      Discussed target blood pressure.       Continue medication as prescribed from PCP/specialist.  Take medications at the same time every day.  Lifestyle modification advised: DASH diet, reduce stress/anxiety, discussed health weight  management, activity as tolerated or advised from PCP, try to avoid alcohol and nicotine.        Follow up in: six months    Assessment & Plan:  No current medications, will start ASA 81 mg    Orders:  -     aspirin 81 MG EC tablet; Take 1 tablet by mouth Daily. Start daily after 12weeks gestation and continue until 7-10 days before expected delivery  Dispense: 90 tablet; Refill: 1    4. Depression, unspecified depression type  Assessment & Plan:  Hospitalized last week, medications were adjusted.  Feeling much better today.         Counseling:    Second trimester precautions  Continue PNV.  Importance of healthy eating and exercise.    Return in about 2 weeks (around 5/8/2024) for [facility], OB follow up, cervical length US.          Pawel Mayes, APRN  04/24/2024    [facility] [address]  [facility]  [address] [address]  Dept: [phone]  Dept Fax: [phone]  Loc: [phone]

## 2024-04-24 NOTE — ASSESSMENT & PLAN NOTE
Doing well, no complaints.   States was hospitalized last week for her mental health, feeling better.  Will schedule CL ultrasound and anatomy scan  Second trimester precautions.

## 2024-05-09 ENCOUNTER — ROUTINE PRENATAL (OUTPATIENT)
Dept: OBSTETRICS AND GYNECOLOGY | Facility: CLINIC | Age: 29
End: 2024-05-09
Payer: COMMERCIAL

## 2024-05-09 VITALS — SYSTOLIC BLOOD PRESSURE: 112 MMHG | WEIGHT: 234 LBS | DIASTOLIC BLOOD PRESSURE: 75 MMHG | BODY MASS INDEX: 37.77 KG/M2

## 2024-05-09 DIAGNOSIS — O09.899 HX OF PRETERM DELIVERY, CURRENTLY PREGNANT: ICD-10-CM

## 2024-05-09 DIAGNOSIS — Z34.80 SUPERVISION OF OTHER NORMAL PREGNANCY, ANTEPARTUM: Primary | ICD-10-CM

## 2024-05-09 LAB
GLUCOSE UR STRIP-MCNC: NEGATIVE MG/DL
PROT UR STRIP-MCNC: NEGATIVE MG/DL

## 2024-05-16 ENCOUNTER — TELEPHONE (OUTPATIENT)
Dept: OBSTETRICS AND GYNECOLOGY | Facility: CLINIC | Age: 29
End: 2024-05-16
Payer: COMMERCIAL

## 2024-05-16 NOTE — TELEPHONE ENCOUNTER
Provider: DR.HENDERSON    Caller: MELANIA ALFARO    Relationship to Patient: SELF    Pharmacy: [facility] [address]    Phone Number: [phone]    Reason for Call: PT HAS BEEN ITCHING/DISCHARGE FOR MOST OF THIS PAST MTH. SHE TRIED MONISTAT WHICH SEEMED TO RELIEVE IT FOR A BIT, BUT IT HAS RETURNED. ITCHING IS PRETTY SEVERE. NA AT CLINICAL    When was the patient last seen: 05-09-24

## 2024-06-03 ENCOUNTER — ROUTINE PRENATAL (OUTPATIENT)
Dept: OBSTETRICS AND GYNECOLOGY | Facility: CLINIC | Age: 29
End: 2024-06-03
Payer: COMMERCIAL

## 2024-06-03 VITALS — DIASTOLIC BLOOD PRESSURE: 76 MMHG | WEIGHT: 233 LBS | BODY MASS INDEX: 37.61 KG/M2 | SYSTOLIC BLOOD PRESSURE: 108 MMHG

## 2024-06-03 DIAGNOSIS — Z34.80 SUPERVISION OF OTHER NORMAL PREGNANCY, ANTEPARTUM: Primary | ICD-10-CM

## 2024-06-03 LAB
GLUCOSE UR STRIP-MCNC: NEGATIVE MG/DL
PROT UR STRIP-MCNC: ABNORMAL MG/DL

## 2024-06-03 PROCEDURE — 99213 OFFICE O/P EST LOW 20 MIN: CPT | Performed by: OBSTETRICS & GYNECOLOGY

## 2024-06-03 NOTE — PROGRESS NOTES
OB FOLLOW UP  CC- Here for care of pregnancy        Abbe Amos is a 29 y.o.  20w1d patient being seen today for her obstetrical follow up visit. Patient reports no complaints.     Her prenatal care is complicated by (and status) :    Patient Active Problem List   Diagnosis    History of methamphetamine abuse    History of self-harm    Generalized anxiety disorder    Hidradenitis suppurativa    Cigarette nicotine dependence without complication    Major depressive disorder, recurrent episode, moderate degree    Bilateral hand numbness    Carpal tunnel syndrome of right wrist    Irregular menses    Elevated blood pressure reading without diagnosis of hypertension    Annual physical exam    Supervision of other normal pregnancy, antepartum    Obesity affecting pregnancy, antepartum    Chronic hepatitis C complicating pregnancy, antepartum    Hx of  delivery, currently pregnant    Depression    Body mass index (BMI) of 39.0-39.9 in adult    Hyperlipemia    Chronic viral hepatitis C    Primary hypertension    Major depressive disorder, recurrent episode, moderate    Post traumatic stress disorder (PTSD)       Flu Status:   Covid Status:    ROS -   Patient Reports : No Problems  Patient Denies: Loss of Fluid and Vaginal Spotting  Fetal Movement : normal  All other systems reviewed and are negative.       The additional following portions of the patient's history were reviewed and updated as appropriate: allergies, current medications, past family history, past medical history, past social history, past surgical history, and problem list.    I have reviewed and agree with the HPI, ROS, and historical information as entered above. Jennifer Cheatham, DO    /76   Wt 106 kg (233 lb)   LMP 2024   BMI 37.61 kg/m²       EXAM:     FHT: 136 BPM   Uterine Size: size equals dates  Pelvic Exam: No    Urine glucose/protein: See prenatal flowsheet       Assessment and Plan  Diagnoses and all orders for this  visit:    1. Supervision of other normal pregnancy, antepartum (Primary)  Overview:  ARI finalized: 10/20/24 per LMP, 12-week US and ACOG    Optional testing NIPS,CF/SMA,AFP:  NIPS negative, considering CF/SMA    COVID: Recommended 3/21/24  Flu: Recommended 3/21/24  Tdap:  RSV:    Rhogam:  28-32 weeks repeat TPA:  ? Desires Sterilization:    Anatomy US:6/3/24:ARI confirmed.  +cardiac activity @ 136. Incomplete heart views otherwise all other anatomy appears nml. Anterior grade 1 placenta. Cervix 3.9 no funneling seen  FU US:    PROBLEM LIST/PLAN:   Maternal obesity -  early 1hGTT 122    Anxiety/depression - stable on current medications, R/B reviewed    Chronic Hep C - recently finished treatment, doing well    Orders:  -     POC Urinalysis Dipstick  -     US Ob Transvaginal; Standing         Pregnancy at 20w1d  Fetal status reassuring.   Activity and Exercise discussed.  Return in about 2 weeks (around 6/17/2024) for appt for sono 2&4 wks but needs full sono in 4 wks to ck heart order placed.    Jennifer Cheatham, DO  06/03/2024

## 2024-06-17 ENCOUNTER — TELEPHONE (OUTPATIENT)
Dept: OBSTETRICS AND GYNECOLOGY | Facility: CLINIC | Age: 29
End: 2024-06-17
Payer: COMMERCIAL

## 2024-07-01 ENCOUNTER — ROUTINE PRENATAL (OUTPATIENT)
Dept: OBSTETRICS AND GYNECOLOGY | Facility: CLINIC | Age: 29
End: 2024-07-01
Payer: COMMERCIAL

## 2024-07-01 VITALS
TEMPERATURE: 97.4 F | DIASTOLIC BLOOD PRESSURE: 68 MMHG | SYSTOLIC BLOOD PRESSURE: 112 MMHG | BODY MASS INDEX: 38.03 KG/M2 | RESPIRATION RATE: 16 BRPM | WEIGHT: 235.6 LBS | HEART RATE: 95 BPM

## 2024-07-01 DIAGNOSIS — O09.899 HX OF PRETERM DELIVERY, CURRENTLY PREGNANT: ICD-10-CM

## 2024-07-01 DIAGNOSIS — Z34.80 SUPERVISION OF OTHER NORMAL PREGNANCY, ANTEPARTUM: Primary | ICD-10-CM

## 2024-07-01 LAB
DEPRECATED RDW RBC AUTO: 41.9 FL (ref 37–54)
ERYTHROCYTE [DISTWIDTH] IN BLOOD BY AUTOMATED COUNT: 12.2 % (ref 12.3–15.4)
GLUCOSE 1H P GLC SERPL-MCNC: 107 MG/DL (ref 65–139)
GLUCOSE UR STRIP-MCNC: NEGATIVE MG/DL
HCT VFR BLD AUTO: 36.4 % (ref 34–46.6)
HGB BLD-MCNC: 12.1 G/DL (ref 12–15.9)
LEUKOCYTE EST, POC: ABNORMAL
MCH RBC QN AUTO: 31.7 PG (ref 26.6–33)
MCHC RBC AUTO-ENTMCNC: 33.2 G/DL (ref 31.5–35.7)
MCV RBC AUTO: 95.3 FL (ref 79–97)
NITRITE UR-MCNC: NEGATIVE MG/ML
PLATELET # BLD AUTO: 240 10*3/MM3 (ref 140–450)
PMV BLD AUTO: 10.9 FL (ref 6–12)
PROT UR STRIP-MCNC: ABNORMAL MG/DL
RBC # BLD AUTO: 3.82 10*6/MM3 (ref 3.77–5.28)
WBC NRBC COR # BLD AUTO: 8.85 10*3/MM3 (ref 3.4–10.8)

## 2024-07-01 PROCEDURE — 99213 OFFICE O/P EST LOW 20 MIN: CPT | Performed by: NURSE PRACTITIONER

## 2024-07-01 PROCEDURE — 82950 GLUCOSE TEST: CPT | Performed by: NURSE PRACTITIONER

## 2024-07-01 PROCEDURE — 85027 COMPLETE CBC AUTOMATED: CPT | Performed by: NURSE PRACTITIONER

## 2024-07-01 NOTE — ASSESSMENT & PLAN NOTE
Doing well, no complaints  1hGTT/CBC today  Reviewed F/U ultrasound, no further CL scans needed  Fetal kick counts   labor precautions

## 2024-07-01 NOTE — PROGRESS NOTES
OB FOLLOW UP        Chief Complaint   Patient presents with    Follow-up       Subjective:   No complaints    Objective:  /68   Pulse 95   Temp 97.4 °F (36.3 °C) (Temporal)   Resp 16   Wt 107 kg (235 lb 9.6 oz)   LMP 2024   BMI 38.03 kg/m²   Uterine Size: size equals dates  FHT: 110-160 BPM    See OB flow for LE edema, cvx exam if performed, and Upro/Uglu    Assessment and Plan:  24w1d  Reassuring pregnancy progress.  Questions answered.  Diagnoses and all orders for this visit:    1. Supervision of other normal pregnancy, antepartum (Primary)  Overview:  ARI finalized: 10/20/24 per LMP, 12-week US and ACOG    Optional testing NIPS,CF/SMA,AFP:  NIPS negative, considering CF/SMA    COVID: Recommended 3/21/24  Flu: Recommended 3/21/24  Tdap:  RSV:    Rhogam:  28-32 weeks repeat TPA:  ? Desires Sterilization:    Anatomy US:6/3/24:ARI confirmed.  +cardiac activity @ 136. Incomplete heart views otherwise all other anatomy appears nml. Anterior grade 1 placenta. Cervix 3.9 no funneling seen  FU US: 24 all heart view WNL, CL 4.4 cm, no funneling    PROBLEM LIST/PLAN:   Maternal obesity -  early 1hGTT 122    Anxiety/depression - stable on current medications, R/B reviewed    Chronic Hep C - recently finished treatment, doing well    Assessment & Plan:  Doing well, no complaints  1hGTT/CBC today  Reviewed F/U ultrasound, no further CL scans needed  Fetal kick counts   labor precautions    Orders:  -     POC Urinalysis Dipstick  -     Gestational Diabetes Screen 1 Hour  -     CBC (No Diff)    2. Hx of  delivery, currently pregnant  Overview:  With G2, SROM at 36 weeks    Assessment & Plan:  CL 4.4 cm, no funneling.  No further scans needed        Counseling:    OB precautions, leaking, VB, leonor hobson vs PTL/Labor  FKC  Continue PNV.  Importance of healthy eating and exercise.    Return in about 4 weeks (around 2024) for [facility], OB follow up.          Pawel Mayes,  APRN  07/01/2024    [facility]  [facility]  [address] [place] [address]  Dept: [phone]  Dept Fax: [phone]  Loc: [phone]

## 2024-07-31 ENCOUNTER — ROUTINE PRENATAL (OUTPATIENT)
Dept: OBSTETRICS AND GYNECOLOGY | Facility: CLINIC | Age: 29
End: 2024-07-31
Payer: COMMERCIAL

## 2024-07-31 VITALS — SYSTOLIC BLOOD PRESSURE: 109 MMHG | BODY MASS INDEX: 37.61 KG/M2 | DIASTOLIC BLOOD PRESSURE: 78 MMHG | WEIGHT: 233 LBS

## 2024-07-31 DIAGNOSIS — F15.11 HISTORY OF METHAMPHETAMINE ABUSE: ICD-10-CM

## 2024-07-31 DIAGNOSIS — O98.419 CHRONIC HEPATITIS C COMPLICATING PREGNANCY, ANTEPARTUM: ICD-10-CM

## 2024-07-31 DIAGNOSIS — Z34.80 SUPERVISION OF OTHER NORMAL PREGNANCY, ANTEPARTUM: Primary | ICD-10-CM

## 2024-07-31 DIAGNOSIS — B18.2 CHRONIC HEPATITIS C WITHOUT HEPATIC COMA: Chronic | ICD-10-CM

## 2024-07-31 DIAGNOSIS — B18.2 CHRONIC HEPATITIS C COMPLICATING PREGNANCY, ANTEPARTUM: ICD-10-CM

## 2024-07-31 DIAGNOSIS — I10 PRIMARY HYPERTENSION: ICD-10-CM

## 2024-07-31 PROBLEM — Z00.00 ANNUAL PHYSICAL EXAM: Status: RESOLVED | Noted: 2023-08-31 | Resolved: 2024-07-31

## 2024-07-31 PROBLEM — N92.6 IRREGULAR MENSES: Status: RESOLVED | Noted: 2023-08-31 | Resolved: 2024-07-31

## 2024-07-31 LAB
ALBUMIN SERPL-MCNC: 3.9 G/DL (ref 3.5–5.2)
ALBUMIN/GLOB SERPL: 1.2 G/DL
ALP SERPL-CCNC: 61 U/L (ref 39–117)
ALT SERPL W P-5'-P-CCNC: 11 U/L (ref 1–33)
ANION GAP SERPL CALCULATED.3IONS-SCNC: 7.7 MMOL/L (ref 5–15)
AST SERPL-CCNC: 19 U/L (ref 1–32)
BILIRUB SERPL-MCNC: 0.2 MG/DL (ref 0–1.2)
BUN SERPL-MCNC: 5 MG/DL (ref 6–20)
BUN/CREAT SERPL: 8.3 (ref 7–25)
CALCIUM SPEC-SCNC: 9.4 MG/DL (ref 8.6–10.5)
CHLORIDE SERPL-SCNC: 106 MMOL/L (ref 98–107)
CO2 SERPL-SCNC: 20.3 MMOL/L (ref 22–29)
CREAT SERPL-MCNC: 0.6 MG/DL (ref 0.57–1)
CREAT UR-MCNC: 397 MG/DL
EGFRCR SERPLBLD CKD-EPI 2021: 124 ML/MIN/1.73
GLOBULIN UR ELPH-MCNC: 3.2 GM/DL
GLUCOSE SERPL-MCNC: 118 MG/DL (ref 65–99)
GLUCOSE UR STRIP-MCNC: NEGATIVE MG/DL
POTASSIUM SERPL-SCNC: 4.2 MMOL/L (ref 3.5–5.2)
PROT ?TM UR-MCNC: 25.7 MG/DL
PROT SERPL-MCNC: 7.1 G/DL (ref 6–8.5)
PROT UR STRIP-MCNC: ABNORMAL MG/DL
PROT/CREAT UR: 0.06 MG/G{CREAT}
SODIUM SERPL-SCNC: 134 MMOL/L (ref 136–145)

## 2024-07-31 PROCEDURE — 84156 ASSAY OF PROTEIN URINE: CPT | Performed by: STUDENT IN AN ORGANIZED HEALTH CARE EDUCATION/TRAINING PROGRAM

## 2024-07-31 PROCEDURE — 82570 ASSAY OF URINE CREATININE: CPT | Performed by: STUDENT IN AN ORGANIZED HEALTH CARE EDUCATION/TRAINING PROGRAM

## 2024-07-31 PROCEDURE — 87522 HEPATITIS C REVRS TRNSCRPJ: CPT | Performed by: STUDENT IN AN ORGANIZED HEALTH CARE EDUCATION/TRAINING PROGRAM

## 2024-07-31 PROCEDURE — 80053 COMPREHEN METABOLIC PANEL: CPT | Performed by: STUDENT IN AN ORGANIZED HEALTH CARE EDUCATION/TRAINING PROGRAM

## 2024-07-31 RX ORDER — FLUOXETINE HYDROCHLORIDE 20 MG/1
60 CAPSULE ORAL DAILY
Qty: 90 CAPSULE | Refills: 0 | Status: SHIPPED | OUTPATIENT
Start: 2024-07-31

## 2024-07-31 NOTE — PROGRESS NOTES
OB FOLLOW UP  Complaint   Chief Complaint   Patient presents with    Routine Prenatal Visit            Abbe Amos is a 29 y.o.  28w3d patient being seen today for her obstetrical follow up visit. Patient denies decreased fetal movement, contractions, loss of fluid or vaginal bleeding.  No acute complaints.  Compliant with baby aspirin.  Reports treatment for chronic hepatitis C prior to pregnancy.  Mood is stable on Prozac, prazosin and BuSpar.  Does not monitor blood pressures at home. Patient denies any headache, visual disturbances, new onset nausea vomiting, right upper quadrant pain, or new onset swelling.  Was never on blood pressure medications.      Her prenatal care is complicated by (and status) :    Patient Active Problem List   Diagnosis    History of methamphetamine abuse    History of self-harm    Generalized anxiety disorder    Hidradenitis suppurativa    Cigarette nicotine dependence without complication    Major depressive disorder, recurrent episode, moderate degree    Bilateral hand numbness    Carpal tunnel syndrome of right wrist    Elevated blood pressure reading without diagnosis of hypertension    Supervision of other normal pregnancy, antepartum    Obesity affecting pregnancy, antepartum    Chronic hepatitis C complicating pregnancy, antepartum    Hx of  delivery, currently pregnant    Depression    Body mass index (BMI) of 39.0-39.9 in adult    Hyperlipemia    Chronic viral hepatitis C    Primary hypertension    Major depressive disorder, recurrent episode, moderate    Post traumatic stress disorder (PTSD)       All other systems reviewed and are negative.     The additional following portions of the patient's history were reviewed and updated as appropriate: allergies, current medications, past family history, past medical history, past social history, past surgical history, and problem list.      EXAM:     Vital signs: /78   Wt 106 kg (233 lb)   LMP 2024    BMI 37.61 kg/m²   Appearance/psychiatric: To be in no distress  Constitutional: The patient is well nourished.  Cardiovascular: She does not have edema.  Respiratory: Respiratory effort is normal.  Gastrointestinal: Abdomen is soft, gravid, nontender, no rashes, heart tones are present, fundal height is size equals dates    Pelvic Exam: No    Urine glucose/protein: See prenatal flowsheet       Assessment and Plan    Problem List Items Addressed This Visit          Cardiac and Vasculature    Primary hypertension    Overview           Continue medication as prescribed from PCP/specialist.  Take medications at the same time every day.  Lifestyle modification advised: DASH diet, reduce stress/anxiety, discussed health weight management, activity as tolerated or advised from PCP, try to avoid alcohol and nicotine.       2024 Never on BP meds; CMP; UPCr         Relevant Medications    prazosin (MINIPRESS) 2 MG capsule    aspirin 81 MG EC tablet    Other Relevant Orders    Comprehensive Metabolic Panel    Protein / Creatinine Ratio, Urine - Urine, Clean Catch       Gastrointestinal Abdominal     Chronic viral hepatitis C (Chronic)    Overview     Formatting of this note might be different from the original.            Last Assessment & Plan:    Formatting of this note might be different from the original.   Condition: stable      Follow up in: six months           Relevant Orders    Hepatitis C RNA, Quantitative, PCR (graph)       Gravid and     Chronic hepatitis C complicating pregnancy, antepartum    Overview     Recently completed treatment, has follow up scheduled.  Doing well.         Supervision of other normal pregnancy, antepartum - Primary    Overview     ARI finalized: 10/20/24 per LMP, 12-week US and ACOG    Optional testing NIPS,CF/SMA,AFP:  NIPS negative, considering CF/SMA    COVID: Recommended 3/21/24  Flu: Recommended 3/21/24  Tdap: 2024 Rx   RSV:    28-32 weeks repeat TPA:  ? Desires  Sterilization: partner with vasectomy.     Anatomy US:6/3/24:ARI confirmed.  +cardiac activity @ 136. Incomplete heart views otherwise all other anatomy appears nml. Anterior grade 1 placenta. Cervix 3.9 no funneling seen  FU US: 7/1/24 all heart view WNL, CL 4.4 cm, no funneling    PROBLEM LIST/PLAN:   Maternal obesity -  early 1hGTT 122    Anxiety/depression - stable on current medications, R/B reviewed    Chronic Hep C - recently finished treatment, doing well         Relevant Orders    POC Urinalysis Dipstick (Completed)       Mental Health    History of methamphetamine abuse    Overview     7/31/2024 UDS negative at NO[facility] in March Impression  Pregnancy at 28w3d  Fetal status reassuring.   Activity and Exercise discussed.    Plan  Tdap prescription provided  Chronic hypertension in pregnancy -lab records reviewed with no CMP or urine protein creatinine ratio baseline.  This performed today.  Continue with baby aspirin.  Blood pressure parameters and preeclampsia precautions discussed.  Specifically patient was given symptoms of preeclampsia with blood pressures higher than 140/90.  Additionally patient to seek medical treatment if blood pressures higher than 160/110.  Spot checking blood pressures at home highly recommended.  Normotensive blood pressure today, trace protein in urine.  Increase fluid hydration.    History of chronic hepatitis C -hepatitis C quant viral load today  [email or web address] information provided, reports that partner's had a vasectomy  Return to office in 2 weeks      Patient was counseled to the following pregnancy precautions:  Decreased fetal movement, if concern for decreased fetal movement please perform fetal kick counts you are looking for 10 movements in 2 hours.  If concern for fetal movement and not meeting that criteria, please present to triage for evaluation.  Contractions occurring every 5 minutes for over an hour, lasting 30 to 60 seconds and progressively causing more  discomfort, please seek medical attention to rule out labor  If you believe that your water is broken, place a sanitary pad.  If pad fills in short period of time i.e. less than 5 minutes, take off pad placed another pad.  If this is saturated please present for rule out rupture of membranes  Vaginal bleeding can be normal in pregnancy, this usually takes a form of spotting.  If having heavier bleeding like a menstrual period please present for evaluation; especially in light of severe abdominal pain this could represent a placental abruption.  Keep all scheduled appointments as recommended.        Babar Leyva MD  07/31/2024

## 2024-08-02 NOTE — TELEPHONE ENCOUNTER
Please advise protocols failed    No active pregnancy on record    No positive pregnancy test in past 12 months

## 2024-08-05 LAB
HCV RNA SERPL NAA+PROBE-ACNC: NORMAL IU/ML
TEST INFORMATION: NORMAL

## 2024-08-05 RX ORDER — FLUOXETINE HYDROCHLORIDE 20 MG/1
60 CAPSULE ORAL DAILY
Qty: 90 CAPSULE | Refills: 1 | OUTPATIENT
Start: 2024-08-05

## 2024-08-14 ENCOUNTER — ROUTINE PRENATAL (OUTPATIENT)
Dept: OBSTETRICS AND GYNECOLOGY | Facility: CLINIC | Age: 29
End: 2024-08-14
Payer: COMMERCIAL

## 2024-08-14 VITALS — BODY MASS INDEX: 37.77 KG/M2 | SYSTOLIC BLOOD PRESSURE: 116 MMHG | DIASTOLIC BLOOD PRESSURE: 68 MMHG | WEIGHT: 234 LBS

## 2024-08-14 DIAGNOSIS — F32.A DEPRESSION, UNSPECIFIED DEPRESSION TYPE: ICD-10-CM

## 2024-08-14 DIAGNOSIS — F15.11 HISTORY OF METHAMPHETAMINE ABUSE: ICD-10-CM

## 2024-08-14 DIAGNOSIS — O99.210 OBESITY AFFECTING PREGNANCY, ANTEPARTUM, UNSPECIFIED OBESITY TYPE: ICD-10-CM

## 2024-08-14 DIAGNOSIS — B18.2 CHRONIC HEPATITIS C COMPLICATING PREGNANCY, ANTEPARTUM: ICD-10-CM

## 2024-08-14 DIAGNOSIS — F41.1 GENERALIZED ANXIETY DISORDER: ICD-10-CM

## 2024-08-14 DIAGNOSIS — I10 PRIMARY HYPERTENSION: ICD-10-CM

## 2024-08-14 DIAGNOSIS — O09.899 HX OF PRETERM DELIVERY, CURRENTLY PREGNANT: ICD-10-CM

## 2024-08-14 DIAGNOSIS — Z34.80 SUPERVISION OF OTHER NORMAL PREGNANCY, ANTEPARTUM: Primary | ICD-10-CM

## 2024-08-14 DIAGNOSIS — O98.419 CHRONIC HEPATITIS C COMPLICATING PREGNANCY, ANTEPARTUM: ICD-10-CM

## 2024-08-14 PROBLEM — F33.1 MAJOR DEPRESSIVE DISORDER, RECURRENT EPISODE, MODERATE: Status: RESOLVED | Noted: 2024-04-16 | Resolved: 2024-08-14

## 2024-08-14 PROBLEM — R03.0 ELEVATED BLOOD PRESSURE READING WITHOUT DIAGNOSIS OF HYPERTENSION: Status: RESOLVED | Noted: 2023-08-31 | Resolved: 2024-08-14

## 2024-08-14 PROBLEM — R20.0 BILATERAL HAND NUMBNESS: Status: RESOLVED | Noted: 2021-08-24 | Resolved: 2024-08-14

## 2024-08-14 PROBLEM — F33.1 MAJOR DEPRESSIVE DISORDER, RECURRENT EPISODE, MODERATE DEGREE: Status: RESOLVED | Noted: 2021-07-12 | Resolved: 2024-08-14

## 2024-08-14 PROBLEM — G56.01 CARPAL TUNNEL SYNDROME OF RIGHT WRIST: Status: RESOLVED | Noted: 2021-09-28 | Resolved: 2024-08-14

## 2024-08-14 LAB
GLUCOSE UR STRIP-MCNC: NEGATIVE MG/DL
PROT UR STRIP-MCNC: ABNORMAL MG/DL

## 2024-08-14 NOTE — PROGRESS NOTES
OB FOLLOW UP  Complaint   Chief Complaint   Patient presents with    Routine Prenatal Visit            Abbe Amos is a 29 y.o.  30w3d patient being seen today for her obstetrical follow up visit. Patient denies decreased fetal movement, contractions, loss of fluid or vaginal bleeding.  No acute complaints.  Doing well with mood medications. Patient denies any headache, visual disturbances, new onset nausea vomiting, right upper quadrant pain, or new onset swelling.      Her prenatal care is complicated by (and status) :    Patient Active Problem List   Diagnosis    History of methamphetamine abuse    History of self-harm    Generalized anxiety disorder    Hidradenitis suppurativa    Cigarette nicotine dependence without complication    Supervision of other normal pregnancy, antepartum    Obesity affecting pregnancy, antepartum    Chronic hepatitis C complicating pregnancy, antepartum    Hx of  delivery, currently pregnant    Depression    Hyperlipemia    Primary hypertension    Post traumatic stress disorder (PTSD)       All other systems reviewed and are negative.     The additional following portions of the patient's history were reviewed and updated as appropriate: allergies, current medications, past family history, past medical history, past social history, past surgical history, and problem list.      EXAM:     Vital signs: /68   Wt 106 kg (234 lb)   LMP 2024   BMI 37.77 kg/m²   Appearance/psychiatric: To be in no distress  Constitutional: The patient is well nourished.  Cardiovascular: She does not have edema.  Respiratory: Respiratory effort is normal.  Gastrointestinal: Abdomen is soft, gravid, nontender, no rashes, heart tones are present, fundal height is size equals dates    Pelvic Exam: No    Urine glucose/protein: See prenatal flowsheet       Assessment and Plan    Problem List Items Addressed This Visit          Cardiac and Vasculature    Primary hypertension    Overview            Continue medication as prescribed from PCP/specialist.  Take medications at the same time every day.  Lifestyle modification advised: DASH diet, reduce stress/anxiety, discussed health weight management, activity as tolerated or advised from PCP, try to avoid alcohol and nicotine.       2024 Never on BP meds; CMP; UPCr  2024 Cr/LFT normal; UPCR 0.06         Relevant Medications    prazosin (MINIPRESS) 2 MG capsule    aspirin 81 MG EC tablet       Gravid and     Chronic hepatitis C complicating pregnancy, antepartum    Overview     Recently completed treatment, has follow up scheduled.  Doing well.  2024 - viral load not detected.          Hx of  delivery, currently pregnant    Overview     With G2, SROM at 36 weeks         Obesity affecting pregnancy, antepartum    Overview     Early 1hGTT 122         Supervision of other normal pregnancy, antepartum - Primary    Overview     ARI finalized: 10/20/24 per LMP, 12-week US and ACOG    Optional testing NIPS,CF/SMA,AFP:  NIPS negative, considering CF/SMA    COVID: Recommended 3/21/24  Flu: Recommended 3/21/24  Tdap: 2024 Rx   RSV:    28-32 weeks repeat TPA:  ? Desires Sterilization: partner with vasectomy.     Anatomy US:6/3/24:ARI confirmed.  +cardiac activity @ 136. Incomplete heart views otherwise all other anatomy appears nml. Anterior grade 1 placenta. Cervix 3.9 no funneling seen  FU US: 24 all heart view WNL, CL 4.4 cm, no funneling    PROBLEM LIST/PLAN:   Maternal obesity -  early 1hGTT 122    Anxiety/depression - stable on current medications, R/B reviewed    Chronic Hep C - recently finished treatment, doing well         Relevant Orders    POC Urinalysis Dipstick (Completed)       Mental Health    Depression    Relevant Medications    busPIRone (BUSPAR) 10 MG tablet    FLUoxetine (PROzac) 20 MG capsule    Generalized anxiety disorder    Relevant Medications    busPIRone (BUSPAR) 10 MG tablet     FLUoxetine (PROzac) 20 MG capsule    History of methamphetamine abuse    Overview     7/31/2024 UDS negative at NOB in March Impression  Pregnancy at 30w3d  Fetal status reassuring.   Activity and Exercise discussed.    Plan  Review of baseline labs for history of chronic hypertension with patient.  Labs within normal limits.  Continue baby aspirin.  Preeclampsia precautions  History of hepatitis C -status posttreatment, viral load not detected  Recommendation for Tdap administration  Return to office in 2 weeks      Patient was counseled to the following pregnancy precautions:  Decreased fetal movement, if concern for decreased fetal movement please perform fetal kick counts you are looking for 10 movements in 2 hours.  If concern for fetal movement and not meeting that criteria, please present to triage for evaluation.  Contractions occurring every 5 minutes for over an hour, lasting 30 to 60 seconds and progressively causing more discomfort, please seek medical attention to rule out labor  If you believe that your water is broken, place a sanitary pad.  If pad fills in short period of time i.e. less than 5 minutes, take off pad placed another pad.  If this is saturated please present for rule out rupture of membranes  Vaginal bleeding can be normal in pregnancy, this usually takes a form of spotting.  If having heavier bleeding like a menstrual period please present for evaluation; especially in light of severe abdominal pain this could represent a placental abruption.  Keep all scheduled appointments as recommended.        Babar Leyva MD  08/14/2024

## 2024-08-26 ENCOUNTER — TELEPHONE (OUTPATIENT)
Dept: OBSTETRICS AND GYNECOLOGY | Facility: CLINIC | Age: 29
End: 2024-08-26
Payer: COMMERCIAL

## 2024-08-26 NOTE — TELEPHONE ENCOUNTER
Hub staff attempted to follow warm transfer process and was unsuccessful     Caller: Abbe Amos    Relationship to patient: Self    Best call back number: [phone] (home)       Patient is needing: PT CALLED IN STATING THAT SHE IS EXPERIENCING SPOTTING AFTER INTERCOURSE. HAS BEEN GOING ON SINCE YESTERDAY 9PM. PT STATES THAT IT IS FRESH BLOOD AND IS HAVING SLIGHT CRAMPING.

## 2024-08-26 NOTE — PROGRESS NOTES
Routine Prenatal Visit     Subjective  Abbe Amos is a 29 y.o.  at 32w3d here for her routine OB visit.   She is taking her prenatal vitamins.Reports no loss of fluid or vaginal bleeding. Patient doing well without any complaints. Pregnancy complicated by:     Patient Active Problem List   Diagnosis    History of methamphetamine abuse    History of self-harm    Generalized anxiety disorder    Hidradenitis suppurativa    Cigarette nicotine dependence without complication    Supervision of other normal pregnancy, antepartum    Obesity affecting pregnancy, antepartum    Chronic hepatitis C complicating pregnancy, antepartum    Hx of  delivery, currently pregnant    Depression    Hyperlipemia    Primary hypertension    Post traumatic stress disorder (PTSD)         OB History    Para Term  AB Living   6 5 4 1 0 5   SAB IAB Ectopic Molar Multiple Live Births   0 0 0 0 0 5      # Outcome Date GA Lbr Rikki/2nd Weight Sex Type Anes PTL Lv   6 Current            5 Term 21 40w0d  3600 g (7 lb 15 oz) F Vag-Spont   JANETH   4 Term 19 40w0d  3459 g (7 lb 10 oz) F Vag-Spont   JANETH   3 Term 09/10/18 40w0d  3345 g (7 lb 6 oz) F Vag-Spont   JANETH   2  17 36w0d  2438 g (5 lb 6 oz) F Vag-Spont   JANETH   1 Term 14 40w0d  3430 g (7 lb 9 oz) M Vag-Spont   JANETH           ROS:   General ROS: negative for - chills or fatigue  Genito-Urinary ROS: negative for  change in urinary stream, vaginal discharge     Objective  Physical Exam:   Vitals:    24 1126   BP: 108/62       Uterine Size: size equals dates  FHT: 110-160 BPM    General appearance - alert, well appearing, and in no distress  Abdomen- Soft, Gravid uterus, non-tender to palpation  Extremeties: negative swelling       Assessment/Plan:   Diagnoses and all orders for this visit:    1. Supervision of other normal pregnancy, antepartum (Primary)  Assessment & Plan:  ARI finalized: 10/20/24 per LMP, 12-week US and ACOG    Optional  testing NIPS,CF/SMA,AFP:  NIPS negative, considering CF/SMA    COVID: Recommended 3/21/24  Flu: Recommended 3/21/24  Tdap: 2024 Rx; 2024 not completed   RSV:    28-32 weeks repeat TPA:  ? Desires Sterilization: partner with vasectomy.     Anatomy US:6/3/24:ARI confirmed.  +cardiac activity @ 136. Incomplete heart views otherwise all other anatomy appears nml. Anterior grade 1 placenta. Cervix 3.9 no funneling seen  FU US: 24 all heart view WNL, CL 4.4 cm, no funneling    PROBLEM LIST/PLAN:   Maternal obesity -  early 1hGTT 122    Anxiety/depression - stable on current medications, R/B reviewed    Chronic Hep C - recently finished treatment, doing well     Orders:  -     POC Urinalysis Dipstick    2. Obesity affecting pregnancy, antepartum, unspecified obesity type    3. Chronic hepatitis C complicating pregnancy, antepartum    4. Hx of  delivery, currently pregnant    5. Generalized anxiety disorder            Counseling:   OB precautions, leaking, VB, leonor hobson vs PTL/Labor  FKC  Round Ligament Pain:  The uterus has several ligaments which provide support and keep the uterus in place. As the  uterus grows these ligaments are pulled and stretched which often causes sharp stabbing like pain in the inguinal area.   You may find a pregnancy support band helpful. Changing positions may also help. Yoga is a great way to cope with round ligament and low back pain in pregnancy.    Massage may also help with low back pain   Things to Consider at this Point in your Pregnancy:  Some women experience swelling in their feet during pregnancy. Compression stockings may help  Drink plenty of water and stay active   Make sure you are eating frequent small meals, nuts are a wonderful snack to keep with you            Return in about 2 weeks (around 2024) for Routine OB visit.      We have gone over prenatal care to include the timing and content of visits. I informed her how to contact the office and/or  on call person in the event of any problems and encouraged her to do so when she feels it is necessary.  We then spent time answering her questions which she indicated were answered to her satisfaction.    Jovita Tavera DO  8/28/2024 11:52 EDT

## 2024-08-28 ENCOUNTER — PATIENT OUTREACH (OUTPATIENT)
Dept: LABOR AND DELIVERY | Facility: HOSPITAL | Age: 29
End: 2024-08-28
Payer: COMMERCIAL

## 2024-08-28 ENCOUNTER — ROUTINE PRENATAL (OUTPATIENT)
Dept: OBSTETRICS AND GYNECOLOGY | Facility: CLINIC | Age: 29
End: 2024-08-28
Payer: COMMERCIAL

## 2024-08-28 VITALS — DIASTOLIC BLOOD PRESSURE: 62 MMHG | WEIGHT: 238 LBS | SYSTOLIC BLOOD PRESSURE: 108 MMHG | BODY MASS INDEX: 38.41 KG/M2

## 2024-08-28 DIAGNOSIS — O09.899 HX OF PRETERM DELIVERY, CURRENTLY PREGNANT: ICD-10-CM

## 2024-08-28 DIAGNOSIS — Z34.80 SUPERVISION OF OTHER NORMAL PREGNANCY, ANTEPARTUM: Primary | ICD-10-CM

## 2024-08-28 DIAGNOSIS — B18.2 CHRONIC HEPATITIS C COMPLICATING PREGNANCY, ANTEPARTUM: ICD-10-CM

## 2024-08-28 DIAGNOSIS — F41.1 GENERALIZED ANXIETY DISORDER: ICD-10-CM

## 2024-08-28 DIAGNOSIS — O99.210 OBESITY AFFECTING PREGNANCY, ANTEPARTUM, UNSPECIFIED OBESITY TYPE: ICD-10-CM

## 2024-08-28 DIAGNOSIS — O98.419 CHRONIC HEPATITIS C COMPLICATING PREGNANCY, ANTEPARTUM: ICD-10-CM

## 2024-08-28 LAB
GLUCOSE UR STRIP-MCNC: NEGATIVE MG/DL
PROT UR STRIP-MCNC: NEGATIVE MG/DL

## 2024-08-28 NOTE — OUTREACH NOTE
[facility]  Enrollment    Current Estimated Gestational Age: 32w3d    Questions/Answers      Flowsheet Row Responses   Would like to participate? Yes          Intake Assessment      Flowsheet Row Responses   Best Method for Contacting Cell   Currently Employed No   Able to keep appointments as scheduled Yes   Gender(s) and Name(s) Boy   Resources Presently Utilizing: WIC (Women, Infant, Children)   Maternal Warning Signs Provided   Other: Provided   Other Education HANDS, How to find a pediatrician, WIC Benefits, Insurance benefits/Incentives            Learning Assessment      Flowsheet Row Responses   Relationship Patient   Does the learner have any barriers to learning? No Barriers   What is the preferred language of the learner for medical teaching? English   Is an  required? No            Pediatrician: KHANH COLVIN  FOB: [place]  Feeding Plan: Breast feeding, has pump.    No current concerns with food, housing or transportation.  Encouraged to reach out for any questions, needs or concerns.    Referral submitted to the following resources (verbal consent received to submit demographic information):         Tobacco, Alcohol, and Drug History     reports that she quit smoking about 13 months ago. Her smoking use included cigarettes. She started smoking about 16 years ago. She has a 7.5 pack-year smoking history. She has never used smokeless tobacco.   reports that she does not currently use alcohol.   reports that she does not currently use drugs after having used the following drugs: Marijuana and Methamphetamines.    Renetta Garcia RN  Maternity Nurse Navigator    8/28/2024, 11:43 EDT

## 2024-08-28 NOTE — ASSESSMENT & PLAN NOTE
ARI finalized: 10/20/24 per LMP, 12-week US and ACOG    Optional testing NIPS,CF/SMA,AFP:  NIPS negative, considering CF/SMA    COVID: Recommended 3/21/24  Flu: Recommended 3/21/24  Tdap: 7/31/2024 Rx; 8/14/2024 not completed   RSV:    28-32 weeks repeat TPA:  ? Desires Sterilization: partner with vasectomy.     Anatomy US:6/3/24:ARI confirmed.  +cardiac activity @ 136. Incomplete heart views otherwise all other anatomy appears nml. Anterior grade 1 placenta. Cervix 3.9 no funneling seen  FU US: 7/1/24 all heart view WNL, CL 4.4 cm, no funneling    PROBLEM LIST/PLAN:   Maternal obesity -  early 1hGTT 122    Anxiety/depression - stable on current medications, R/B reviewed    Chronic Hep C - recently finished treatment, doing well

## 2024-09-25 ENCOUNTER — ROUTINE PRENATAL (OUTPATIENT)
Dept: OBSTETRICS AND GYNECOLOGY | Facility: CLINIC | Age: 29
End: 2024-09-25
Payer: COMMERCIAL

## 2024-09-25 VITALS — SYSTOLIC BLOOD PRESSURE: 126 MMHG | DIASTOLIC BLOOD PRESSURE: 84 MMHG | BODY MASS INDEX: 37.93 KG/M2 | WEIGHT: 235 LBS

## 2024-09-25 DIAGNOSIS — F41.1 GENERALIZED ANXIETY DISORDER: ICD-10-CM

## 2024-09-25 DIAGNOSIS — O09.899 HX OF PRETERM DELIVERY, CURRENTLY PREGNANT: ICD-10-CM

## 2024-09-25 DIAGNOSIS — O99.210 OBESITY AFFECTING PREGNANCY, ANTEPARTUM, UNSPECIFIED OBESITY TYPE: ICD-10-CM

## 2024-09-25 DIAGNOSIS — F15.11 HISTORY OF METHAMPHETAMINE ABUSE: ICD-10-CM

## 2024-09-25 DIAGNOSIS — B18.2 CHRONIC HEPATITIS C COMPLICATING PREGNANCY, ANTEPARTUM: ICD-10-CM

## 2024-09-25 DIAGNOSIS — O10.919 HTN IN PREGNANCY, CHRONIC: ICD-10-CM

## 2024-09-25 DIAGNOSIS — Z34.80 SUPERVISION OF OTHER NORMAL PREGNANCY, ANTEPARTUM: Primary | ICD-10-CM

## 2024-09-25 DIAGNOSIS — O98.419 CHRONIC HEPATITIS C COMPLICATING PREGNANCY, ANTEPARTUM: ICD-10-CM

## 2024-09-25 PROBLEM — F32.A DEPRESSION: Status: RESOLVED | Noted: 2024-04-15 | Resolved: 2024-09-25

## 2024-09-25 PROBLEM — F17.210 CIGARETTE NICOTINE DEPENDENCE WITHOUT COMPLICATION: Status: RESOLVED | Noted: 2021-07-12 | Resolved: 2024-09-25

## 2024-09-25 PROBLEM — L73.2 HIDRADENITIS SUPPURATIVA: Status: RESOLVED | Noted: 2021-07-12 | Resolved: 2024-09-25

## 2024-09-25 PROBLEM — IMO0002 HISTORY OF SELF-HARM: Status: RESOLVED | Noted: 2021-07-12 | Resolved: 2024-09-25

## 2024-09-25 LAB
DEPRECATED RDW RBC AUTO: 45.7 FL (ref 37–54)
ERYTHROCYTE [DISTWIDTH] IN BLOOD BY AUTOMATED COUNT: 12.9 % (ref 12.3–15.4)
GLUCOSE UR STRIP-MCNC: NEGATIVE MG/DL
HCT VFR BLD AUTO: 37.3 % (ref 34–46.6)
HGB BLD-MCNC: 12.3 G/DL (ref 12–15.9)
MCH RBC QN AUTO: 32.2 PG (ref 26.6–33)
MCHC RBC AUTO-ENTMCNC: 33 G/DL (ref 31.5–35.7)
MCV RBC AUTO: 97.6 FL (ref 79–97)
PLATELET # BLD AUTO: 244 10*3/MM3 (ref 140–450)
PMV BLD AUTO: 11.5 FL (ref 6–12)
PROT UR STRIP-MCNC: NEGATIVE MG/DL
RBC # BLD AUTO: 3.82 10*6/MM3 (ref 3.77–5.28)
WBC NRBC COR # BLD AUTO: 9.2 10*3/MM3 (ref 3.4–10.8)

## 2024-09-25 PROCEDURE — 87653 STREP B DNA AMP PROBE: CPT | Performed by: OBSTETRICS & GYNECOLOGY

## 2024-09-25 PROCEDURE — 99214 OFFICE O/P EST MOD 30 MIN: CPT | Performed by: OBSTETRICS & GYNECOLOGY

## 2024-09-25 PROCEDURE — 85027 COMPLETE CBC AUTOMATED: CPT | Performed by: OBSTETRICS & GYNECOLOGY

## 2024-09-26 LAB — GROUP B STREP, DNA: POSITIVE

## 2024-09-27 ENCOUNTER — HOSPITAL ENCOUNTER (OUTPATIENT)
Facility: HOSPITAL | Age: 29
Discharge: HOME | End: 2024-09-27
Attending: OBSTETRICS & GYNECOLOGY | Admitting: OBSTETRICS & GYNECOLOGY
Payer: COMMERCIAL

## 2024-09-27 ENCOUNTER — HOSPITAL ENCOUNTER (OUTPATIENT)
Dept: LABOR AND DELIVERY | Facility: HOSPITAL | Age: 29
Discharge: HOME | End: 2024-09-27
Payer: COMMERCIAL

## 2024-09-27 VITALS
OXYGEN SATURATION: 98 % | SYSTOLIC BLOOD PRESSURE: 116 MMHG | HEART RATE: 89 BPM | RESPIRATION RATE: 18 BRPM | DIASTOLIC BLOOD PRESSURE: 64 MMHG

## 2024-09-27 DIAGNOSIS — I10 PRIMARY HYPERTENSION: ICD-10-CM

## 2024-09-27 PROCEDURE — 59025 FETAL NON-STRESS TEST: CPT

## 2024-09-27 PROCEDURE — G0463 HOSPITAL OUTPT CLINIC VISIT: HCPCS

## 2024-09-27 NOTE — NON STRESS TEST
Obstetrical Non-stress Test Interpretation     Name:  Abbe Amos  MRN: [id number]    29 y.o. female  at 36w5d    Indication: Chronic hypertension      Baseline: Normal 110-160 bpm  Variability:   Moderate/Normal (amplitude 6-25 bpm)  Accelerations: Present (32 weeks+) 15 x 15 bpm  Decelerations: Absent   Contractions:  Absent       Impression:  Category I       Elana Hood MD  2024  19:07 EDT

## 2024-09-27 NOTE — NON STRESS TEST
Obstetrical Non-stress Test Interpretation     Name:  Abbe Amos  MRN: [id number]    29 y.o. female  at 36w5d    Indication: NST/CHTN      Fetal Assessment  Fetal Movement: active  Fetal HR Assessment Method: external  Fetal HR (beats/min): 135  Fetal HR Baseline: normal range  Fetal HR Variability: moderate (amplitude range 6 to 25 bpm)  Fetal HR Accelerations: lasting at least 15 seconds  Fetal HR Decelerations: absent    /64 (BP Location: Right arm)   Pulse 89   Resp 18   LMP 2024   SpO2 98%     Reason for test: OB Triage (NST/CHTN)  Date of Test: 2024  Time frame of test:2161-8134  RN NST Interpretation: Reactive      Hiral Osorio RN  2024  14:48 EDT

## 2024-09-30 NOTE — PROGRESS NOTES
OB FOLLOW UP      Chief Complaint   Patient presents with    Routine Prenatal Visit     Subjective:   No complaints, has had nothing to drink today.  +2 pro, nl BP.  No UTI sxs. Dip UA +2 leukocyte esterase    Objective:  /78   Wt 107 kg (235 lb)   LMP 01/14/2024   BMI 37.93 kg/m²  -1.361 kg (-3 lb) Body mass index is 37.93 kg/m².    See OB flow sheet for fundal height (not performed if US day of OV), FHT, edema, cvx exam if performed, and Upro/Uglu.   Chaperone present during pelvic exam if performed.      Assessment and Plan:  37w3d     Diagnoses and all orders for this visit:    1. High-risk pregnancy in third trimester (Primary)  Overview:  ARI finalized: 10/20/24 per LMP, 12-week US and ACOG    Optional testing NIPS,CF/SMA,AFP:  NIPS negative, considering CF/SMA    COVID: Recommended   Flu: Recommended   Tdap: Recommended, s/p Rx  RSV: too late 10/1/2024     ? Desires Sterilization: partner with vasectomy.   1hr Glucola: 107  FU RPR:    Anatomy US:6/3/24:ARI confirmed.  +cardiac activity @ 136. Incomplete heart views otherwise all other anatomy appears nml. Anterior grade 1 placenta. Cervix 3.9 no funneling seen  FU US: 7/1/24 all heart view WNL, CL 4.4 cm, no funneling  FU US: [facility] 10/2/2024 7 pounds 8 ounces, 75.7%.  AC 84.3%, ROSALIE 22.3, fetal heart rate 125, vertex, anterior grade 2 placenta    PROBLEM LIST/PLAN:   Chronic HTN-see separate   16Oct 39+3 IOL Dr Bernal  Hx HSV- valtrex 1000mg QD rx 10/2/2024   Maternal obesity -pregravid BMI 38.  Early 1hGTT 122  Anxiety/depression/PTSD/history of suicidal ideation- stable on current medications, R/B previously reviewed  Psychiatry [facility] and therapist  BuSpar 20 mg 3 times daily, Prozac 20 mg daily, Minipress 2 mg at at bedtime  Hep C - s/p recent completed treatment.    7/31/2024 viral load negative  History of meth use-UDS negative in March    Orders:  -     POC Urinalysis Dipstick  -     Treponema pallidum AB w/Reflex RPR  -     valACYclovir (VALTREX)  1000 MG tablet; Take 1 tablet by mouth Daily.  Dispense: 90 tablet; Refill: 4  -     Urine Culture - Urine, Urine, Clean Catch    2. Chronic hypertension in pregnancy  Overview:  No meds  Baseline labs normal   ASA 81 daily    testing qweek NST, qThu  Growth US- done        Counseling:    OB precautions, leaking, VB, leonor hobson vs PTL/Labor  C  HTN precautions reviewed: HA, vision change, RUQ/epigastric pain, edema    Reassuring pregnancy progress. Questions answered.  Continue PNV.  Importance of healthy eating, obtaining sufficient sleep, and staying active unless hypertensive- activity modified as directed.    Return in about 1 week (around 10/9/2024) for FU OB q1wk to ARI/Delivery.            Isabel Bernal,   10/02/2024    [facility] [place] [facility]  [address] [place] [address]  Dept: [phone]  Dept Fax: [phone]  Loc: [phone]  Loc Fax: [phone]

## 2024-10-01 PROBLEM — Z86.59 HISTORY OF SUICIDAL IDEATION: Status: ACTIVE | Noted: 2021-07-12

## 2024-10-01 PROBLEM — L73.2 HIDRADENITIS SUPPURATIVA: Status: ACTIVE | Noted: 2021-07-12

## 2024-10-01 PROBLEM — Z86.59 HISTORY OF SUICIDAL IDEATION: Status: RESOLVED | Noted: 2021-07-12 | Resolved: 2024-10-01

## 2024-10-01 PROBLEM — F43.10 POSTTRAUMATIC STRESS DISORDER: Status: RESOLVED | Noted: 2024-09-20 | Resolved: 2024-10-01

## 2024-10-01 PROBLEM — L73.2 HIDRADENITIS SUPPURATIVA: Status: RESOLVED | Noted: 2021-07-12 | Resolved: 2024-10-01

## 2024-10-01 PROBLEM — F15.11 HISTORY OF METHAMPHETAMINE ABUSE: Status: ACTIVE | Noted: 2021-07-12

## 2024-10-01 PROBLEM — O09.93 HIGH-RISK PREGNANCY IN THIRD TRIMESTER: Status: ACTIVE | Noted: 2024-03-21

## 2024-10-01 PROBLEM — F43.10 POSTTRAUMATIC STRESS DISORDER: Status: ACTIVE | Noted: 2024-09-20

## 2024-10-01 RX ORDER — ASPIRIN 81 MG/1
TABLET, COATED ORAL
Qty: 30 TABLET | Refills: 1 | Status: SHIPPED | OUTPATIENT
Start: 2024-10-01

## 2024-10-02 ENCOUNTER — ROUTINE PRENATAL (OUTPATIENT)
Dept: OBSTETRICS AND GYNECOLOGY | Facility: CLINIC | Age: 29
End: 2024-10-02
Payer: COMMERCIAL

## 2024-10-02 VITALS — DIASTOLIC BLOOD PRESSURE: 78 MMHG | WEIGHT: 235 LBS | SYSTOLIC BLOOD PRESSURE: 116 MMHG | BODY MASS INDEX: 37.93 KG/M2

## 2024-10-02 DIAGNOSIS — O10.919 CHRONIC HYPERTENSION IN PREGNANCY: ICD-10-CM

## 2024-10-02 DIAGNOSIS — O09.93 HIGH-RISK PREGNANCY IN THIRD TRIMESTER: Primary | ICD-10-CM

## 2024-10-02 LAB
BILIRUB SERPL-MCNC: ABNORMAL MG/DL
BLOOD, POC: ABNORMAL
GLUCOSE UR STRIP-MCNC: NEGATIVE MG/DL
KETONES UR QL STRIP: ABNORMAL
LEUKOCYTE EST, POC: ABNORMAL
NITRITE UR-MCNC: NEGATIVE MG/ML
PH UR: 5 [PH]
PROT UR STRIP-MCNC: ABNORMAL MG/DL
SPECIFIC GRAVITY: 1.03
UROBILINOGEN UR QL: NORMAL

## 2024-10-02 PROCEDURE — 87086 URINE CULTURE/COLONY COUNT: CPT | Performed by: OBSTETRICS & GYNECOLOGY

## 2024-10-02 RX ORDER — VALACYCLOVIR HYDROCHLORIDE 1 G/1
1000 TABLET, FILM COATED ORAL DAILY
Qty: 90 TABLET | Refills: 4 | Status: SHIPPED | OUTPATIENT
Start: 2024-10-02

## 2024-10-03 ENCOUNTER — HOSPITAL ENCOUNTER (OUTPATIENT)
Dept: LABOR AND DELIVERY | Facility: HOSPITAL | Age: 29
Discharge: HOME | End: 2024-10-03
Payer: COMMERCIAL

## 2024-10-03 ENCOUNTER — HOSPITAL ENCOUNTER (OUTPATIENT)
Facility: HOSPITAL | Age: 29
Discharge: HOME | End: 2024-10-03
Attending: OBSTETRICS & GYNECOLOGY | Admitting: OBSTETRICS & GYNECOLOGY
Payer: COMMERCIAL

## 2024-10-03 ENCOUNTER — LAB (OUTPATIENT)
Dept: OBSTETRICS AND GYNECOLOGY | Facility: CLINIC | Age: 29
End: 2024-10-03
Payer: COMMERCIAL

## 2024-10-03 VITALS — HEART RATE: 79 BPM | SYSTOLIC BLOOD PRESSURE: 110 MMHG | DIASTOLIC BLOOD PRESSURE: 63 MMHG

## 2024-10-03 DIAGNOSIS — O10.919 HTN IN PREGNANCY, CHRONIC: ICD-10-CM

## 2024-10-03 LAB
BACTERIA SPEC AEROBE CULT: NORMAL
TREPONEMA PALLIDUM IGG+IGM AB [PRESENCE] IN SERUM OR PLASMA BY IMMUNOASSAY: NORMAL

## 2024-10-03 PROCEDURE — 86780 TREPONEMA PALLIDUM: CPT | Performed by: OBSTETRICS & GYNECOLOGY

## 2024-10-03 PROCEDURE — 59025 FETAL NON-STRESS TEST: CPT

## 2024-10-03 PROCEDURE — 59025 FETAL NON-STRESS TEST: CPT | Performed by: OBSTETRICS & GYNECOLOGY

## 2024-10-03 NOTE — NON STRESS TEST
Obstetrical Non-stress Test Interpretation     Name:  Abbe Amos  MRN: [id number]    29 y.o. female  at 37w4d    Indication: CHTN      Fetal Assessment  Fetal Movement: active  Fetal HR Assessment Method: external  Fetal HR (beats/min): 125  Fetal HR Baseline: normal range  Fetal HR Variability: moderate (amplitude range 6 to 25 bpm)  Fetal HR Accelerations: greater than/equal to 15 bpm, lasting at least 15 seconds  Fetal HR Decelerations: absent  Sinusoidal Pattern Present: absent    /63   Pulse 79   LMP 2024     Reason for test: Hypertension  Date of Test: 10/3/2024  Time frame of test: 7105-5807  RN NST Interpretation: Reactive      Laurie Mcdonnell RN  10/3/2024  11:16 EDT

## 2024-10-08 ENCOUNTER — ROUTINE PRENATAL (OUTPATIENT)
Dept: OBSTETRICS AND GYNECOLOGY | Facility: CLINIC | Age: 29
End: 2024-10-08
Payer: COMMERCIAL

## 2024-10-08 VITALS — WEIGHT: 235 LBS | DIASTOLIC BLOOD PRESSURE: 74 MMHG | BODY MASS INDEX: 37.93 KG/M2 | SYSTOLIC BLOOD PRESSURE: 114 MMHG

## 2024-10-08 DIAGNOSIS — O09.899 HX OF PRETERM DELIVERY, CURRENTLY PREGNANT: ICD-10-CM

## 2024-10-08 DIAGNOSIS — Z34.80 SUPERVISION OF OTHER NORMAL PREGNANCY, ANTEPARTUM: Primary | ICD-10-CM

## 2024-10-08 DIAGNOSIS — O10.919 HTN IN PREGNANCY, CHRONIC: ICD-10-CM

## 2024-10-08 DIAGNOSIS — O98.419 CHRONIC HEPATITIS C COMPLICATING PREGNANCY, ANTEPARTUM: ICD-10-CM

## 2024-10-08 DIAGNOSIS — O99.210 OBESITY AFFECTING PREGNANCY, ANTEPARTUM, UNSPECIFIED OBESITY TYPE: ICD-10-CM

## 2024-10-08 DIAGNOSIS — F15.11 HISTORY OF METHAMPHETAMINE ABUSE: ICD-10-CM

## 2024-10-08 DIAGNOSIS — B18.2 CHRONIC HEPATITIS C COMPLICATING PREGNANCY, ANTEPARTUM: ICD-10-CM

## 2024-10-08 LAB
GLUCOSE UR STRIP-MCNC: NEGATIVE MG/DL
PROT UR STRIP-MCNC: ABNORMAL MG/DL

## 2024-10-08 NOTE — PROGRESS NOTES
Routine Prenatal Visit     Subjective  Abbe Amos is a 29 y.o.  at 38w2d here for her routine OB visit.   She is taking her prenatal vitamins.Reports no loss of fluid or vaginal bleeding. Patient doing well without any complaints. Pregnancy complicated by:     Patient Active Problem List   Diagnosis    History of methamphetamine abuse    Generalized anxiety disorder    High-risk pregnancy in third trimester    Obesity affecting pregnancy, antepartum    Chronic hepatitis C complicating pregnancy, antepartum    Hx of  delivery, currently pregnant    Chronic hypertension in pregnancy    Post traumatic stress disorder (PTSD)    Herpes genital         OB History    Para Term  AB Living   6 5 4 1 0 5   SAB IAB Ectopic Molar Multiple Live Births   0 0 0 0 0 5      # Outcome Date GA Lbr Rikki/2nd Weight Sex Type Anes PTL Lv   6 Current            5 Term 21 40w0d  3600 g (7 lb 15 oz) F Vag-Spont   JANETH   4 Term 19 40w0d  3459 g (7 lb 10 oz) F Vag-Spont   JANETH   3 Term 09/10/18 40w0d  3345 g (7 lb 6 oz) F Vag-Spont   JANETH   2  17 36w0d  2438 g (5 lb 6 oz) F Vag-Spont   JANETH   1 Term 14 40w0d  3430 g (7 lb 9 oz) M Vag-Spont   JANETH           ROS:   General ROS: negative for - chills or fatigue  Respiratory ROS: negative for - cough or hemoptysis  Cardiovascular ROS: negative for - chest pain or dyspnea on exertion  Genito-Urinary ROS: negative for  change in urinary stream, vaginal discharge   Musculoskeletal ROS: negative for - gait disturbance or joint pain  Dermatological ROS: negative for acne,  dry skin or itching    Objective  Physical Exam:   Vitals:    10/08/24 0910   BP: 114/74       Uterine Size: size equals dates  FHT: 110-160 BPM    General appearance - alert, well appearing, and in no distress  Mental status - alert, oriented to person, place, and time  Abdomen- Soft, Gravid uterus, non-tender to palpation  Musculoskeletal: negative for - gait disturbance or  joint pain  Extremeties: negative swelling or cyanosis   Dermatological: negative rashes or skin lesions   3/60%/-3/soft/mid    Assessment/Plan:   Diagnoses and all orders for this visit:    1. Supervision of other normal pregnancy, antepartum (Primary)  -     POC Urinalysis Dipstick    2. HTN in pregnancy, chronic  Comments:  No meds, stable  Scheduled for IOL next week  Denies any si/sx of preeclampsia    3. Obesity affecting pregnancy, antepartum, unspecified obesity type    4. Chronic hepatitis C complicating pregnancy, antepartum    5. Hx of  delivery, currently pregnant    6. History of methamphetamine abuse            Counseling:   OB precautions, leaking, VB, leonor hobson vs PTL/Labor  FKC  HTN precautions reviewed: HA, vision change, RUQ/epigastric pain, edema  IOL scheduled  Round Ligament Pain:  The uterus has several ligaments which provide support and keep the uterus in place. As the  uterus grows these ligaments are pulled and stretched which often causes sharp stabbing like pain in the inguinal area.   You may find a pregnancy support band helpful. Changing positions may also help. Yoga is a great way to cope with round ligament and low back pain in pregnancy.    Massage may also help with low back pain   Things to Consider at this Point in your Pregnancy:  Some women experience swelling in their feet during pregnancy. Compression stockings may help  Drink plenty of water and stay active   Make sure you are eating frequent small meals, nuts are a wonderful snack to keep with you            Return in about 4 weeks (around 2024) for Postpartum visit.      We have gone over prenatal care to include the timing and content of visits. I informed her how to contact the office and/or on call person in the event of any problems and encouraged her to do so when she feels it is necessary.  We then spent time answering her questions which she indicated were answered to her satisfaction.    Graciela  DO Sofiya  10/8/2024 09:24 EDT

## 2024-10-10 ENCOUNTER — HOSPITAL ENCOUNTER (OUTPATIENT)
Dept: LABOR AND DELIVERY | Facility: HOSPITAL | Age: 29
Discharge: HOME | End: 2024-10-10
Payer: COMMERCIAL

## 2024-10-10 ENCOUNTER — HOSPITAL ENCOUNTER (OUTPATIENT)
Facility: HOSPITAL | Age: 29
Discharge: HOME | End: 2024-10-10
Attending: OBSTETRICS & GYNECOLOGY | Admitting: OBSTETRICS & GYNECOLOGY
Payer: COMMERCIAL

## 2024-10-10 VITALS
DIASTOLIC BLOOD PRESSURE: 79 MMHG | OXYGEN SATURATION: 98 % | SYSTOLIC BLOOD PRESSURE: 125 MMHG | RESPIRATION RATE: 18 BRPM | HEART RATE: 85 BPM

## 2024-10-10 DIAGNOSIS — O10.919 HTN IN PREGNANCY, CHRONIC: ICD-10-CM

## 2024-10-10 PROCEDURE — G0463 HOSPITAL OUTPT CLINIC VISIT: HCPCS

## 2024-10-10 PROCEDURE — 59025 FETAL NON-STRESS TEST: CPT

## 2024-10-10 NOTE — NON STRESS TEST
Obstetrical Non-stress Test Interpretation     Name:  Abbe Amos  MRN: [id number]    29 y.o. female  at 38w4d    Indication: NST/CHTN      Fetal Assessment  Fetal Movement: active  Fetal HR Assessment Method: external  Fetal HR (beats/min): 130  Fetal HR Baseline: normal range  Fetal HR Variability: moderate (amplitude range 6 to 25 bpm)  Fetal HR Accelerations: lasting at least 15 seconds  Fetal HR Decelerations: absent    /79 (BP Location: Right arm, Patient Position: Lying)   Pulse 85   Resp 18   LMP 2024   SpO2 98%     Reason for test: OB Triage (NST/CHTN)  Date of Test: 10/10/2024  Time frame of test: 5093-7374  RN NST Interpretation: Reactive      Hiral Osorio RN  10/10/2024  15:35 EDT

## 2024-10-10 NOTE — NON STRESS TEST
Obstetrical Non-stress Test Interpretation     Name:  Abbe Amos  MRN: [id number]    29 y.o. female  at 38w4d    Indication: Chronic hypertension      Baseline: Normal 110-160 bpm  Variability:   Moderate/Normal (amplitude 6-25 bpm)  Accelerations: Present (32 weeks+) 15 x 15 bpm  Decelerations: Absent   Contractions:  Present       Impression:  Category I       Elana Hood MD  10/10/2024  15:47 EDT

## 2024-10-11 NOTE — PROGRESS NOTES
OB FOLLOW UP      Chief Complaint   Patient presents with    Routine Prenatal Visit     Subjective:   No complaints    Objective:  /79   Wt 106 kg (234 lb)   LMP 2024   BMI 37.77 kg/m²  -1.814 kg (-4 lb) Body mass index is 37.77 kg/m².    See OB flow sheet for fundal height (not performed if US day of OV), FHT, edema, cvx exam if performed, and Upro/Uglu.   Chaperone present during pelvic exam if performed.      Assessment and Plan:  39w2d     Diagnoses and all orders for this visit:    1. High-risk pregnancy in third trimester (Primary)  Overview:  RAI finalized: 10/20/24 per LMP, 12-week US and ACOG    Optional testing NIPS,CF/SMA,AFP:  NIPS negative, considering CF/SMA    COVID: Recommended, declines  Flu: Recommended, declines  Tdap: Vaccinated  RSV: too late 10/1/2024     ? Desires Sterilization: partner with vasectomy.   1hr Glucola: 107  FU RPR: Neg    Anatomy US:6/3/24:ARI confirmed.  +cardiac activity @ 136. Incomplete heart views otherwise all other anatomy appears nml. Anterior grade 1 placenta. Cervix 3.9 no funneling seen  FU US: 24 all heart view WNL, CL 4.4 cm, no funneling  FU US: [facility] 10/2/2024 7 pounds 8 ounces, 75.7%.  AC 84.3%, ROSALIE 22.3, fetal heart rate 125, vertex, anterior grade 2 placenta    PROBLEM LIST/PLAN:   Chronic HTN-see separate   16Oct 39+3 IOL Dr Bernal  Hx HSV- valtrex 1000mg QD - continue  Maternal obesity -pregravid BMI 38.  Early 1hGTT 122  Anxiety/depression/PTSD/history of suicidal ideation- stable on current medications, R/B previously reviewed  Psychiatry [facility] and therapist  BuSpar 20 mg 3 times daily, Prozac 20 mg daily, Minipress 2 mg at at bedtime  Hep C - s/p recent completed treatment.    2024 viral load negative  History of meth use-UDS negative in March    Orders:  -     POC Urinalysis Dipstick    2. Chronic hypertension in pregnancy  Overview:  No meds  Baseline labs normal   ASA 81 daily    testing qweek NST, qThu  Growth US-  done      3. Chronic hepatitis C complicating pregnancy, antepartum  Overview:  Recently completed treatment, has follow up scheduled.  Doing well.  7/31/2024 - viral load not detected.     Orders:  -     Hepatitis C RNA, Quantitative, PCR (graph)      Counseling:    OB precautions, leaking, VB, leonor hobson vs PTL/Labor  FKC  IOL reviewed in detail.  R/B/A/SE/E.  All history reviewed and updated.  Pre-IOL exam performed.  Length can be 24-48+hrs.  PLAN: pit and arom.  All questions answered.  She desires to proceed as planned.  She understands during early am the OB Hospitalist physicians will manage her labor and deliver prn any emergencies.      Reassuring pregnancy progress. Questions answered.  Continue PNV.  Importance of healthy eating, obtaining sufficient sleep, and staying active unless hypertensive- activity modified as directed.    Return in about 4 weeks (around 11/12/2024) for Postpartum FU.            Isabel Bernal, DO  10/15/2024    [facility] [place] [facility]  [address] [place] [address]  Dept: [phone]  Dept Fax: [phone]  Loc: [phone]  Loc Fax: [phone]

## 2024-10-15 ENCOUNTER — ROUTINE PRENATAL (OUTPATIENT)
Dept: OBSTETRICS AND GYNECOLOGY | Facility: CLINIC | Age: 29
End: 2024-10-15
Payer: COMMERCIAL

## 2024-10-15 ENCOUNTER — PREP FOR SURGERY (OUTPATIENT)
Dept: OTHER | Facility: HOSPITAL | Age: 29
End: 2024-10-15
Payer: COMMERCIAL

## 2024-10-15 VITALS — BODY MASS INDEX: 37.77 KG/M2 | DIASTOLIC BLOOD PRESSURE: 79 MMHG | WEIGHT: 234 LBS | SYSTOLIC BLOOD PRESSURE: 117 MMHG

## 2024-10-15 DIAGNOSIS — O09.93 HIGH-RISK PREGNANCY IN THIRD TRIMESTER: Primary | ICD-10-CM

## 2024-10-15 DIAGNOSIS — B18.2 CHRONIC HEPATITIS C COMPLICATING PREGNANCY, ANTEPARTUM: ICD-10-CM

## 2024-10-15 DIAGNOSIS — O98.419 CHRONIC HEPATITIS C COMPLICATING PREGNANCY, ANTEPARTUM: ICD-10-CM

## 2024-10-15 DIAGNOSIS — O10.919 CHRONIC HYPERTENSION IN PREGNANCY: ICD-10-CM

## 2024-10-15 LAB
GLUCOSE UR STRIP-MCNC: NEGATIVE MG/DL
PROT UR STRIP-MCNC: ABNORMAL MG/DL

## 2024-10-15 PROCEDURE — 87522 HEPATITIS C REVRS TRNSCRPJ: CPT | Performed by: OBSTETRICS & GYNECOLOGY

## 2024-10-15 RX ORDER — ACETAMINOPHEN 325 MG/1
650 TABLET ORAL EVERY 4 HOURS PRN
Status: CANCELLED | OUTPATIENT
Start: 2024-10-15

## 2024-10-15 RX ORDER — METOCLOPRAMIDE HYDROCHLORIDE 5 MG/ML
10 INJECTION INTRAMUSCULAR; INTRAVENOUS EVERY 6 HOURS PRN
Status: CANCELLED | OUTPATIENT
Start: 2024-10-15

## 2024-10-15 RX ORDER — HYDROCODONE BITARTRATE AND ACETAMINOPHEN 5; 325 MG/1; MG/1
1 TABLET ORAL EVERY 4 HOURS PRN
Status: CANCELLED | OUTPATIENT
Start: 2024-10-15 | End: 2024-10-22

## 2024-10-15 RX ORDER — ONDANSETRON 2 MG/ML
4 INJECTION INTRAMUSCULAR; INTRAVENOUS EVERY 6 HOURS PRN
Status: CANCELLED | OUTPATIENT
Start: 2024-10-15

## 2024-10-15 RX ORDER — OXYTOCIN/0.9 % SODIUM CHLORIDE 30/500 ML
250 PLASTIC BAG, INJECTION (ML) INTRAVENOUS ONCE AS NEEDED
Status: CANCELLED | OUTPATIENT
Start: 2024-10-17

## 2024-10-15 RX ORDER — SODIUM CHLORIDE 0.9 % (FLUSH) 0.9 %
10 SYRINGE (ML) INJECTION AS NEEDED
Status: CANCELLED | OUTPATIENT
Start: 2024-10-15

## 2024-10-15 RX ORDER — SODIUM CHLORIDE, SODIUM LACTATE, POTASSIUM CHLORIDE, CALCIUM CHLORIDE 600; 310; 30; 20 MG/100ML; MG/100ML; MG/100ML; MG/100ML
30 INJECTION, SOLUTION INTRAVENOUS CONTINUOUS
Status: CANCELLED | OUTPATIENT
Start: 2024-10-15 | End: 2024-10-16

## 2024-10-15 RX ORDER — OXYTOCIN/0.9 % SODIUM CHLORIDE 30/500 ML
999 PLASTIC BAG, INJECTION (ML) INTRAVENOUS ONCE
Status: CANCELLED | OUTPATIENT
Start: 2024-10-15

## 2024-10-15 RX ORDER — OXYTOCIN/0.9 % SODIUM CHLORIDE 30/500 ML
1-16 PLASTIC BAG, INJECTION (ML) INTRAVENOUS
Status: CANCELLED | OUTPATIENT
Start: 2024-10-15

## 2024-10-15 RX ORDER — PROMETHAZINE HYDROCHLORIDE 12.5 MG/1
12.5 TABLET ORAL EVERY 6 HOURS PRN
Status: CANCELLED | OUTPATIENT
Start: 2024-10-15

## 2024-10-15 RX ORDER — FAMOTIDINE 10 MG/ML
20 INJECTION, SOLUTION INTRAVENOUS ONCE
Status: CANCELLED | OUTPATIENT
Start: 2024-10-15 | End: 2024-10-15

## 2024-10-15 RX ORDER — OXYTOCIN/0.9 % SODIUM CHLORIDE 30/500 ML
250 PLASTIC BAG, INJECTION (ML) INTRAVENOUS CONTINUOUS
Status: CANCELLED | OUTPATIENT
Start: 2024-10-15 | End: 2024-10-15

## 2024-10-15 RX ORDER — MISOPROSTOL 200 UG/1
800 TABLET ORAL AS NEEDED
Status: CANCELLED | OUTPATIENT
Start: 2024-10-15

## 2024-10-15 RX ORDER — KETOROLAC TROMETHAMINE 30 MG/ML
30 INJECTION, SOLUTION INTRAMUSCULAR; INTRAVENOUS ONCE AS NEEDED
Status: CANCELLED | OUTPATIENT
Start: 2024-10-15 | End: 2024-10-16

## 2024-10-15 RX ORDER — ONDANSETRON 4 MG/1
4 TABLET, ORALLY DISINTEGRATING ORAL EVERY 6 HOURS PRN
Status: CANCELLED | OUTPATIENT
Start: 2024-10-15

## 2024-10-15 RX ORDER — MAGNESIUM CARB/ALUMINUM HYDROX 105-160MG
30 TABLET,CHEWABLE ORAL ONCE
Status: CANCELLED | OUTPATIENT
Start: 2024-10-15 | End: 2024-10-15

## 2024-10-15 RX ORDER — CARBOPROST TROMETHAMINE 250 UG/ML
250 INJECTION, SOLUTION INTRAMUSCULAR ONCE AS NEEDED
Status: CANCELLED | OUTPATIENT
Start: 2024-10-15

## 2024-10-15 RX ORDER — ACETAMINOPHEN 325 MG/1
650 TABLET ORAL ONCE AS NEEDED
Status: CANCELLED | OUTPATIENT
Start: 2024-10-15

## 2024-10-15 RX ORDER — TERBUTALINE SULFATE 1 MG/ML
0.25 INJECTION, SOLUTION SUBCUTANEOUS AS NEEDED
Status: CANCELLED | OUTPATIENT
Start: 2024-10-15

## 2024-10-15 RX ORDER — FAMOTIDINE 20 MG/1
20 TABLET, FILM COATED ORAL ONCE AS NEEDED
Status: CANCELLED | OUTPATIENT
Start: 2024-10-15

## 2024-10-15 RX ORDER — IBUPROFEN 800 MG/1
800 TABLET, FILM COATED ORAL ONCE AS NEEDED
Status: CANCELLED | OUTPATIENT
Start: 2024-10-15

## 2024-10-15 RX ORDER — FAMOTIDINE 10 MG/ML
20 INJECTION, SOLUTION INTRAVENOUS ONCE AS NEEDED
Status: CANCELLED | OUTPATIENT
Start: 2024-10-15

## 2024-10-15 RX ORDER — LIDOCAINE HYDROCHLORIDE 10 MG/ML
0.5 INJECTION, SOLUTION EPIDURAL; INFILTRATION; INTRACAUDAL; PERINEURAL ONCE AS NEEDED
Status: CANCELLED | OUTPATIENT
Start: 2024-10-15

## 2024-10-15 RX ORDER — FAMOTIDINE 20 MG/1
20 TABLET, FILM COATED ORAL 2 TIMES DAILY PRN
Status: CANCELLED | OUTPATIENT
Start: 2024-10-15

## 2024-10-15 RX ORDER — SODIUM CHLORIDE 0.9 % (FLUSH) 0.9 %
10 SYRINGE (ML) INJECTION EVERY 12 HOURS SCHEDULED
Status: CANCELLED | OUTPATIENT
Start: 2024-10-15

## 2024-10-15 RX ORDER — FAMOTIDINE 10 MG/ML
20 INJECTION, SOLUTION INTRAVENOUS 2 TIMES DAILY PRN
Status: CANCELLED | OUTPATIENT
Start: 2024-10-15

## 2024-10-15 RX ORDER — PROMETHAZINE HYDROCHLORIDE 25 MG/1
25 TABLET ORAL EVERY 6 HOURS PRN
Status: CANCELLED | OUTPATIENT
Start: 2024-10-15

## 2024-10-15 RX ORDER — SODIUM CHLORIDE 9 MG/ML
40 INJECTION, SOLUTION INTRAVENOUS AS NEEDED
Status: CANCELLED | OUTPATIENT
Start: 2024-10-15 | End: 2024-10-16

## 2024-10-15 RX ORDER — HYDROCODONE BITARTRATE AND ACETAMINOPHEN 10; 325 MG/1; MG/1
1 TABLET ORAL EVERY 4 HOURS PRN
Status: CANCELLED | OUTPATIENT
Start: 2024-10-15 | End: 2024-10-22

## 2024-10-15 RX ORDER — OXYTOCIN/0.9 % SODIUM CHLORIDE 30/500 ML
999 PLASTIC BAG, INJECTION (ML) INTRAVENOUS ONCE AS NEEDED
Status: CANCELLED | OUTPATIENT
Start: 2024-10-15 | End: 2024-10-16

## 2024-10-15 NOTE — H&P
OB HISTORY AND PHYSICAL      SUBJECTIVE:    29 y.o. female  currently at 39w2d [facility] complicated by:      Patient Active Problem List    Diagnosis     High-risk pregnancy in third trimester [O09.93]     Chronic hypertension in pregnancy [O10.919]     Herpes genital [B00.9]     Chronic hepatitis C complicating pregnancy, antepartum [O98.419, B18.2]     Post traumatic stress disorder (PTSD) [F43.10]     Obesity affecting pregnancy, antepartum [O99.210]     Hx of  delivery, currently pregnant [O09.899]     History of methamphetamine abuse [F15.11]     Generalized anxiety disorder [F41.1]        CC/HPI:  Presents with Scheduled IOL.     ROS: No leaking fluid, No vaginal bleeding, No contractions, Is feeling adequate FM, No HA, No scotomata or vision changes, No RUQ/epigastric pain, and No swelling    Past OB History:   OB History    Para Term  AB Living   6 5 4 1 0 5   SAB IAB Ectopic Molar Multiple Live Births   0 0 0 0 0 5      # Outcome Date GA Lbr Rikki/2nd Weight Sex Type Anes PTL Lv   6 Current            5 Term 21 40w0d  3600 g (7 lb 15 oz) F Vag-Spont   JANETH   4 Term 19 40w0d  3459 g (7 lb 10 oz) F Vag-Spont   JANETH   3 Term 09/10/18 40w0d  3345 g (7 lb 6 oz) F Vag-Spont   JANETH   2  17 36w0d  2438 g (5 lb 6 oz) F Vag-Spont   JANETH   1 Term 14 40w0d  3430 g (7 lb 9 oz) M Vag-Spont   JANETH        Prenatal Labs:    Prenatal Results       Initial Prenatal Labs       Test Value Reference Range Date Time    Hemoglobin  13.1 g/dL 12.0 - 15.9 04/15/24 1924       12.7 g/dL 12.0 - 15.9 24 1018    Hematocrit  38.4 % 34.0 - 46.6 04/15/24 1924       37.9 % 34.0 - 46.6 24 1018    Platelets  246 10*3/mm3 140 - 450 04/15/24 1924       264 10*3/mm3 140 - 450 24 1018    Rubella IgG  2.75 index Immune >0.99 24 1018    Hepatitis B SAg  Non-Reactive  Non-Reactive 24 1018    Hepatitis C Ab  Reactive  Non-Reactive 24 1018    RPR        T. Pallidum Ab    Non-Reactive  Non-Reactive 10/03/24 1136       Non-Reactive  Non-Reactive 03/21/24 1018    ABO  O   03/21/24 1018    Rh  Positive   03/21/24 1018    Antibody Screen  Negative   03/21/24 1018    HIV  Non-Reactive  Non-Reactive 03/21/24 1018    Urine Culture  25,000 CFU/mL Normal Urogenital Bushra   10/02/24 1235       No growth   03/21/24 1010    Gonorrhea  Negative  Negative 03/21/24 1010    Chlamydia  Negative  Negative 03/21/24 1010    TSH        HgB A1c         Varicella IgG        Hemoglobinopathy Fractionation  Comment   03/21/24 1018    Hemoglobinopathy (genetic testing)        Cystic fibrosis                   Fetal testing        Test Value Reference Range Date Time    NIPT        MSAFP        AFP-4                  2nd and 3rd Trimester       Test Value Reference Range Date Time    Hemoglobin (repeated)  12.3 g/dL 12.0 - 15.9 09/25/24 0915       12.1 g/dL 12.0 - 15.9 07/01/24 1546    Hematocrit (repeated)  37.3 % 34.0 - 46.6 09/25/24 0915       36.4 % 34.0 - 46.6 07/01/24 1546    Platelets   244 10*3/mm3 140 - 450 09/25/24 0915       240 10*3/mm3 140 - 450 07/01/24 1546       246 10*3/mm3 140 - 450 04/15/24 1924       264 10*3/mm3 140 - 450 03/21/24 1018    1 hour GTT   107 mg/dL 65 - 139 07/01/24 1546       122 mg/dL 65 - 139 03/28/24 0905    Antibody Screen (repeated)        3rd TM syphilis scrn (repeated)  RPR         3rd TM syphilis scrn (repeated) TP-Ab  Non-Reactive  Non-Reactive 10/03/24 1136    3rd TM syphilis screen TB-Ab (FTA)  Non-Reactive  Non-Reactive 10/03/24 1136    Syphilis cascade test TP-Ab (EIA)        Syphilis cascade TPPA        GTT Fasting        GTT 1 Hr        GTT 2 Hr        GTT 3 Hr        Group B Strep  Positive  Negative 09/25/24 0841              Other testing        Test Value Reference Range Date Time    Parvo IgG         CMV IgG                   Drug Screening       Test Value Reference Range Date Time    Amphetamine Screen        Barbiturate Screen  Negative  Negative  04/15/24 1930       Negative  Negative 03/21/24 1010    Benzodiazepine Screen  Negative  Negative 04/15/24 1930       Negative  Negative 03/21/24 1010    Methadone Screen  Negative  Negative 04/15/24 1930       Negative  Negative 03/21/24 1010    Phencyclidine Screen        Opiates Screen  Negative  Negative 04/15/24 1930       Negative  Negative 03/21/24 1010    THC Screen  Negative  Negative 04/15/24 1930       Negative  Negative 03/21/24 1010    Cocaine Screen  Negative  Negative 04/15/24 1930       Negative  Negative 03/21/24 1010    Propoxyphene Screen        Buprenorphine Screen        Methamphetamine Screen        Oxycodone Screen  Negative  Negative 04/15/24 1930       Negative  Negative 03/21/24 1010    Tricyclic Antidepressants Screen                     PMHx:    Past Medical History:   Diagnosis Date    Abnormal Pap smear of cervix     Ankle sprain 08/09/2022    Left ankle    Anxiety     Carpal tunnel syndrome of right wrist 09/28/2021    Chemical dependency 06/2019    Chronic viral hepatitis C 05/28/2021    Depression     Fracture of toe of right foot     5th    Fracture of wrist 08/18/2020    Right hand    Herpes genital     Hidradenitis suppurativa 07/12/2021    History of methamphetamine abuse 06/11/2020    History of self-harm 06/11/2020    History of suicidal ideation 07/12/2021    HPV (human papilloma virus) infection     Hyperlipemia     Nicotine dependence     Night sweats     Post traumatic stress disorder (PTSD) 04/16/2024    Primary hypertension 03/17/2023    Sebaceous cyst of left axilla     STD (sexually transmitted disease)        Medications:  FLUoxetine, Prenatal Vit-Fe Fumarate-FA, busPIRone, prazosin, and valACYclovir    Allergies:  No Known Allergies    PSHx:    Past Surgical History:   Procedure Laterality Date    EXCISION LESION Left 11/26/2021    Procedure: EXCISION CYST left axilla;  Surgeon: Serg Sterling MD;  Location: [facility] MAIN OR;  Service: General;  Laterality:  Left;    PILONIDAL CYST DRAINAGE      at age 11       Social History:    reports that she quit smoking about 15 months ago. Her smoking use included cigarettes. She started smoking about 16 years ago. She has a 7.5 pack-year smoking history. She has never used smokeless tobacco. She reports that she does not currently use alcohol. She reports that she does not currently use drugs after having used the following drugs: Marijuana and Methamphetamines.    Family History: Non contributory    Immunizations: See prenatal record for Tdap, Flu, Covid and/or other vaccinations    PHYSICAL EXAM:  BP: (117)/(79) 117/79  General- NAD, alert and oriented, appropriate  Psych- normal mood, good memory  Cardiovascular- Regular rhythm, no murnurs  Respiratory- CTA to bases, no wheezes  Abdomen- Gravid, non tender  Fundus-  Non tender.  Size: consistent with dates  EFW- 8 1/2 lbs, 9 lbs, [facility] ultrasound 10/2/2024 7 pounds 8 ounces, 75.7%.  AC 84%, ROSALIE 22   Pelvis-  Adequate   Cervix- 3cm / 50% / -3  Presentation- VTX  Extremities/DTRs- Trace edema, bilaterally equal, no signs of DVT    Fetal HR: Doptones 110-160, see last OV note  Contractions: Not complaining of any regular contractions      ASSESSMENT:  39w2d  Scheduled IOL  Chronic hypertension-no medications  History of genital HSV-Valtrex 1000 mg daily, no prodrome and no lesions  Anxiety depression PTSD and history of suicidal ideation-stable, [facility] for psychiatry, BuSpar, Prozac, Minipress  Chronic viral hep C, status post recent treatment-viral load negative in July, pending 10/14  History of meth use, UDS negative in the spring    Lab Results   Component Value Date    STREPGPB Positive (A) 09/25/2024       PLAN:  Admit  Delivery: IOL pitocin and AROM, ANTIBIOTICS for GBS, check CMP    Plan of care NLT hospital course, R/B/A/potential SE, suspected length <24hrs have been reviewed with patient and any family or friends present, questions answered to her/his/their  satisfaction.  Pt desires to proceed as above.    Counseling:The patient was counseled on the risks, benefits and alternatives of Induction.  Risks reviewed, but are not limited to: bleeding, transfusion, fetal intolerance,  (possibly emergent),  and uterine rupture.  She declines expectant management or  and desires induction.  All her questions have been answered to her satisfaction and she desires to proceed.          Electronically signed by Isabel Bernal DO, 10/15/24, 7:48 PM EDT.    KHANH HAYES [facility] ORDERS ONLY  [address] [facility] [address] [facility] [facility] [address]  Dept: [phone]  Loc: [phone]

## 2024-10-15 NOTE — H&P (VIEW-ONLY)
OB HISTORY AND PHYSICAL      SUBJECTIVE:    29 y.o. female  currently at 39w2d [facility] complicated by:      Patient Active Problem List    Diagnosis     High-risk pregnancy in third trimester [O09.93]     Chronic hypertension in pregnancy [O10.919]     Herpes genital [B00.9]     Chronic hepatitis C complicating pregnancy, antepartum [O98.419, B18.2]     Post traumatic stress disorder (PTSD) [F43.10]     Obesity affecting pregnancy, antepartum [O99.210]     Hx of  delivery, currently pregnant [O09.899]     History of methamphetamine abuse [F15.11]     Generalized anxiety disorder [F41.1]        CC/HPI:  Presents with Scheduled IOL.     ROS: No leaking fluid, No vaginal bleeding, No contractions, Is feeling adequate FM, No HA, No scotomata or vision changes, No RUQ/epigastric pain, and No swelling    Past OB History:   OB History    Para Term  AB Living   6 5 4 1 0 5   SAB IAB Ectopic Molar Multiple Live Births   0 0 0 0 0 5      # Outcome Date GA Lbr Rikki/2nd Weight Sex Type Anes PTL Lv   6 Current            5 Term 21 40w0d  3600 g (7 lb 15 oz) F Vag-Spont   JANETH   4 Term 19 40w0d  3459 g (7 lb 10 oz) F Vag-Spont   JANETH   3 Term 09/10/18 40w0d  3345 g (7 lb 6 oz) F Vag-Spont   JANETH   2  17 36w0d  2438 g (5 lb 6 oz) F Vag-Spont   JANETH   1 Term 14 40w0d  3430 g (7 lb 9 oz) M Vag-Spont   JANETH        Prenatal Labs:    Prenatal Results       Initial Prenatal Labs       Test Value Reference Range Date Time    Hemoglobin  13.1 g/dL 12.0 - 15.9 04/15/24 1924       12.7 g/dL 12.0 - 15.9 24 1018    Hematocrit  38.4 % 34.0 - 46.6 04/15/24 1924       37.9 % 34.0 - 46.6 24 1018    Platelets  246 10*3/mm3 140 - 450 04/15/24 1924       264 10*3/mm3 140 - 450 24 1018    Rubella IgG  2.75 index Immune >0.99 24 1018    Hepatitis B SAg  Non-Reactive  Non-Reactive 24 1018    Hepatitis C Ab  Reactive  Non-Reactive 24 1018    RPR        T. Pallidum Ab    Non-Reactive  Non-Reactive 10/03/24 1136       Non-Reactive  Non-Reactive 03/21/24 1018    ABO  O   03/21/24 1018    Rh  Positive   03/21/24 1018    Antibody Screen  Negative   03/21/24 1018    HIV  Non-Reactive  Non-Reactive 03/21/24 1018    Urine Culture  25,000 CFU/mL Normal Urogenital Bushra   10/02/24 1235       No growth   03/21/24 1010    Gonorrhea  Negative  Negative 03/21/24 1010    Chlamydia  Negative  Negative 03/21/24 1010    TSH        HgB A1c         Varicella IgG        Hemoglobinopathy Fractionation  Comment   03/21/24 1018    Hemoglobinopathy (genetic testing)        Cystic fibrosis                   Fetal testing        Test Value Reference Range Date Time    NIPT        MSAFP        AFP-4                  2nd and 3rd Trimester       Test Value Reference Range Date Time    Hemoglobin (repeated)  12.3 g/dL 12.0 - 15.9 09/25/24 0915       12.1 g/dL 12.0 - 15.9 07/01/24 1546    Hematocrit (repeated)  37.3 % 34.0 - 46.6 09/25/24 0915       36.4 % 34.0 - 46.6 07/01/24 1546    Platelets   244 10*3/mm3 140 - 450 09/25/24 0915       240 10*3/mm3 140 - 450 07/01/24 1546       246 10*3/mm3 140 - 450 04/15/24 1924       264 10*3/mm3 140 - 450 03/21/24 1018    1 hour GTT   107 mg/dL 65 - 139 07/01/24 1546       122 mg/dL 65 - 139 03/28/24 0905    Antibody Screen (repeated)        3rd TM syphilis scrn (repeated)  RPR         3rd TM syphilis scrn (repeated) TP-Ab  Non-Reactive  Non-Reactive 10/03/24 1136    3rd TM syphilis screen TB-Ab (FTA)  Non-Reactive  Non-Reactive 10/03/24 1136    Syphilis cascade test TP-Ab (EIA)        Syphilis cascade TPPA        GTT Fasting        GTT 1 Hr        GTT 2 Hr        GTT 3 Hr        Group B Strep  Positive  Negative 09/25/24 0841              Other testing        Test Value Reference Range Date Time    Parvo IgG         CMV IgG                   Drug Screening       Test Value Reference Range Date Time    Amphetamine Screen        Barbiturate Screen  Negative  Negative  04/15/24 1930       Negative  Negative 03/21/24 1010    Benzodiazepine Screen  Negative  Negative 04/15/24 1930       Negative  Negative 03/21/24 1010    Methadone Screen  Negative  Negative 04/15/24 1930       Negative  Negative 03/21/24 1010    Phencyclidine Screen        Opiates Screen  Negative  Negative 04/15/24 1930       Negative  Negative 03/21/24 1010    THC Screen  Negative  Negative 04/15/24 1930       Negative  Negative 03/21/24 1010    Cocaine Screen  Negative  Negative 04/15/24 1930       Negative  Negative 03/21/24 1010    Propoxyphene Screen        Buprenorphine Screen        Methamphetamine Screen        Oxycodone Screen  Negative  Negative 04/15/24 1930       Negative  Negative 03/21/24 1010    Tricyclic Antidepressants Screen                     PMHx:    Past Medical History:   Diagnosis Date    Abnormal Pap smear of cervix     Ankle sprain 08/09/2022    Left ankle    Anxiety     Carpal tunnel syndrome of right wrist 09/28/2021    Chemical dependency 06/2019    Chronic viral hepatitis C 05/28/2021    Depression     Fracture of toe of right foot     5th    Fracture of wrist 08/18/2020    Right hand    Herpes genital     Hidradenitis suppurativa 07/12/2021    History of methamphetamine abuse 06/11/2020    History of self-harm 06/11/2020    History of suicidal ideation 07/12/2021    HPV (human papilloma virus) infection     Hyperlipemia     Nicotine dependence     Night sweats     Post traumatic stress disorder (PTSD) 04/16/2024    Primary hypertension 03/17/2023    Sebaceous cyst of left axilla     STD (sexually transmitted disease)        Medications:  FLUoxetine, Prenatal Vit-Fe Fumarate-FA, busPIRone, prazosin, and valACYclovir    Allergies:  No Known Allergies    PSHx:    Past Surgical History:   Procedure Laterality Date    EXCISION LESION Left 11/26/2021    Procedure: EXCISION CYST left axilla;  Surgeon: Serg Sterling MD;  Location: [facility] MAIN OR;  Service: General;  Laterality:  Left;    PILONIDAL CYST DRAINAGE      at age 11       Social History:    reports that she quit smoking about 15 months ago. Her smoking use included cigarettes. She started smoking about 16 years ago. She has a 7.5 pack-year smoking history. She has never used smokeless tobacco. She reports that she does not currently use alcohol. She reports that she does not currently use drugs after having used the following drugs: Marijuana and Methamphetamines.    Family History: Non contributory    Immunizations: See prenatal record for Tdap, Flu, Covid and/or other vaccinations    PHYSICAL EXAM:  BP: (117)/(79) 117/79  General- NAD, alert and oriented, appropriate  Psych- normal mood, good memory  Cardiovascular- Regular rhythm, no murnurs  Respiratory- CTA to bases, no wheezes  Abdomen- Gravid, non tender  Fundus-  Non tender.  Size: consistent with dates  EFW- 8 1/2 lbs, 9 lbs, [facility] ultrasound 10/2/2024 7 pounds 8 ounces, 75.7%.  AC 84%, ROSALIE 22   Pelvis-  Adequate   Cervix- 3cm / 50% / -3  Presentation- VTX  Extremities/DTRs- Trace edema, bilaterally equal, no signs of DVT    Fetal HR: Doptones 110-160, see last OV note  Contractions: Not complaining of any regular contractions      ASSESSMENT:  39w2d  Scheduled IOL  Chronic hypertension-no medications  History of genital HSV-Valtrex 1000 mg daily, no prodrome and no lesions  Anxiety depression PTSD and history of suicidal ideation-stable, [facility] for psychiatry, BuSpar, Prozac, Minipress  Chronic viral hep C, status post recent treatment-viral load negative in July, pending 10/14  History of meth use, UDS negative in the spring    Lab Results   Component Value Date    STREPGPB Positive (A) 09/25/2024       PLAN:  Admit  Delivery: IOL pitocin and AROM, ANTIBIOTICS for GBS, check CMP    Plan of care NLT hospital course, R/B/A/potential SE, suspected length <24hrs have been reviewed with patient and any family or friends present, questions answered to her/his/their  satisfaction.  Pt desires to proceed as above.    Counseling:The patient was counseled on the risks, benefits and alternatives of Induction.  Risks reviewed, but are not limited to: bleeding, transfusion, fetal intolerance,  (possibly emergent),  and uterine rupture.  She declines expectant management or  and desires induction.  All her questions have been answered to her satisfaction and she desires to proceed.          Electronically signed by Isabel Bernal DO, 10/15/24, 7:48 PM EDT.    KHANH HAYES [facility] ORDERS ONLY  [address] [place] [address]  Dept: [phone]  Loc: [phone]

## 2024-10-16 ENCOUNTER — HOSPITAL ENCOUNTER (INPATIENT)
Facility: HOSPITAL | Age: 29
LOS: 2 days | Discharge: HOME | End: 2024-10-18
Attending: OBSTETRICS & GYNECOLOGY | Admitting: OBSTETRICS & GYNECOLOGY
Payer: COMMERCIAL

## 2024-10-16 ENCOUNTER — ANESTHESIA EVENT (OUTPATIENT)
Dept: LABOR AND DELIVERY | Facility: HOSPITAL | Age: 29
End: 2024-10-16
Payer: COMMERCIAL

## 2024-10-16 ENCOUNTER — HOSPITAL ENCOUNTER (OUTPATIENT)
Dept: LABOR AND DELIVERY | Facility: HOSPITAL | Age: 29
Discharge: HOME | End: 2024-10-16
Payer: COMMERCIAL

## 2024-10-16 ENCOUNTER — ANESTHESIA (OUTPATIENT)
Dept: LABOR AND DELIVERY | Facility: HOSPITAL | Age: 29
End: 2024-10-16
Payer: COMMERCIAL

## 2024-10-16 PROBLEM — R89.9 ABNORMAL LABORATORY TEST RESULT: Status: ACTIVE | Noted: 2024-10-16

## 2024-10-16 PROBLEM — Z34.90 ENCOUNTER FOR INDUCTION OF LABOR: Status: ACTIVE | Noted: 2024-10-16

## 2024-10-16 LAB
ABO GROUP BLD: NORMAL
ALBUMIN SERPL-MCNC: 3.8 G/DL (ref 3.5–5.2)
ALBUMIN/GLOB SERPL: 1.1 G/DL
ALP SERPL-CCNC: 135 U/L (ref 39–117)
ALT SERPL W P-5'-P-CCNC: 12 U/L (ref 1–33)
AMPHET+METHAMPHET UR QL: NEGATIVE
AMPHETAMINES UR QL: NEGATIVE
ANION GAP SERPL CALCULATED.3IONS-SCNC: 13.6 MMOL/L (ref 5–15)
AST SERPL-CCNC: 18 U/L (ref 1–32)
ATMOSPHERIC PRESS: 750.2 MMHG
ATMOSPHERIC PRESS: 752 MMHG
BARBITURATES UR QL SCN: NEGATIVE
BASE EXCESS BLDCOA CALC-SCNC: -3.5 MMOL/L (ref -2–2)
BASE EXCESS BLDCOV CALC-SCNC: -3.8 MMOL/L (ref -30–30)
BENZODIAZ UR QL SCN: POSITIVE
BILIRUB SERPL-MCNC: 0.3 MG/DL (ref 0–1.2)
BLD GP AB SCN SERPL QL: NEGATIVE
BUN SERPL-MCNC: 5 MG/DL (ref 6–20)
BUN/CREAT SERPL: 6.7 (ref 7–25)
BUPRENORPHINE SERPL-MCNC: NEGATIVE NG/ML
CALCIUM SPEC-SCNC: 8.6 MG/DL (ref 8.6–10.5)
CANNABINOIDS SERPL QL: NEGATIVE
CHLORIDE SERPL-SCNC: 105 MMOL/L (ref 98–107)
CO2 SERPL-SCNC: 17.4 MMOL/L (ref 22–29)
COCAINE UR QL: NEGATIVE
CREAT SERPL-MCNC: 0.75 MG/DL (ref 0.57–1)
DEPRECATED RDW RBC AUTO: 44.1 FL (ref 37–54)
EGFRCR SERPLBLD CKD-EPI 2021: 110 ML/MIN/1.73
ERYTHROCYTE [DISTWIDTH] IN BLOOD BY AUTOMATED COUNT: 13.3 % (ref 12.3–15.4)
FENTANYL UR-MCNC: NEGATIVE NG/ML
GLOBULIN UR ELPH-MCNC: 3.5 GM/DL
GLUCOSE SERPL-MCNC: 134 MG/DL (ref 65–99)
HCO3 BLDCOA-SCNC: 25.1 MMOL/L
HCO3 BLDCOV-SCNC: 21.8 MMOL/L
HCT VFR BLD AUTO: 35.4 % (ref 34–46.6)
HGB BLD-MCNC: 12.2 G/DL (ref 12–15.9)
MCH RBC QN AUTO: 31.9 PG (ref 26.6–33)
MCHC RBC AUTO-ENTMCNC: 34.5 G/DL (ref 31.5–35.7)
MCV RBC AUTO: 92.7 FL (ref 79–97)
METHADONE UR QL SCN: NEGATIVE
OPIATES UR QL: NEGATIVE
OXYCODONE UR QL SCN: NEGATIVE
PCO2 BLDCOA: 58.8 MMHG (ref 33–49)
PCO2 BLDCOV: 40.6 MM HG (ref 35–51.3)
PCP UR QL SCN: NEGATIVE
PH BLDCOA: 7.24 PH UNITS (ref 7.18–7.34)
PH BLDCOV: 7.34 PH UNITS (ref 7.26–7.4)
PLATELET # BLD AUTO: 230 10*3/MM3 (ref 140–450)
PMV BLD AUTO: 11.3 FL (ref 6–12)
PO2 BLDCOA: 11.4 MMHG
PO2 BLDCOV: 27.4 MM HG (ref 19–39)
POTASSIUM SERPL-SCNC: 3.4 MMOL/L (ref 3.5–5.2)
PROT SERPL-MCNC: 7.3 G/DL (ref 6–8.5)
RBC # BLD AUTO: 3.82 10*6/MM3 (ref 3.77–5.28)
RH BLD: POSITIVE
SAO2 % BLDCOA: 8.9 %
SAO2 % BLDCOV: 47.4 %
SODIUM SERPL-SCNC: 136 MMOL/L (ref 136–145)
T&S EXPIRATION DATE: NORMAL
TREPONEMA PALLIDUM IGG+IGM AB [PRESENCE] IN SERUM OR PLASMA BY IMMUNOASSAY: NORMAL
TRICYCLICS UR QL SCN: NEGATIVE
WBC NRBC COR # BLD AUTO: 7.1 10*3/MM3 (ref 3.4–10.8)

## 2024-10-16 PROCEDURE — 85027 COMPLETE CBC AUTOMATED: CPT | Performed by: OBSTETRICS & GYNECOLOGY

## 2024-10-16 PROCEDURE — 51702 INSERT TEMP BLADDER CATH: CPT

## 2024-10-16 PROCEDURE — 86901 BLOOD TYPING SEROLOGIC RH(D): CPT | Performed by: OBSTETRICS & GYNECOLOGY

## 2024-10-16 PROCEDURE — 80307 DRUG TEST PRSMV CHEM ANLYZR: CPT | Performed by: OBSTETRICS & GYNECOLOGY

## 2024-10-16 PROCEDURE — 25810000003 SODIUM CHLORIDE 0.9 % SOLUTION: Performed by: OBSTETRICS & GYNECOLOGY

## 2024-10-16 PROCEDURE — 3E033VJ INTRODUCTION OF OTHER HORMONE INTO PERIPHERAL VEIN, PERCUTANEOUS APPROACH: ICD-10-PCS | Performed by: OBSTETRICS & GYNECOLOGY

## 2024-10-16 PROCEDURE — 25010000002 FENTANYL CITRATE (PF) 50 MCG/ML SOLUTION: Performed by: REGISTERED NURSE

## 2024-10-16 PROCEDURE — 25010000002 LIDOCAINE PF 2% 2 % SOLUTION: Performed by: REGISTERED NURSE

## 2024-10-16 PROCEDURE — 25810000003 LACTATED RINGERS PER 1000 ML: Performed by: OBSTETRICS & GYNECOLOGY

## 2024-10-16 PROCEDURE — 80053 COMPREHEN METABOLIC PANEL: CPT | Performed by: OBSTETRICS & GYNECOLOGY

## 2024-10-16 PROCEDURE — 25010000002 OXYTOCIN PER 10 UNITS: Performed by: OBSTETRICS & GYNECOLOGY

## 2024-10-16 PROCEDURE — 82803 BLOOD GASES ANY COMBINATION: CPT | Performed by: OBSTETRICS & GYNECOLOGY

## 2024-10-16 PROCEDURE — 59410 OBSTETRICAL CARE: CPT | Performed by: OBSTETRICS & GYNECOLOGY

## 2024-10-16 PROCEDURE — 25010000002 LIDOCAINE-EPINEPHRINE 2 %-1:200000 SOLUTION: Performed by: REGISTERED NURSE

## 2024-10-16 PROCEDURE — 0503F POSTPARTUM CARE VISIT: CPT | Performed by: OBSTETRICS & GYNECOLOGY

## 2024-10-16 PROCEDURE — 10907ZC DRAINAGE OF AMNIOTIC FLUID, THERAPEUTIC FROM PRODUCTS OF CONCEPTION, VIA NATURAL OR ARTIFICIAL OPENING: ICD-10-PCS | Performed by: OBSTETRICS & GYNECOLOGY

## 2024-10-16 PROCEDURE — 86850 RBC ANTIBODY SCREEN: CPT | Performed by: OBSTETRICS & GYNECOLOGY

## 2024-10-16 PROCEDURE — 25810000003 LACTATED RINGERS SOLUTION: Performed by: OBSTETRICS & GYNECOLOGY

## 2024-10-16 PROCEDURE — 86900 BLOOD TYPING SEROLOGIC ABO: CPT | Performed by: OBSTETRICS & GYNECOLOGY

## 2024-10-16 PROCEDURE — 25010000002 PENICILLIN G POTASSIUM PER 600000 UNITS: Performed by: OBSTETRICS & GYNECOLOGY

## 2024-10-16 PROCEDURE — C1755 CATHETER, INTRASPINAL: HCPCS | Performed by: REGISTERED NURSE

## 2024-10-16 PROCEDURE — 86780 TREPONEMA PALLIDUM: CPT | Performed by: OBSTETRICS & GYNECOLOGY

## 2024-10-16 RX ORDER — FAMOTIDINE 20 MG/1
20 TABLET, FILM COATED ORAL ONCE AS NEEDED
Status: DISCONTINUED | OUTPATIENT
Start: 2024-10-16 | End: 2024-10-16 | Stop reason: HOSPADM

## 2024-10-16 RX ORDER — FAMOTIDINE 10 MG/ML
20 INJECTION, SOLUTION INTRAVENOUS ONCE
Status: DISCONTINUED | OUTPATIENT
Start: 2024-10-16 | End: 2024-10-16 | Stop reason: HOSPADM

## 2024-10-16 RX ORDER — BUSPIRONE HYDROCHLORIDE 5 MG/1
20 TABLET ORAL 3 TIMES DAILY
Status: DISCONTINUED | OUTPATIENT
Start: 2024-10-16 | End: 2024-10-18 | Stop reason: HOSPADM

## 2024-10-16 RX ORDER — SODIUM CHLORIDE 0.9 % (FLUSH) 0.9 %
10 SYRINGE (ML) INJECTION AS NEEDED
Status: DISCONTINUED | OUTPATIENT
Start: 2024-10-16 | End: 2024-10-16 | Stop reason: HOSPADM

## 2024-10-16 RX ORDER — OXYTOCIN/0.9 % SODIUM CHLORIDE 30/500 ML
999 PLASTIC BAG, INJECTION (ML) INTRAVENOUS ONCE AS NEEDED
Status: COMPLETED | OUTPATIENT
Start: 2024-10-16 | End: 2024-10-16

## 2024-10-16 RX ORDER — ONDANSETRON 4 MG/1
4 TABLET, ORALLY DISINTEGRATING ORAL EVERY 6 HOURS PRN
Status: DISCONTINUED | OUTPATIENT
Start: 2024-10-16 | End: 2024-10-16 | Stop reason: HOSPADM

## 2024-10-16 RX ORDER — IBUPROFEN 800 MG/1
800 TABLET, FILM COATED ORAL ONCE AS NEEDED
Status: COMPLETED | OUTPATIENT
Start: 2024-10-16 | End: 2024-10-16

## 2024-10-16 RX ORDER — HYDROCODONE BITARTRATE AND ACETAMINOPHEN 10; 325 MG/1; MG/1
1 TABLET ORAL EVERY 4 HOURS PRN
Status: DISCONTINUED | OUTPATIENT
Start: 2024-10-16 | End: 2024-10-16 | Stop reason: HOSPADM

## 2024-10-16 RX ORDER — LIDOCAINE HYDROCHLORIDE 20 MG/ML
INJECTION, SOLUTION INFILTRATION; PERINEURAL
Status: DISCONTINUED
Start: 2024-10-16 | End: 2024-10-17 | Stop reason: HOSPADM

## 2024-10-16 RX ORDER — ACETAMINOPHEN 325 MG/1
650 TABLET ORAL ONCE AS NEEDED
Status: DISCONTINUED | OUTPATIENT
Start: 2024-10-16 | End: 2024-10-16 | Stop reason: HOSPADM

## 2024-10-16 RX ORDER — LIDOCAINE HYDROCHLORIDE AND EPINEPHRINE 15; 5 MG/ML; UG/ML
INJECTION, SOLUTION EPIDURAL
Status: COMPLETED | OUTPATIENT
Start: 2024-10-16 | End: 2024-10-16

## 2024-10-16 RX ORDER — OXYTOCIN/0.9 % SODIUM CHLORIDE 30/500 ML
250 PLASTIC BAG, INJECTION (ML) INTRAVENOUS ONCE AS NEEDED
Status: COMPLETED | OUTPATIENT
Start: 2024-10-18 | End: 2024-10-16

## 2024-10-16 RX ORDER — ONDANSETRON 2 MG/ML
4 INJECTION INTRAMUSCULAR; INTRAVENOUS EVERY 6 HOURS PRN
Status: DISCONTINUED | OUTPATIENT
Start: 2024-10-16 | End: 2024-10-16 | Stop reason: HOSPADM

## 2024-10-16 RX ORDER — FAMOTIDINE 10 MG/ML
20 INJECTION, SOLUTION INTRAVENOUS ONCE AS NEEDED
Status: DISCONTINUED | OUTPATIENT
Start: 2024-10-16 | End: 2024-10-16 | Stop reason: HOSPADM

## 2024-10-16 RX ORDER — FAMOTIDINE 10 MG/ML
20 INJECTION, SOLUTION INTRAVENOUS 2 TIMES DAILY PRN
Status: DISCONTINUED | OUTPATIENT
Start: 2024-10-16 | End: 2024-10-16 | Stop reason: HOSPADM

## 2024-10-16 RX ORDER — SODIUM CHLORIDE 0.9 % (FLUSH) 0.9 %
10 SYRINGE (ML) INJECTION EVERY 12 HOURS SCHEDULED
Status: DISCONTINUED | OUTPATIENT
Start: 2024-10-16 | End: 2024-10-16 | Stop reason: HOSPADM

## 2024-10-16 RX ORDER — PROMETHAZINE HYDROCHLORIDE 25 MG/1
25 TABLET ORAL EVERY 6 HOURS PRN
Status: DISCONTINUED | OUTPATIENT
Start: 2024-10-16 | End: 2024-10-16 | Stop reason: HOSPADM

## 2024-10-16 RX ORDER — ONDANSETRON 2 MG/ML
4 INJECTION INTRAMUSCULAR; INTRAVENOUS ONCE AS NEEDED
Status: DISCONTINUED | OUTPATIENT
Start: 2024-10-16 | End: 2024-10-16 | Stop reason: HOSPADM

## 2024-10-16 RX ORDER — LIDOCAINE HCL/EPINEPHRINE/PF 2%-1:200K
VIAL (ML) INJECTION AS NEEDED
Status: DISCONTINUED | OUTPATIENT
Start: 2024-10-16 | End: 2024-10-16 | Stop reason: SURG

## 2024-10-16 RX ORDER — LIDOCAINE HYDROCHLORIDE 20 MG/ML
INJECTION, SOLUTION EPIDURAL; INFILTRATION; INTRACAUDAL; PERINEURAL
Status: COMPLETED | OUTPATIENT
Start: 2024-10-16 | End: 2024-10-16

## 2024-10-16 RX ORDER — METHYLERGONOVINE MALEATE 0.2 MG/ML
INJECTION INTRAVENOUS
Status: DISCONTINUED
Start: 2024-10-16 | End: 2024-10-16 | Stop reason: WASHOUT

## 2024-10-16 RX ORDER — SODIUM CHLORIDE, SODIUM LACTATE, POTASSIUM CHLORIDE, CALCIUM CHLORIDE 600; 310; 30; 20 MG/100ML; MG/100ML; MG/100ML; MG/100ML
30 INJECTION, SOLUTION INTRAVENOUS CONTINUOUS
Status: DISCONTINUED | OUTPATIENT
Start: 2024-10-16 | End: 2024-10-17

## 2024-10-16 RX ORDER — FENTANYL/ROPIVACAINE/NS/PF 2MCG/ML-.2
PLASTIC BAG, INJECTION (ML) INJECTION
Status: COMPLETED
Start: 2024-10-16 | End: 2024-10-16

## 2024-10-16 RX ORDER — PRAZOSIN HYDROCHLORIDE 1 MG/1
2 CAPSULE ORAL NIGHTLY
Status: DISCONTINUED | OUTPATIENT
Start: 2024-10-16 | End: 2024-10-18 | Stop reason: HOSPADM

## 2024-10-16 RX ORDER — METOCLOPRAMIDE HYDROCHLORIDE 5 MG/ML
10 INJECTION INTRAMUSCULAR; INTRAVENOUS EVERY 6 HOURS PRN
Status: DISCONTINUED | OUTPATIENT
Start: 2024-10-16 | End: 2024-10-16 | Stop reason: HOSPADM

## 2024-10-16 RX ORDER — MAGNESIUM CARB/ALUMINUM HYDROX 105-160MG
30 TABLET,CHEWABLE ORAL ONCE
Status: DISCONTINUED | OUTPATIENT
Start: 2024-10-16 | End: 2024-10-16 | Stop reason: HOSPADM

## 2024-10-16 RX ORDER — FENTANYL CITRATE 50 UG/ML
INJECTION, SOLUTION INTRAMUSCULAR; INTRAVENOUS
Status: DISPENSED
Start: 2024-10-16 | End: 2024-10-17

## 2024-10-16 RX ORDER — FAMOTIDINE 20 MG/1
20 TABLET, FILM COATED ORAL 2 TIMES DAILY PRN
Status: DISCONTINUED | OUTPATIENT
Start: 2024-10-16 | End: 2024-10-16 | Stop reason: HOSPADM

## 2024-10-16 RX ORDER — TRANEXAMIC ACID 10 MG/ML
INJECTION, SOLUTION INTRAVENOUS
Status: COMPLETED
Start: 2024-10-16 | End: 2024-10-16

## 2024-10-16 RX ORDER — OXYTOCIN/0.9 % SODIUM CHLORIDE 30/500 ML
999 PLASTIC BAG, INJECTION (ML) INTRAVENOUS ONCE
Status: DISCONTINUED | OUTPATIENT
Start: 2024-10-16 | End: 2024-10-16 | Stop reason: HOSPADM

## 2024-10-16 RX ORDER — CARBOPROST TROMETHAMINE 250 UG/ML
250 INJECTION, SOLUTION INTRAMUSCULAR ONCE AS NEEDED
Status: DISCONTINUED | OUTPATIENT
Start: 2024-10-16 | End: 2024-10-16 | Stop reason: HOSPADM

## 2024-10-16 RX ORDER — OXYTOCIN/0.9 % SODIUM CHLORIDE 30/500 ML
1-16 PLASTIC BAG, INJECTION (ML) INTRAVENOUS
Status: DISCONTINUED | OUTPATIENT
Start: 2024-10-16 | End: 2024-10-16 | Stop reason: HOSPADM

## 2024-10-16 RX ORDER — FENTANYL CITRATE 50 UG/ML
INJECTION, SOLUTION INTRAMUSCULAR; INTRAVENOUS
Status: COMPLETED
Start: 2024-10-16 | End: 2024-10-16

## 2024-10-16 RX ORDER — LIDOCAINE HYDROCHLORIDE 10 MG/ML
0.5 INJECTION, SOLUTION EPIDURAL; INFILTRATION; INTRACAUDAL; PERINEURAL ONCE AS NEEDED
Status: DISCONTINUED | OUTPATIENT
Start: 2024-10-16 | End: 2024-10-16 | Stop reason: HOSPADM

## 2024-10-16 RX ORDER — MISOPROSTOL 200 UG/1
800 TABLET ORAL AS NEEDED
Status: DISCONTINUED | OUTPATIENT
Start: 2024-10-16 | End: 2024-10-16 | Stop reason: HOSPADM

## 2024-10-16 RX ORDER — PROMETHAZINE HYDROCHLORIDE 25 MG/1
12.5 TABLET ORAL EVERY 6 HOURS PRN
Status: DISCONTINUED | OUTPATIENT
Start: 2024-10-16 | End: 2024-10-16 | Stop reason: HOSPADM

## 2024-10-16 RX ORDER — TERBUTALINE SULFATE 1 MG/ML
0.25 INJECTION, SOLUTION SUBCUTANEOUS AS NEEDED
Status: DISCONTINUED | OUTPATIENT
Start: 2024-10-16 | End: 2024-10-16 | Stop reason: HOSPADM

## 2024-10-16 RX ORDER — HYDROCODONE BITARTRATE AND ACETAMINOPHEN 5; 325 MG/1; MG/1
1 TABLET ORAL EVERY 4 HOURS PRN
Status: DISCONTINUED | OUTPATIENT
Start: 2024-10-16 | End: 2024-10-16 | Stop reason: HOSPADM

## 2024-10-16 RX ORDER — KETOROLAC TROMETHAMINE 30 MG/ML
30 INJECTION, SOLUTION INTRAMUSCULAR; INTRAVENOUS ONCE AS NEEDED
Status: DISCONTINUED | OUTPATIENT
Start: 2024-10-16 | End: 2024-10-16 | Stop reason: HOSPADM

## 2024-10-16 RX ORDER — FENTANYL CITRATE 50 UG/ML
INJECTION, SOLUTION INTRAMUSCULAR; INTRAVENOUS
Status: COMPLETED | OUTPATIENT
Start: 2024-10-16 | End: 2024-10-16

## 2024-10-16 RX ORDER — OXYTOCIN/0.9 % SODIUM CHLORIDE 30/500 ML
250 PLASTIC BAG, INJECTION (ML) INTRAVENOUS CONTINUOUS
Status: DISPENSED | OUTPATIENT
Start: 2024-10-16 | End: 2024-10-16

## 2024-10-16 RX ORDER — MORPHINE SULFATE 5 MG/ML
5 INJECTION, SOLUTION INTRAMUSCULAR; INTRAVENOUS
Status: DISCONTINUED | OUTPATIENT
Start: 2024-10-16 | End: 2024-10-16 | Stop reason: HOSPADM

## 2024-10-16 RX ORDER — ACETAMINOPHEN 325 MG/1
650 TABLET ORAL EVERY 4 HOURS PRN
Status: DISCONTINUED | OUTPATIENT
Start: 2024-10-16 | End: 2024-10-16 | Stop reason: HOSPADM

## 2024-10-16 RX ORDER — LIDOCAINE HYDROCHLORIDE 20 MG/ML
INJECTION, SOLUTION EPIDURAL; INFILTRATION; INTRACAUDAL; PERINEURAL
Status: COMPLETED
Start: 2024-10-16 | End: 2024-10-16

## 2024-10-16 RX ORDER — SODIUM CHLORIDE 9 MG/ML
40 INJECTION, SOLUTION INTRAVENOUS AS NEEDED
Status: DISCONTINUED | OUTPATIENT
Start: 2024-10-16 | End: 2024-10-16 | Stop reason: HOSPADM

## 2024-10-16 RX ORDER — EPHEDRINE SULFATE 50 MG/ML
5 INJECTION, SOLUTION INTRAVENOUS
Status: DISCONTINUED | OUTPATIENT
Start: 2024-10-16 | End: 2024-10-16 | Stop reason: HOSPADM

## 2024-10-16 RX ADMIN — LIDOCAINE HYDROCHLORIDE AND EPINEPHRINE 3 ML: 15; 5 INJECTION, SOLUTION EPIDURAL at 16:37

## 2024-10-16 RX ADMIN — Medication 10 ML/HR: at 16:52

## 2024-10-16 RX ADMIN — LIDOCAINE HYDROCHLORIDE 5 ML: 20 INJECTION, SOLUTION EPIDURAL; INFILTRATION; INTRACAUDAL; PERINEURAL at 16:52

## 2024-10-16 RX ADMIN — OXYTOCIN 2 MILLI-UNITS/MIN: 10 INJECTION, SOLUTION INTRAMUSCULAR; INTRAVENOUS at 15:09

## 2024-10-16 RX ADMIN — PENICILLIN G POTASSIUM 5 MILLION UNITS: 5000000 INJECTION, POWDER, FOR SOLUTION INTRAMUSCULAR; INTRAVENOUS at 11:58

## 2024-10-16 RX ADMIN — FENTANYL CITRATE 100 MCG: 50 INJECTION, SOLUTION INTRAMUSCULAR; INTRAVENOUS at 16:52

## 2024-10-16 RX ADMIN — TRANEXAMIC ACID 1000 MG: 10 INJECTION, SOLUTION INTRAVENOUS at 20:04

## 2024-10-16 RX ADMIN — SODIUM CHLORIDE, POTASSIUM CHLORIDE, SODIUM LACTATE AND CALCIUM CHLORIDE 1000 ML: 600; 310; 30; 20 INJECTION, SOLUTION INTRAVENOUS at 16:17

## 2024-10-16 RX ADMIN — LIDOCAINE HYDROCHLORIDE,EPINEPHRINE BITARTRATE 10 ML: 20; .005 INJECTION, SOLUTION EPIDURAL; INFILTRATION; INTRACAUDAL; PERINEURAL at 19:30

## 2024-10-16 RX ADMIN — LIDOCAINE HYDROCHLORIDE AND EPINEPHRINE 2 ML: 15; 5 INJECTION, SOLUTION EPIDURAL at 16:52

## 2024-10-16 RX ADMIN — LIDOCAINE HYDROCHLORIDE,EPINEPHRINE BITARTRATE 8 ML: 20; .005 INJECTION, SOLUTION EPIDURAL; INFILTRATION; INTRACAUDAL; PERINEURAL at 19:20

## 2024-10-16 RX ADMIN — PENICILLIN G POTASSIUM 2.5 MILLION UNITS: 5000000 INJECTION, POWDER, FOR SOLUTION INTRAMUSCULAR; INTRAVENOUS at 15:05

## 2024-10-16 RX ADMIN — PRAZOSIN HYDROCHLORIDE 2 MG: 1 CAPSULE ORAL at 23:04

## 2024-10-16 RX ADMIN — OXYTOCIN 999 ML/HR: 10 INJECTION, SOLUTION INTRAMUSCULAR; INTRAVENOUS at 20:19

## 2024-10-16 RX ADMIN — IBUPROFEN 800 MG: 800 TABLET, FILM COATED ORAL at 20:51

## 2024-10-16 RX ADMIN — MISOPROSTOL 200 MCG: 200 TABLET ORAL at 20:10

## 2024-10-16 RX ADMIN — FENTANYL CITRATE 100 MCG: 50 INJECTION, SOLUTION INTRAMUSCULAR; INTRAVENOUS at 19:20

## 2024-10-16 RX ADMIN — SODIUM CHLORIDE, POTASSIUM CHLORIDE, SODIUM LACTATE AND CALCIUM CHLORIDE 30 ML/HR: 600; 310; 30; 20 INJECTION, SOLUTION INTRAVENOUS at 12:03

## 2024-10-16 RX ADMIN — MISOPROSTOL 600 MCG: 200 TABLET ORAL at 20:01

## 2024-10-16 RX ADMIN — BUSPIRONE HYDROCHLORIDE 20 MG: 5 TABLET ORAL at 23:04

## 2024-10-16 RX ADMIN — PENICILLIN G POTASSIUM 2.5 MILLION UNITS: 5000000 INJECTION, POWDER, FOR SOLUTION INTRAMUSCULAR; INTRAVENOUS at 18:54

## 2024-10-16 RX ADMIN — LIDOCAINE HYDROCHLORIDE,EPINEPHRINE BITARTRATE 10 ML: 20; .005 INJECTION, SOLUTION EPIDURAL; INFILTRATION; INTRACAUDAL; PERINEURAL at 19:00

## 2024-10-16 RX ADMIN — OXYTOCIN 250 ML/HR: 10 INJECTION, SOLUTION INTRAMUSCULAR; INTRAVENOUS at 21:23

## 2024-10-16 RX ADMIN — OXYTOCIN 250 ML/HR: 10 INJECTION, SOLUTION INTRAMUSCULAR; INTRAVENOUS at 20:19

## 2024-10-16 NOTE — PROGRESS NOTES
OB Intrapartum Note    Subjective: No complaints, Pain controlled, Comfortable with epidural.  + benzo on UDS- pt denies use    Objective:  Baseline: Normal 110-160 bpm  Variability:   Moderate/Normal (amplitude 6-25 bpm)  Accelerations: Present (32 weeks+) 15 x 15 bpm  Decelerations: None  Contractions:  Regular, q2min, q3min, Pitocin at 8milliunits/min    Cervical exam:    Dilation: 5cm    Effacement: 70%    Station: -1    Impression:    Category I  Reassuring fetus, AROM, Clear fluid, Pain controlled    Plan:   Continue pitocin  Continue to monitor  Active labor positioning  Pelvis feels clinically adequate  I have discussed in detail with patient the current plan to include, but NLT, appropriate expectations for labor and delivery, to include possible length of time expected for delivery and hospital stay.  All questions have been answered to pts satisfaction and pt desires to proceed as noted.  Specific labor and delivery desires: FOB cut cord  Discussed pos UDS- denies use, will order confirmatory        Electronically signed by Isabel Bernal DO, 10/16/24, 5:56 PM EDT.

## 2024-10-16 NOTE — ANESTHESIA PREPROCEDURE EVALUATION
Anesthesia Evaluation     Patient summary reviewed and Nursing notes reviewed   no history of anesthetic complications:   NPO Solid Status: > 8 hours  NPO Liquid Status: > 2 hours           Airway   Mallampati: II  TM distance: >3 FB  Dental - normal exam     Pulmonary - negative pulmonary ROS and normal exam   Cardiovascular - normal exam  Exercise tolerance: good (4-7 METS)    (+) hypertension, hyperlipidemia      Neuro/Psych  (+) numbness (right side siatic pain), psychiatric history Anxiety, Depression and PTSD  GI/Hepatic/Renal/Endo    (+) obesity, GERD well controlled, hepatitis C, liver disease    Musculoskeletal (-) negative ROS    Abdominal    Substance History - negative use     OB/GYN    (+) Pregnant        Other - negative ROS       ROS/Med Hx Other:                      Anesthesia Plan    ASA 2     epidural       Anesthetic plan, risks, benefits, and alternatives have been provided, discussed and informed consent has been obtained with: patient.  Pre-procedure education provided  Plan discussed with CRNA.        CODE STATUS:    Code Status (Patient has no pulse and is not breathing): CPR (Attempt to Resuscitate)  Medical Interventions (Patient has pulse or is breathing): Full

## 2024-10-16 NOTE — ANESTHESIA PROCEDURE NOTES
Labor Epidural    Pre-sedation assessment completed: 10/16/2024 4:15 PM    Patient reassessed immediately prior to procedure    Patient location during procedure: OB  Start Time: 10/16/2024 4:20 PM  Stop Time: 10/16/2024 4:52 PM  Performed By  CRNA/CAA: Angelica Ortega CRNA  Preanesthetic Checklist  Completed: patient identified, IV checked, risks and benefits discussed, surgical consent, monitors and equipment checked, pre-op evaluation and timeout performed  Additional Notes  First attempt redirected to right when contact with bone. BURT 7. Thread cath with ease however could not push test dose. I tried two new clamps that connect epidural and pulling back epidural cath. I was unable to push air or saline in cath. Cath removed.    Second attempt at same level however moved insertion side a little to the right. I still had to redirect to the right when there was contact with bone. BURT 7. Thread cath with ease. Test dose negative. Patient getting comfortable after loading dose.    Patient tolerated well and has a positive attitude.   Prep:  Pt Position:sitting  Sterile Tech:cap, gloves, sterile barrier, mask and gown  Prep:povidone-iodine 7.5% surgical scrub  Monitoring:blood pressure monitoring, continuous pulse oximetry and EKG  Epidural Block Procedure:  Approach:midline  Guidance:landmark technique and palpation technique  Location:L3-L4  Needle Type:Tuohy  Needle Gauge:17 G  Loss of Resistance Medium: air  Loss of Resistance: 7cm  Cath Depth at skin:12 cm  Paresthesia: none  Aspiration:negative  Test Dose:negative  Medication: lidocaine 1.5%-EPINEPHrine 1:200,000 (XYLOCAINE W/EPI) injection - Epidural   3 mL - 10/16/2024 4:37:00 PM   2 mL - 10/16/2024 4:52:00 PM  lidocaine PF 2% (XYLOCAINE) injection - Epidural   5 mL - 10/16/2024 4:52:00 PM  fentaNYL citrate (PF) (SUBLIMAZE) injection - Epidural   100 mcg - 10/16/2024 4:52:00 PM  Number of Attempts: 2  Post Assessment:  Dressing:occlusive dressing applied and  secured with tape  Pt Tolerance:patient tolerated the procedure well with no apparent complications  Complications:no

## 2024-10-17 PROCEDURE — 0503F POSTPARTUM CARE VISIT: CPT | Performed by: OBSTETRICS & GYNECOLOGY

## 2024-10-17 RX ORDER — ACETAMINOPHEN 325 MG/1
650 TABLET ORAL EVERY 6 HOURS PRN
Qty: 30 TABLET | Refills: 1 | Status: SHIPPED | OUTPATIENT
Start: 2024-10-17

## 2024-10-17 RX ORDER — IBUPROFEN 600 MG/1
600 TABLET, FILM COATED ORAL EVERY 6 HOURS PRN
Qty: 30 TABLET | Refills: 0 | Status: SHIPPED | OUTPATIENT
Start: 2024-10-17

## 2024-10-17 RX ORDER — VALACYCLOVIR HYDROCHLORIDE 500 MG/1
1000 TABLET, FILM COATED ORAL DAILY
Status: DISCONTINUED | OUTPATIENT
Start: 2024-10-17 | End: 2024-10-18 | Stop reason: HOSPADM

## 2024-10-17 RX ORDER — TRAMADOL HYDROCHLORIDE 50 MG/1
50 TABLET ORAL EVERY 6 HOURS PRN
Status: DISCONTINUED | OUTPATIENT
Start: 2024-10-17 | End: 2024-10-18 | Stop reason: HOSPADM

## 2024-10-17 RX ORDER — CALCIUM CARBONATE 500 MG/1
2 TABLET, CHEWABLE ORAL 3 TIMES DAILY PRN
Status: DISCONTINUED | OUTPATIENT
Start: 2024-10-17 | End: 2024-10-18 | Stop reason: HOSPADM

## 2024-10-17 RX ORDER — PROMETHAZINE HYDROCHLORIDE 25 MG/1
12.5 TABLET ORAL EVERY 4 HOURS PRN
Status: DISCONTINUED | OUTPATIENT
Start: 2024-10-17 | End: 2024-10-18 | Stop reason: HOSPADM

## 2024-10-17 RX ORDER — IBUPROFEN 600 MG/1
600 TABLET, FILM COATED ORAL EVERY 6 HOURS SCHEDULED
Status: DISCONTINUED | OUTPATIENT
Start: 2024-10-17 | End: 2024-10-18 | Stop reason: HOSPADM

## 2024-10-17 RX ORDER — SODIUM CHLORIDE 0.9 % (FLUSH) 0.9 %
1-10 SYRINGE (ML) INJECTION AS NEEDED
Status: DISCONTINUED | OUTPATIENT
Start: 2024-10-17 | End: 2024-10-18 | Stop reason: HOSPADM

## 2024-10-17 RX ORDER — POLYETHYLENE GLYCOL 3350 17 G/17G
17 POWDER, FOR SOLUTION ORAL DAILY PRN
Status: DISCONTINUED | OUTPATIENT
Start: 2024-10-17 | End: 2024-10-18 | Stop reason: HOSPADM

## 2024-10-17 RX ORDER — DOCUSATE SODIUM 100 MG/1
100 CAPSULE, LIQUID FILLED ORAL DAILY
Status: DISCONTINUED | OUTPATIENT
Start: 2024-10-17 | End: 2024-10-18 | Stop reason: HOSPADM

## 2024-10-17 RX ORDER — ACETAMINOPHEN 325 MG/1
650 TABLET ORAL EVERY 6 HOURS
Status: DISCONTINUED | OUTPATIENT
Start: 2024-10-17 | End: 2024-10-18 | Stop reason: HOSPADM

## 2024-10-17 RX ORDER — ONDANSETRON 4 MG/1
4 TABLET, ORALLY DISINTEGRATING ORAL EVERY 8 HOURS PRN
Status: DISCONTINUED | OUTPATIENT
Start: 2024-10-17 | End: 2024-10-18 | Stop reason: HOSPADM

## 2024-10-17 RX ORDER — DOCUSATE SODIUM 100 MG/1
100 CAPSULE, LIQUID FILLED ORAL 2 TIMES DAILY PRN
Qty: 60 CAPSULE | Refills: 0 | Status: SHIPPED | OUTPATIENT
Start: 2024-10-17

## 2024-10-17 RX ORDER — OXYTOCIN/0.9 % SODIUM CHLORIDE 30/500 ML
125 PLASTIC BAG, INJECTION (ML) INTRAVENOUS CONTINUOUS PRN
Status: DISCONTINUED | OUTPATIENT
Start: 2024-10-17 | End: 2024-10-18 | Stop reason: HOSPADM

## 2024-10-17 RX ADMIN — ACETAMINOPHEN 650 MG: 325 TABLET ORAL at 17:27

## 2024-10-17 RX ADMIN — VALACYCLOVIR 1000 MG: 500 TABLET ORAL at 09:14

## 2024-10-17 RX ADMIN — PRAZOSIN HYDROCHLORIDE 2 MG: 1 CAPSULE ORAL at 21:32

## 2024-10-17 RX ADMIN — ACETAMINOPHEN 650 MG: 325 TABLET ORAL at 09:14

## 2024-10-17 RX ADMIN — ACETAMINOPHEN 650 MG: 325 TABLET ORAL at 04:26

## 2024-10-17 RX ADMIN — DOCUSATE SODIUM 100 MG: 100 CAPSULE, LIQUID FILLED ORAL at 09:15

## 2024-10-17 RX ADMIN — IBUPROFEN 600 MG: 600 TABLET, FILM COATED ORAL at 06:07

## 2024-10-17 RX ADMIN — IBUPROFEN 600 MG: 600 TABLET, FILM COATED ORAL at 17:27

## 2024-10-17 RX ADMIN — BUSPIRONE HYDROCHLORIDE 20 MG: 5 TABLET ORAL at 21:31

## 2024-10-17 RX ADMIN — IBUPROFEN 600 MG: 600 TABLET, FILM COATED ORAL at 12:29

## 2024-10-17 RX ADMIN — BUSPIRONE HYDROCHLORIDE 20 MG: 5 TABLET ORAL at 09:15

## 2024-10-17 RX ADMIN — BUSPIRONE HYDROCHLORIDE 20 MG: 5 TABLET ORAL at 17:27

## 2024-10-17 RX ADMIN — FLUOXETINE HYDROCHLORIDE 60 MG: 20 CAPSULE ORAL at 09:14

## 2024-10-17 NOTE — PLAN OF CARE
Problem: Adult Inpatient Plan of Care  Goal: Plan of Care Review  Outcome: Progressing  Goal: Patient-Specific Goal (Individualized)  Outcome: Progressing  Goal: Absence of Hospital-Acquired Illness or Injury  Outcome: Progressing  Goal: Optimal Comfort and Wellbeing  Outcome: Progressing  Goal: Readiness for Transition of Care  Outcome: Progressing     Problem: Suicide Risk  Goal: Absence of Self-Harm  Outcome: Progressing     Problem: Breastfeeding  Goal: Effective Breastfeeding  Outcome: Progressing     Problem: Postpartum (Vaginal Delivery)  Goal: Successful Parent Role Transition  Outcome: Progressing  Goal: Hemostasis  Outcome: Progressing  Goal: Absence of Infection Signs and Symptoms  Outcome: Progressing  Goal: Anesthesia/Sedation Recovery  Outcome: Progressing  Goal: Optimal Pain Control and Function  Outcome: Progressing  Intervention: Prevent or Manage Pain  Recent Flowsheet Documentation  Taken 10/16/2024 2330 by Pastora Arceo, RNA  Perineal Care:   absorbent brief/pad changed   perineal hygiene encouraged   perineal spray bottle/warm water use encouraged   perineum cleansed  Taken 10/16/2024 2145 by Pastora Arceo, RNA  Perineal Care: absorbent brief/pad changed  Goal: Effective Urinary Elimination  Outcome: Progressing   Goal Outcome Evaluation:

## 2024-10-17 NOTE — DISCHARGE INSTRUCTIONS
DR. RUFF'S POSTPARTUM DISCHARGE PRECAUTIONS and Answers to FAQs     NO SEX for SIX weeks.     NO TUB BATH or POOL for TWO week(s), shower only.  Sitz baths are fine.     STITCHES (if present):  wash them daily in the shower with soap and water (any type of soap is fine, it does not need to be antibacterial soap).  It is ok to gently put your finger in and around the vaginal area.  Look at your stitches (the ones on the outside) when you get home.  You will then know what is normal and can have a point of reference to compare it to if you start to have concerns.  REDNESS, PUS, increase in PAIN, FEVER or CHILLS are all reasons to be seen our office immediately.  Go to the ER, if it is after hours or a weekend.       VAGINAL BLEEDING:  may continue on and off over the next several weeks after delivery and may increase slightly once you go home.  You should not be bleeding more than 1 large pad soaked every hour or two.  Clots (even the size of a lemon or larger) may be normal as long as the bleeding is not heavy and the clots do not continue.       FEVER or CHILLS or NOT FEELING WELL: call our office.  If the office is closed, you need to be seen in acute care or ER.       CHEST PAIN or SHORTNESS OF BREATH/AIR: you need to GO TO THE NEAREST ER or CALL 911.      SWELLING: can increase over the next 7-10 days and then should slowly improve.  Your legs/ankles should be fairly similar in size.  A red, painful, hot, swollen leg (usually just one side) can be a sign of a blood clot and should be evaluated immediately.  Call our office.  If it is after hours or a weekend, you must be seen IMMEDIATELY IN THE ER.      ELEVATED BLOOD PRESSURE:  you need to contact us if you are having  persistent elevated BP systolic (top number) more than 155 or diastolic (bottom number) more than 95, or a headache (not relieved with rest, hydration or over the counter pain reliever), an increase in your swelling (usually hands and face),  changes in your vision (typically flashing white or black spots) or severe persistent pain in the location of the upper right side of your belly (under your right breast).  Call our office or go to ER if after hours or a weekend.     LACTATION QUESTIONS or CONCERNS?  Call [facility] Lactation Support [phone].     WORK and SCHOOL TIME OFF: depends on your specific delivery type, surrounding circumstances, and your work insurance/school rules.  If you have questions, please call Raisa Villanueva at [phone] (ext. [phone]).  Or email Raisa at [email or web address].  They will assist in required paperwork for you and/or family members.      Any further QUESTIONS or CONCERNS, please call [facility] YESSICA Amato at [phone].

## 2024-10-17 NOTE — LACTATION NOTE
LC in to see this P6 patient. She states she  her other 3 children. She states this baby is latching well with no pain. She continues to be exclusive and is confident with how lactation is going. LC offered to assist or answer questions should she need any help.

## 2024-10-17 NOTE — PLAN OF CARE
Problem: Adult Inpatient Plan of Care  Goal: Plan of Care Review  Outcome: Progressing  Goal: Patient-Specific Goal (Individualized)  Outcome: Progressing  Goal: Absence of Hospital-Acquired Illness or Injury  Outcome: Progressing  Intervention: Identify and Manage Fall Risk  Recent Flowsheet Documentation  Taken 10/17/2024 1726 by Caroline Arias RN  Safety Promotion/Fall Prevention: safety round/check completed  Taken 10/17/2024 1430 by Caroline Arias RN  Safety Promotion/Fall Prevention: safety round/check completed  Taken 10/17/2024 1228 by Caroline Arias RN  Safety Promotion/Fall Prevention: safety round/check completed  Taken 10/17/2024 0913 by Caroline Arias RN  Safety Promotion/Fall Prevention: safety round/check completed  Taken 10/17/2024 0730 by Caroline Arias RN  Safety Promotion/Fall Prevention: safety round/check completed  Intervention: Prevent Skin Injury  Recent Flowsheet Documentation  Taken 10/17/2024 0914 by Caroline Arias RN  Body Position: position changed independently  Goal: Optimal Comfort and Wellbeing  Outcome: Progressing  Intervention: Monitor Pain and Promote Comfort  Recent Flowsheet Documentation  Taken 10/17/2024 0914 by Caroline Arias RN  Pain Management Interventions:   care clustered   pillow support provided   position adjusted   quiet environment facilitated   relaxation techniques promoted  Goal: Readiness for Transition of Care  Outcome: Progressing     Problem: Suicide Risk  Goal: Absence of Self-Harm  Outcome: Progressing     Problem: Breastfeeding  Goal: Effective Breastfeeding  Outcome: Progressing     Problem: Postpartum (Vaginal Delivery)  Goal: Successful Parent Role Transition  Outcome: Progressing  Goal: Hemostasis  Outcome: Progressing  Goal: Absence of Infection Signs and Symptoms  Outcome: Progressing  Goal: Anesthesia/Sedation Recovery  Outcome: Progressing  Intervention: Optimize Anesthesia Recovery  Recent Flowsheet Documentation  Taken 10/17/2024 1726  by Hugo, Caroline, RN  Safety Promotion/Fall Prevention: safety round/check completed  Taken 10/17/2024 1430 by Caroline Arias RN  Safety Promotion/Fall Prevention: safety round/check completed  Taken 10/17/2024 1228 by Caroline Arias RN  Safety Promotion/Fall Prevention: safety round/check completed  Taken 10/17/2024 0913 by Caroline Arias RN  Safety Promotion/Fall Prevention: safety round/check completed  Taken 10/17/2024 0730 by Caroline Arias RN  Safety Promotion/Fall Prevention: safety round/check completed  Goal: Optimal Pain Control and Function  Outcome: Progressing  Intervention: Prevent or Manage Pain  Recent Flowsheet Documentation  Taken 10/17/2024 0914 by Caroline Arias RN  Pain Management Interventions:   care clustered   pillow support provided   position adjusted   quiet environment facilitated   relaxation techniques promoted  Goal: Effective Urinary Elimination  Outcome: Progressing   Goal Outcome Evaluation:

## 2024-10-17 NOTE — PROGRESS NOTES
1900 I was called that patient was having pain. I gave her a bolus dose of lidocaine in epidural.    1914 I was called that the bolus did not help. I gave her fentanyl and lidocaine in epidural. Epidural has been working all day and RN stated she checked it to make sure it did not come out. Patient has had cervix changes and dilating quickly.    1930 Patient continues to be in pain. I gave her one more bolus. I brought a new bag of ropiv/fent gtt to change bag.     We decided to redo epidural because bolus doses are not working and patient is having pain 10/10.    When I sat patient up I noted the left lower corner of the tape is pulled up and epidural cath has been pulled out. Epidural complete removed. Tip intact. Starting procedure of new epidural placement in the level about where epidural was previously placed. At this time patient is scream in pain and unable to get into position for epidural. She is checked by RN and cervix is complete. I abort attempt to place CSE.

## 2024-10-17 NOTE — DISCHARGE SUMMARY
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 Isabel Bernal DO  Anesthesia: Epidural, was not working at the time of delivery it had fallen out  Delivery Type:   Perineum : Intact  Feeding method: Breast    Infant: male infant; [place]    Weight: 3860 g (8 lb 8.2 oz)     APGARS: 7  @ 1 minute / 3  @ 5 minutes    Hospital Course/Significant Findings:  Patient arrived for scheduled IOL, Pitocin and AROM.  Progressed rapidly to complete.  Immediately before complete, epidural fell out and there was not enough time for replacement of epidural.  Pushed once.  Uncomplicated  and PP      Results from last 7 days   Lab Units 10/16/24  1057   WBC 10*3/mm3 7.10   HEMOGLOBIN g/dL 12.2   HEMATOCRIT % 35.4   PLATELETS 10*3/mm3 230       Results from last 7 days   Lab Units 10/16/24  1057   GLUCOSE mg/dL 134*   CREATININE mg/dL 0.75   SODIUM mmol/L 136   POTASSIUM mmol/L 3.4*   CHLORIDE mmol/L 105   AST (SGOT) U/L 18   ALT (SGPT) U/L 12       Results from last 7 days   Lab Units 10/16/24  1057   TREPONEMA PALLIDUM AB TOTAL  Non-Reactive       Results for orders placed or performed during the hospital encounter  of 10/16/24   CBC (No Diff)    Collection Time: 10/16/24 10:57 AM    Specimen: Blood   Result Value Ref Range    WBC 7.10 3.40 - 10.80 10*3/mm3    RBC 3.82 3.77 - 5.28 10*6/mm3    Hemoglobin 12.2 12.0 - 15.9 g/dL    Hematocrit 35.4 34.0 - 46.6 %    MCV 92.7 79.0 - 97.0 fL    MCH 31.9 26.6 - 33.0 pg    MCHC 34.5 31.5 - 35.7 g/dL    RDW 13.3 12.3 - 15.4 %    RDW-SD 44.1 37.0 - 54.0 fl    MPV 11.3 6.0 - 12.0 fL    Platelets 230 140 - 450 10*3/mm3   Treponema pallidum AB w/Reflex RPR    Collection Time: 10/16/24 10:57 AM    Specimen: Blood   Result Value Ref Range    Treponemal AB Total Non-Reactive Non-Reactive   Comprehensive Metabolic Panel    Collection Time: 10/16/24 10:57 AM    Specimen: Blood   Result Value Ref Range    Glucose 134 (H) 65 - 99 mg/dL    BUN 5 (L) 6 - 20 mg/dL    Creatinine 0.75 0.57 - 1.00 mg/dL    Sodium 136 136 - 145 mmol/L    Potassium 3.4 (L) 3.5 - 5.2 mmol/L    Chloride 105 98 - 107 mmol/L    CO2 17.4 (L) 22.0 - 29.0 mmol/L    Calcium 8.6 8.6 - 10.5 mg/dL    Total Protein 7.3 6.0 - 8.5 g/dL    Albumin 3.8 3.5 - 5.2 g/dL    ALT (SGPT) 12 1 - 33 U/L    AST (SGOT) 18 1 - 32 U/L    Alkaline Phosphatase 135 (H) 39 - 117 U/L    Total Bilirubin 0.3 0.0 - 1.2 mg/dL    Globulin 3.5 gm/dL    A/G Ratio 1.1 g/dL    BUN/Creatinine Ratio 6.7 (L) 7.0 - 25.0    Anion Gap 13.6 5.0 - 15.0 mmol/L    eGFR 110.0 >60.0 mL/min/1.73   Type & Screen    Collection Time: 10/16/24 10:57 AM    Specimen: Blood   Result Value Ref Range    ABO Type O     RH type Positive     Antibody Screen Negative     T&S Expiration Date 10/19/2024 11:59:59 PM    Urine Drug Screen - Urine, Clean Catch    Collection Time: 10/16/24 10:58 AM    Specimen: Urine, Clean Catch   Result Value Ref Range    THC, Screen, Urine Negative Negative    Phencyclidine (PCP), Urine Negative Negative    Cocaine Screen, Urine Negative Negative    Methamphetamine, Ur Negative Negative    Opiate Screen Negative Negative    Amphetamine Screen, Urine Negative  Negative    Benzodiazepine Screen, Urine Positive (A) Negative    Tricyclic Antidepressants Screen Negative Negative    Methadone Screen, Urine Negative Negative    Barbiturates Screen, Urine Negative Negative    Oxycodone Screen, Urine Negative Negative    Buprenorphine, Screen, Urine Negative Negative   Fentanyl, Urine - Urine, Clean Catch    Collection Time: 10/16/24 10:58 AM    Specimen: Urine, Clean Catch   Result Value Ref Range    Fentanyl, Urine Negative Negative   Blood Gas, Venous, Cord    Collection Time: 10/16/24  8:13 PM    Specimen: Umbilical Cord; Cord Blood Venous   Result Value Ref Range    pH, Cord Venous 7.338 7.260 - 7.400 pH Units    pCO2, Cord Venous 40.6 35.0 - 51.3 mm Hg    pO2, Cord Venous 27.4 19.0 - 39.0 mm Hg    HCO3, Cord Venous 21.8 mmol/L    Base Excess, Cord Venous -3.8 -30.0 - 30.0 mmol/L    O2 Sat, Cord Venous 47.4 %    Barometric Pressure for Blood Gas 750.2000 mmHg   Blood Gas, Arterial, Cord    Collection Time: 10/16/24  8:14 PM    Specimen: Umbilical Cord; Cord Blood Arterial   Result Value Ref Range    pH, Cord Arterial 7.24 7.18 - 7.34 pH Units    pCO2, Cord Arterial 58.8 (H) 33.0 - 49.0 mmHg    pO2, Cord Arterial 11.4 mmHg    HCO3, Cord Arterial 25.1 mmol/L    Base Exc, Cord Arterial -3.5 (L) -2.0 - 2.0 mmol/L    O2 Sat, Cord Arterial 8.9 %    Barometric Pressure for Blood Gas 752.0000 mmHg       Lab Results   Component Value Date    CREATININEUR 397.0 07/31/2024    PROTEINUR 25.7 07/31/2024    UTPCR 0.06 07/31/2024            Discharge:         Discharge Medications        New Medications        Instructions Start Date   acetaminophen 325 MG tablet  Commonly known as: Tylenol   650 mg, Oral, Every 6 Hours PRN      docusate sodium 100 MG capsule  Commonly known as: Colace   100 mg, Oral, 2 Times Daily PRN      ibuprofen 600 MG tablet  Commonly known as: ADVIL,MOTRIN   600 mg, Oral, Every 6 Hours PRN             Continue These Medications        Instructions Start Date    busPIRone 10 MG tablet  Commonly known as: BUSPAR   20 mg, 3 Times Daily      FLUoxetine 20 MG capsule  Commonly known as: PROzac   60 mg, Oral, Daily      prazosin 2 MG capsule  Commonly known as: MINIPRESS   2 mg, Every Night at Bedtime      PRENATAL VITAMIN PO   1 tablet, Daily      valACYclovir 1000 MG tablet  Commonly known as: VALTREX   1,000 mg, Oral, Daily                 Disposition: Home  Diet: Regular    Activity: Pelvic rest 6 weeks    Condition at discharge: Good    Follow up with: Dr. Isabel Bernal DO or provider of her choice    Follow up in: BP check 1 week, follow-up psychiatry within 2 weeks       Complications: None       Signature: Electronically signed by Isabel Bernal DO, 10/18/24, 3:36 PM EDT.        [facility] AND DELIVERY  [address] [place] [address] [facility] [facility] [address]  Dept: [phone]  Dept Fax: [phone]  Loc: [phone]

## 2024-10-17 NOTE — PROGRESS NOTES
PostPartum/PostOp PROGRESS NOTE        Subjective:  Patient has no complaints  Pain controlled  Ambulating  Urinating spontaneously  Lochia decreasing, no bleeding concerns    Objective:     Temp:  [97.9 °F (36.6 °C)-98.8 °F (37.1 °C)] 97.9 °F (36.6 °C)  Heart Rate:  [] 76  Resp:  [16-20] 16  BP: (106-143)/(50-89) 115/62  General- NAD, alert and oriented, appropriate  Psych- Normal mood, good memory  Cardiovascular/Respiratory- Not examined  Abdomen- Fundus firm, non tender and Fundus at U  Extremties/DTRs- No edema, bilaterally equal, no signs of DVT    Results from last 7 days   Lab Units 10/16/24  1057   WBC 10*3/mm3 7.10   HEMOGLOBIN g/dL 12.2   HEMATOCRIT % 35.4   PLATELETS 10*3/mm3 230       Results from last 7 days   Lab Units 10/16/24  1057   GLUCOSE mg/dL 134*   CREATININE mg/dL 0.75   SODIUM mmol/L 136   POTASSIUM mmol/L 3.4*   CHLORIDE mmol/L 105   AST (SGOT) U/L 18   ALT (SGPT) U/L 12            Results from last 7 days   Lab Units 10/16/24  1057   TREPONEMA PALLIDUM AB TOTAL  Non-Reactive     Assessment:    Post-partum/postop Day:  1  The patient is currently breastfeeding.      Plan:   Routine postpartum/postop care  Remove IV  Shower  Sitz baths  DC meds reviewed  Follow up scheduled  PP/PO precautions given  OB Hospitalist will see pt tomorrow and until discharge.  Plan discharge TOMORROW              Electronically signed by Isabel Bernal DO, 10/17/24, 6:56 AM EDT.

## 2024-10-17 NOTE — PLAN OF CARE
Problem: Labor  Goal: Hemostasis  Outcome: Adequate for Care Transition  Goal: Stable Fetal Wellbeing  Outcome: Adequate for Care Transition  Goal: Effective Progression to Delivery  Outcome: Adequate for Care Transition  Goal: Absence of Infection Signs and Symptoms  Outcome: Adequate for Care Transition  Goal: Acceptable Pain Control  Outcome: Adequate for Care Transition  Goal: Normal Uterine Contraction Pattern  Outcome: Adequate for Care Transition   Goal Outcome Evaluation: Adequate for transition to [facility]

## 2024-10-17 NOTE — ANESTHESIA POSTPROCEDURE EVALUATION
Patient: Abbe Amos    Procedure Summary       Date: 10/16/24 Room / Location:     Anesthesia Start: 1615 Anesthesia Stop: 1950    Procedure: LABOR ANALGESIA Diagnosis:     Scheduled Providers:  Provider: Angelica Ortega CRNA    Anesthesia Type: epidural ASA Status: 2            Anesthesia Type: epidural    Vitals  Vitals Value Taken Time   /62 10/17/24 0501   Temp 36.6 °C (97.88 °F) 10/17/24 0501   Pulse 76 10/17/24 0501   Resp 16 10/17/24 0500   SpO2 99 % 10/16/24 1956           Post Anesthesia Care and Evaluation    Patient location during evaluation: bedside  Patient participation: complete - patient participated  Level of consciousness: awake  Pain management: adequate    Airway patency: patent  Anesthetic complications: No anesthetic complications  PONV Status: controlled  Cardiovascular status: acceptable and stable  Respiratory status: acceptable  Hydration status: acceptable  Post Neuraxial Block status: Motor and sensory function returned to baseline and No signs or symptoms of PDPH

## 2024-10-17 NOTE — L&D DELIVERY NOTE
VAGINAL DELIVERY NOTE          Date: 10/16/2024  Time:  7:50 PM  GA: 39w3d    Infant: male infant   3860 g (8 lb 8.2 oz)    APGARS: 7  @ 1 minute / 3  @ 5 minutes/ 8 @ 10 minutes    Delivery: I was called to the patient room for delivery.  I arrived approximately 1 minute later.  Patient was extremely uncomfortable, she had been sitting up for replacement of epidural and progressed to complete.  Prior epidural had came out.  Patient was offered fentanyl and accepted.  Lidocaine was ordered for local infiltration of the perineum.  However patient delivered prior to either of them being obtained for administration.  The patient progressed rapidly to complete, complete and pushed for once to deliver a viable infant in cephalic presentation weighing the above weight and above Apgars.  There was no nuchal cord.   The mouth and nares were bulb suctioned on the perineum.  The left anterior and right posterior shoulders were easily delivered without traction.  The remainder of the body delivered.  The infant was vigorous.  Delayed cord clamping for 60 seconds.  The cord was then clamped and cut.  Infant was placed on the maternal chest for recovery.  The placenta delivered intact with the assistance of fundal massage and maternal pushing efforts.      On full evaluation of the perineum, vagina, cervix and rectum no repair was needed.  Intact perineum.   External anal sphincter was intact.  A total of 800 mcg of Cytotec was given orally and SL, and TXA, to assist with uterine tone.    Counts were correct.     Estimated Blood Loss: 300 mls    Complications: None         Electronically signed by Isabel Bernal DO, 10/16/24, 9:32 PM EDT.       [facility] LABOR AND DELIVERY  [address] [place] [address] [place] [address]  Dept: [phone]  Dept Fax: [phone]  Loc: [phone]

## 2024-10-18 VITALS
HEIGHT: 66 IN | SYSTOLIC BLOOD PRESSURE: 142 MMHG | TEMPERATURE: 97.5 F | HEART RATE: 86 BPM | BODY MASS INDEX: 37.61 KG/M2 | OXYGEN SATURATION: 99 % | WEIGHT: 234 LBS | RESPIRATION RATE: 20 BRPM | DIASTOLIC BLOOD PRESSURE: 70 MMHG

## 2024-10-18 LAB
HCV RNA SERPL NAA+PROBE-ACNC: NORMAL IU/ML
REF LAB TEST REF RANGE: NORMAL

## 2024-10-18 RX ADMIN — ACETAMINOPHEN 650 MG: 325 TABLET ORAL at 10:35

## 2024-10-18 RX ADMIN — VALACYCLOVIR 1000 MG: 500 TABLET ORAL at 08:16

## 2024-10-18 RX ADMIN — BUSPIRONE HYDROCHLORIDE 20 MG: 5 TABLET ORAL at 08:16

## 2024-10-18 RX ADMIN — DOCUSATE SODIUM 100 MG: 100 CAPSULE, LIQUID FILLED ORAL at 08:16

## 2024-10-18 RX ADMIN — FLUOXETINE HYDROCHLORIDE 60 MG: 20 CAPSULE ORAL at 08:15

## 2024-10-18 NOTE — LACTATION NOTE
LC in to follow up with breastfeeding progress. LC noted that patient continues to exclusively breastfeed. Infant's weight loss and output are wdl. She has no nipple soreness and no questions at this visit. Patient is planning on discharge today. LC discussed normal infant output patterns to expect and if infant is not waking by 3 hours to wake and feed using measures shown in the hospital. LC discussed checking to make sure new medications are safe to breastfeed. LC discussed alcohol use and cigarette/second hand smoke around baby and breastfeeding and discussed the impact of street drugs on infants and breastfeeding. LC used the page in the breastfeeding guide to discuss harmful effects of these. Breastfeeding/Lactation expectations and anticipatory guidance discussed for the next two weeks . LC discussed nipple care, plugged ducts, engorgement, and breast infection. LC encouraged mom to see pediatrician two days from discharge for follow up. Patient has a breastpump for home use and LC discussed good pumping guidelines and normal expectations with pumping and storage and preparation of ebm for feedings. LC discussed breastfeeding/lactation resources including the local breastfeeding support group after discharge and when to call the doctor. Patient showed good understanding.

## 2024-10-18 NOTE — NURSING NOTE
At discharge TRINIADD Cristobal noted the car seat did not have a chest piece. Car seat was given to parents by clarity. Nurse talked through options with parents who stated financially they were unable to purchase a different car seat at this time. We gave parents a new car seat and educated on usage.

## 2024-10-18 NOTE — PLAN OF CARE
Goal Outcome Evaluation:  Plan of Care Reviewed With: patient        Progress: no change  Outcome Evaluation: Patient assessment WNL, breastfeeding well, Pain well controlled.

## 2024-10-18 NOTE — PROGRESS NOTES
[facility]  Vaginal Delivery Progress Note    Subjective   Postpartum Day 2: Vaginal Delivery    The patient feels well.  Her pain is well controlled with nonsteroidal anti-inflammatory drugs and Tylenol.   She is ambulating well.  Patient describes her bleeding as moderate lochia.    Breastfeeding: without difficulty.    Objective     Vital Signs Range for the last 24 hours  Temperature: Temp:  [98.1 °F (36.7 °C)-98.6 °F (37 °C)] 98.1 °F (36.7 °C)   Temp Source: Temp src: Oral   BP: BP: (124-137)/(55-72) 137/72   Pulse: Heart Rate:  [80-91] 82   Respirations: Resp:  [16-18] 18       Physical Exam:  General:  no acute distress.  Abdomen: abdomen is soft without significant tenderness, masses, organomegaly or guarding.   Fundus: appropriate, firm, non tender, small lochia   Extremities: normal, atraumatic, no cyanosis, and trace edema.     Rubella:   Rubella Antibodies, IgG   Date Value Ref Range Status   2024 2.75 Immune >0.99 index Final     Comment:                                     Non-immune       <0.90                                  Equivocal  0.90 - 0.99                                  Immune           >0.99     Rh Status:    RH type   Date Value Ref Range Status   10/16/2024 Positive  Final     Immunizations: There is no immunization history for the selected administration types on file for this patient.    Assessment & Plan       Encounter for induction of labor    + UDS, benzo     (normal spontaneous vaginal delivery)      Abbe Amos is Day 2  post-partum  Vaginal, Spontaneous  .      Plan:  Continue current care.  Doing well;  d/c home      Electronically signed by Lucrecia Rogel MD, 10/18/24, 8:53 AM EDT.

## 2024-10-28 ENCOUNTER — PATIENT OUTREACH (OUTPATIENT)
Dept: LABOR AND DELIVERY | Facility: HOSPITAL | Age: 29
End: 2024-10-28
Payer: COMMERCIAL

## 2024-10-28 ENCOUNTER — TELEPHONE (OUTPATIENT)
Dept: LACTATION | Facility: HOSPITAL | Age: 29
End: 2024-10-28
Payer: COMMERCIAL

## 2024-10-28 NOTE — OUTREACH NOTE
Motherhood Connection    Postpartum EPDS sent via Christine Garcia RN  Maternity Nurse Navigator    10/28/2024, 12:06 EDT

## 2024-10-28 NOTE — TELEPHONE ENCOUNTER
Pt states breastfeeding is going good, she is pumping and gets about 8oz each time she pumps. Baby rarely gets EBM from bottle but when she does, she is taking about 3oz Baby is gaining wt. Pt has no questions or concerns, encouraged to contact LC as needed.

## 2024-10-30 ENCOUNTER — PATIENT OUTREACH (OUTPATIENT)
Dept: LABOR AND DELIVERY | Facility: HOSPITAL | Age: 29
End: 2024-10-30
Payer: COMMERCIAL

## 2024-10-30 NOTE — OUTREACH NOTE
Motherhood Connection  Postpartum Check-In    Questions/Answers      Flowsheet Row Responses   Visit Setting Telephone   Best Method for Contacting Cell   OB Discharge Note Reviewed  Reviewed   OB Discharge Navigator Reviewed  Reviewed   OB Discharge Medications Reviewed  Reviewed    discharged home with mother? Yes   Current Pain Levels 0-10 0   At Rest Pain Levels 0-10 0   Pain level with activity 0-10 0   Acceptable Pain Level 0-10 0   Verbalized Emotional State Acceptance   Family/Support Network Family, Spouse   Level of Involvement in Care Attentive   Do you feel comfortable in your relationship with your baby? Yes   Have members of your household adjusted to your baby? Yes   Is the baby's father supportive and/or involved with the baby? Yes   How does your partner feel about the baby? Happy, Involved   Do you feel safe at home, school and work? Yes   Are you in a relationship with someone who threatens you or hurts you? No   Do you have the resources to keep yourself and your baby healthy and safe? Yes   Lochia (per patient report) Similar to Menstrual Flow   Number of pads per day 3   Lochia Odor None   Is patient breastfeeding? Yes, pumping   Frequency PRN   Pumping Quantity 8oz   Postpartum Depression Screening Education Education Provided   Doctor Appointments: Education Provided   Breastfeeding Education Education Provided   Family Planning Education Education Provided   Postpartum Care Education Education Provided   Followup Appointments Made Yes   Well Child Visit Appointments Made Yes   Appointment Date 24   Did you complete the visit? Yes   Were there any specific concerns? No   Umbilical Cord No reported signs or symptoms   Was the baby circumcised? Yes   Circumcision care and signs/symptoms to report Reviewed   Infant Feeding Method Breast   Frequency of feedings Q3h   Number of wet diapers x 24 hours 10   Last BM x 24 hours 8   What safe sleep surface is available? Pack N Play   Are  there stuffed animals, toys, pillows, quilts, blankets, wedges, positioners, bumpers or other loose bedding in the infant's sleeping environment? No   Where does the baby usually sleep? Pack N Play   Does the baby ever share a sleep surface with a sibling, adult or pet? No   Does the baby ever share a sleep surface in a bed, couch, recliner or other? No   What position do you place your baby to sleep for naps? Back   What position do you place your baby to sleep at night Back   Are you and/or other caregivers smoking inside or outside the baby's home? No   Is the infant dressed appropiately for the temperature of the home? Yes   Do you use a clean, dry pacifier that is not attached to a string or stuffed animal? Yes            Review of Systems   All other systems reviewed and are negative.    Most Recent [place]  Depression Scale Score (EPDS)    Performed by a clinician: 9 (10/16/2024 11:44 AM)    Received via [employer] questionnaire: 10 (10/28/2024)     Doing well. No current questions, needs or concerns.     5 Ps Screen  complete      Renetta Garcia RN  Maternity Nurse Navigator    10/30/2024, 11:59 EDT

## 2024-11-06 ENCOUNTER — PATIENT OUTREACH (OUTPATIENT)
Dept: CALL CENTER | Facility: HOSPITAL | Age: 29
End: 2024-11-06
Payer: COMMERCIAL

## 2024-11-06 NOTE — OUTREACH NOTE
Motherhood Connection Survey      Flowsheet Row Responses   [facility] facility patient discharged from? [facility]   Week 1 attempt successful? Yes   Call start time 1131   Call end time 1137   Baby sex Boy   [place] discharged home with mother? Yes   Baby sex Boy   Delivery type Vaginal   Emotional state Acceptance   Family support Yes   Do you have all necessary resources to care for you and your baby?  Yes   Have members of your household adjusted to your baby? Yes   Did you have any problems with pre-eclampsia during this pregnancy? No   Do you have any of the following: Headache  [occasional headaches]   Lochia amount Light   Lochia per patient report Serosa   Additional comments vaginal   Feeding Method Combination   Frequency Mom pumps breastmilk and baby will baby take 3 ounces at each feeding   Breast Condition No   Nipple Condition No   Signs baby is ready to eat Rooting, Crying   Feeding tolerance Weight gain, Adequate pause for breath, Wet/dirty diapers   Number of wet diapers x 24 hours 6-8   Last BM x 24 hours 6-8   Umbilical Cord No reported signs or symptoms   Was the baby circumcised? Yes   Circumcision care and signs/symptoms to report Reviewed   Where does the baby usually sleep? Pack N Play   Are there stuffed animals, toys, pillows, quilts, blankets, wedges, positioners, bumpers or other loose bedding in the infant's sleeping environment? No   Does the baby ever share a sleep surface in a bed, couch, recliner or other? No   What position do you lay your baby down to sleep? Back   Are you and/or other caregivers smoking inside or outside the baby's home? No   Mom appointment comments: Mom has yasmin ppt on  and baby have been for followups   Call completed? Yes              Robby SOLIS - Registered Nurse

## 2024-11-11 NOTE — PROGRESS NOTES
"POSTPARTUM Follow Up Visit      Chief Complaint   Patient presents with    Postpartum Care     HPI:    Date of delivery: 10/16/24  Delivery type:            Perineum : Intact  Delivering Provider:   Dr. Isabel Bernal        Feeding: Breast and bottle  Pain:  no  Vaginal Bleeding:  no  Plans for BC:  Sterilization/tubal    Depressed/Anxious:  yes, doing well and stable  EPDS score: 9  #10: 0  History of anxiety depression and PTSD along with prior suicidal ideation.  Followed by [facility].  BuSpar 20 mg 3 times daily along with Prozac 20 mg daily and Minipress 2 mg at bedtime    Weight at last OB OV:  234lb  Last pap date and result: IGP,CtNgTv,Apt HPV,rfx 16 / 18,45 (2024 10:10) Pap negative, HPV negative    Discharge Summary by Isabel Bernal DO (10/16/2024 21:43)    Chronic hypertension in pregnancy-no medications  UDS + benzo- confirmation was inadvert cancelled by lab.       PHYSICAL EXAM:  BP (!) 154/110   Pulse 67   Ht 167.6 cm (66\")   Wt 103 kg (227 lb)   Breastfeeding Yes   BMI 36.64 kg/m²  Not found.  /107  General- NAD, alert and oriented, appropriate  Psych- Normal mood, good memory, good eye contact      Abdomen- Soft, non distended, non tender, no masses     Chaperone present during pelvic exam.  External genitalia- Normal.    Urethra/Bladder/Vagina- Normal, no masses, non-tender       Cervix- Normal, no lesions, no discharge, no CMT  Uterus- Normal size, shape & consistency.  Non tender, mobile.  Adnexa- Normal, no mass, non-tender    Lymphatic- No palpable groin nodes  Extremities- No edema.  DTR +1.  No clonus.     ASSESSMENT AND PLAN:  Diagnoses and all orders for this visit:    1. Postpartum follow-up (Primary)    2. Birth control counseling  -     norethindrone (MICRONOR) 0.35 MG tablet; Take 1 tablet by mouth Daily.  Dispense: 90 tablet; Refill: 4    3. Request for sterilization  Comments:  Will plan Micronor until able to perform sterilization  Overview:  Tubal consents x 2 " 11/14/2024       4. + UDS, benzo  Comments:  Today my staff confirmed that the lab canceled the confirmation and will be reordered today  Overview:  10/16/2024 confirmatory sent    Orders:  -     Cancel: Benzodiazepines Confirm, Urine - Urine, Clean Catch; Future  -     BENZODIAZEPINE CONFIRMATION,URINE - Urine, Clean Catch; Future  -     BENZODIAZEPINE CONFIRMATION,URINE - Urine, Clean Catch    5. Elevated blood pressure reading  Comments:  Hx CHTN, no meds prepreg or in preg for BP  Orders:  -     Comprehensive Metabolic Panel  -     CBC (No Diff)  -     NIFEdipine XL (Procardia XL) 30 MG 24 hr tablet; Take 2 tablets by mouth Daily.  Dispense: 30 tablet; Refill: 1      Counseling:    May resume normal activities  Core strengthening exercises reviewed and recommended  Kegel exercises reviewed and recommended  Ok to return to work/school once patient desires/maternity leave completed        Follow Up:  Return in about 1 week (around 11/21/2024) for BP check and in 4weeks Discuss bc/btl options.          Isabel Bernal,   11/14/2024    [facility]  [facility] [place] [address]  Dept: [phone]  Dept Fax: [phone]  Loc: [phone]  Loc Fax: [phone]

## 2024-11-12 PROBLEM — Z34.90 ENCOUNTER FOR INDUCTION OF LABOR: Status: RESOLVED | Noted: 2024-10-16 | Resolved: 2024-11-12

## 2024-11-12 PROBLEM — O09.93 HIGH-RISK PREGNANCY IN THIRD TRIMESTER: Status: RESOLVED | Noted: 2024-03-21 | Resolved: 2024-11-12

## 2024-11-12 PROBLEM — O10.919 CHRONIC HYPERTENSION IN PREGNANCY: Status: RESOLVED | Noted: 2023-03-17 | Resolved: 2024-11-12

## 2024-11-12 PROBLEM — F15.11 HISTORY OF METHAMPHETAMINE ABUSE: Status: RESOLVED | Noted: 2021-07-12 | Resolved: 2024-11-12

## 2024-11-14 ENCOUNTER — POSTPARTUM VISIT (OUTPATIENT)
Dept: OBSTETRICS AND GYNECOLOGY | Facility: CLINIC | Age: 29
End: 2024-11-14
Payer: COMMERCIAL

## 2024-11-14 VITALS
BODY MASS INDEX: 36.48 KG/M2 | HEART RATE: 67 BPM | HEIGHT: 66 IN | SYSTOLIC BLOOD PRESSURE: 154 MMHG | DIASTOLIC BLOOD PRESSURE: 110 MMHG | WEIGHT: 227 LBS

## 2024-11-14 DIAGNOSIS — Z30.2 REQUEST FOR STERILIZATION: ICD-10-CM

## 2024-11-14 DIAGNOSIS — R03.0 ELEVATED BLOOD PRESSURE READING: ICD-10-CM

## 2024-11-14 DIAGNOSIS — Z30.09 BIRTH CONTROL COUNSELING: ICD-10-CM

## 2024-11-14 DIAGNOSIS — R89.9 ABNORMAL LABORATORY TEST RESULT: ICD-10-CM

## 2024-11-14 LAB
DEPRECATED RDW RBC AUTO: 41.3 FL (ref 37–54)
ERYTHROCYTE [DISTWIDTH] IN BLOOD BY AUTOMATED COUNT: 12.2 % (ref 12.3–15.4)
HCT VFR BLD AUTO: 38.4 % (ref 34–46.6)
HGB BLD-MCNC: 12.6 G/DL (ref 12–15.9)
MCH RBC QN AUTO: 30.7 PG (ref 26.6–33)
MCHC RBC AUTO-ENTMCNC: 32.8 G/DL (ref 31.5–35.7)
MCV RBC AUTO: 93.4 FL (ref 79–97)
PLATELET # BLD AUTO: 282 10*3/MM3 (ref 140–450)
PMV BLD AUTO: 11.9 FL (ref 6–12)
RBC # BLD AUTO: 4.11 10*6/MM3 (ref 3.77–5.28)
WBC NRBC COR # BLD AUTO: 6.88 10*3/MM3 (ref 3.4–10.8)

## 2024-11-14 PROCEDURE — 85027 COMPLETE CBC AUTOMATED: CPT | Performed by: OBSTETRICS & GYNECOLOGY

## 2024-11-14 PROCEDURE — G0480 DRUG TEST DEF 1-7 CLASSES: HCPCS | Performed by: OBSTETRICS & GYNECOLOGY

## 2024-11-14 PROCEDURE — 80053 COMPREHEN METABOLIC PANEL: CPT | Performed by: OBSTETRICS & GYNECOLOGY

## 2024-11-14 RX ORDER — NIFEDIPINE 30 MG/1
60 TABLET, EXTENDED RELEASE ORAL DAILY
Qty: 30 TABLET | Refills: 1 | Status: SHIPPED | OUTPATIENT
Start: 2024-11-14

## 2024-11-14 RX ORDER — ACETAMINOPHEN AND CODEINE PHOSPHATE 120; 12 MG/5ML; MG/5ML
1 SOLUTION ORAL DAILY
Qty: 90 TABLET | Refills: 4 | Status: SHIPPED | OUTPATIENT
Start: 2024-11-14

## 2024-11-15 LAB
ALBUMIN SERPL-MCNC: 4.4 G/DL (ref 3.5–5.2)
ALBUMIN/GLOB SERPL: 1.5 G/DL
ALP SERPL-CCNC: 96 U/L (ref 39–117)
ALT SERPL W P-5'-P-CCNC: 22 U/L (ref 1–33)
ANION GAP SERPL CALCULATED.3IONS-SCNC: 10.1 MMOL/L (ref 5–15)
AST SERPL-CCNC: 20 U/L (ref 1–32)
BILIRUB SERPL-MCNC: 0.4 MG/DL (ref 0–1.2)
BUN SERPL-MCNC: 11 MG/DL (ref 6–20)
BUN/CREAT SERPL: 12.5 (ref 7–25)
CALCIUM SPEC-SCNC: 9.2 MG/DL (ref 8.6–10.5)
CHLORIDE SERPL-SCNC: 104 MMOL/L (ref 98–107)
CO2 SERPL-SCNC: 22.9 MMOL/L (ref 22–29)
CREAT SERPL-MCNC: 0.88 MG/DL (ref 0.57–1)
EGFRCR SERPLBLD CKD-EPI 2021: 90.8 ML/MIN/1.73
GLOBULIN UR ELPH-MCNC: 3 GM/DL
GLUCOSE SERPL-MCNC: 88 MG/DL (ref 65–99)
POTASSIUM SERPL-SCNC: 4 MMOL/L (ref 3.5–5.2)
PROT SERPL-MCNC: 7.4 G/DL (ref 6–8.5)
SODIUM SERPL-SCNC: 137 MMOL/L (ref 136–145)

## 2024-11-19 PROBLEM — R89.9 ABNORMAL LABORATORY TEST RESULT: Status: RESOLVED | Noted: 2024-10-16 | Resolved: 2024-11-19

## 2024-11-19 PROBLEM — F41.1 GENERALIZED ANXIETY DISORDER: Status: RESOLVED | Noted: 2021-07-12 | Resolved: 2024-11-19

## 2024-11-19 LAB — BENZODIAZ CTO UR CFM-MCNC: NEGATIVE NG/ML

## 2024-11-19 NOTE — PROGRESS NOTES
GYN Follow Up Visit      Chief Complaint   Patient presents with    Discuss Tubal     HPI:    Comprehensive Metabolic Panel (2024 15:13)  CBC (No Diff) (2024 15:13)  BENZODIAZEPINE CONFIRMATION,URINE - Urine, Clean Catch (2024 15:37)    Progress Notes by Isabel Bernal DO (2024 14:15)    Status post  10/16/2024, intact perineum.    Seen for routine postpartum visit last week.    Chronic hypertension was currently on no medications.  Blood pressure 154/110.  Started on Procardia 30 XL twice daily.  Labs within normal limits.    Desires permanent surgical sterilization.    PHYSICAL EXAM:  /80   Wt 103 kg (228 lb)   Breastfeeding Yes   BMI 36.80 kg/m²  Not found.  General- NAD, alert and oriented, appropriate  Psych- Normal mood, good memory, good eye contact  Extremities- No edema, patellar DTR +1    ASSESSMENT AND PLAN:  Diagnoses and all orders for this visit:    1. Chronic hypertension (Primary)  Comments:  stable on procardia.  Check BP before each dose and can hold if 120/or 80.  Suspect will be able to come off or go to once a day    2. Request for sterilization  Overview:  Tubal consents x 2 2024       3. Preoperative examination      Counseling:    Hypertension precautions, return to labor and delivery for headache, vision changes, right upper quadrant pain, upper extremity edema, elevated blood pressures despite medication systolic 155 or higher or diastolic 100 or higher.        Follow Up:  Return for Postop FU 4weeks.          Isabel Bernal DO  2024    [facility] [place] [facility]  [facility] [place] [address]  Dept: [phone]  Dept Fax: [phone]  Loc: [phone]  Loc Fax: [phone]        GYN HISTORY & PHYSICAL      Patient Name: Abbe Amos  : 1995  MRN: [id number]    Subjective     Chief Complaint:   Chief Complaint   Patient presents with    Discuss Tubal       HPI:  29 y.o.  No LMP  recorded.  Social History     Substance and Sexual Activity   Sexual Activity Yes    Partners: Male    Birth control/protection: None       Desires permanent surgical sterilization and risk reducing procedure.    ROS: All negative except listed in HPI    Personal History     PMHx:    Past Medical History:   Diagnosis Date    + UDS, benzo 10/16/2024    10/16/2024 confirmatory dc'd by lab, repeated 2024 neg      Abnormal Pap smear of cervix     Ankle sprain 2022    Left ankle    Anxiety     Carpal tunnel syndrome of right wrist 2021    Chemical dependency 2019    Chronic viral hepatitis C 2021    Depression     Fracture of toe of right foot     5th    Fracture of wrist 2020    Right hand    Generalized anxiety disorder 2021    Hepatitis C     completed treatment    Herpes genital     Hidradenitis suppurativa 2021    History of methamphetamine abuse 2021 UDS negative at 2024      History of self-harm 2020    History of suicidal ideation 2021    HPV (human papilloma virus) infection     Hyperlipemia     Post traumatic stress disorder (PTSD) 2024    Primary hypertension 2023    Sebaceous cyst of left axilla     STD (sexually transmitted disease)        OBHx:   OB History    Para Term  AB Living   6 6 5 1 0 6   SAB IAB Ectopic Molar Multiple Live Births   0 0 0 0 0 6      # Outcome Date GA Lbr Rikki/2nd Weight Sex Type Anes PTL Lv   6 Term 10/16/24 39w3d 04:36 / 00:05 3860 g (8 lb 8.2 oz) M Vag-Spont EPI  JANETH   5 Term 21 40w0d  3600 g (7 lb 15 oz) F Vag-Spont   JANETH   4 Term 19 40w0d  3459 g (7 lb 10 oz) F Vag-Spont   JANETH   3 Term 09/10/18 40w0d  3345 g (7 lb 6 oz) F Vag-Spont   JANETH   2  17 36w0d  2438 g (5 lb 6 oz) F Vag-Spont   JANETH   1 Term 14 40w0d  3430 g (7 lb 9 oz) M Vag-Spont   JANETH        Medications:  FLUoxetine, NIFEdipine XL, Prenatal Vit-Fe Fumarate-FA, acetaminophen, busPIRone,  docusate sodium, ibuprofen, norethindrone, prazosin, and valACYclovir    Allergies:  No Known Allergies    PSHx:   Past Surgical History:   Procedure Laterality Date    EXCISION LESION Left 11/26/2021    Procedure: EXCISION CYST left axilla;  Surgeon: Serg Sterling MD;  Location: [facility] OR;  Service: General;  Laterality: Left;    PILONIDAL CYST DRAINAGE      at age 11       Social History:    reports that she quit smoking about 16 months ago. Her smoking use included cigarettes. She started smoking about 16 years ago. She has a 7.5 pack-year smoking history. She has never used smokeless tobacco. She reports that she does not currently use alcohol. She reports that she does not currently use drugs after having used the following drugs: Marijuana and Methamphetamines.    Family History:   Family History   Problem Relation Age of Onset    Drug abuse Father         In recovery    Thyroid cancer Mother     Hypertension Mother     Malig Hyperthermia Neg Hx     Breast cancer Neg Hx     Ovarian cancer Neg Hx     Uterine cancer Neg Hx     Prostate cancer Neg Hx     Colon cancer Neg Hx     Mental illness Neg Hx     Mental retardation Neg Hx     Miscarriages / Stillbirths Neg Hx     Learning disabilities Neg Hx     Kidney disease Neg Hx     Hyperlipidemia Neg Hx     Heart disease Neg Hx     Hearing loss Neg Hx     Early death Neg Hx     Depression Neg Hx     COPD Neg Hx     Bleeding Disorder Neg Hx     Birth defects Neg Hx     Arthritis Neg Hx     Asthma Neg Hx     Alcohol abuse Neg Hx     Deep vein thrombosis Neg Hx     Pulmonary embolism Neg Hx        Immunizations: Non contributory to this admission        Objective     Vitals:   BP: (128)/(80) 128/80    PHYSICAL EXAM:   General- NAD, alert and oriented, appropriate  Psych- Normal mood, good memory  Cardiovascular- Regular rhythm, no murnurs  Respiratory- CTA to bases, no wheezes  Abdomen-not examined  Pelvic-not examined, see prior  Lymphatic-not examined,  see prior  Rectal- Not examined.     Extremities- No edema, bilaterally equal      Lab/Imaging/Other: Pap smear March 2024 negative, HPV negative      Assessment / Plan     Assessment:  Diagnoses and all orders for this visit:    1. Chronic hypertension (Primary)  Comments:  stable on procardia.  Check BP before each dose and can hold if 120/or 80.  Suspect will be able to come off or go to once a day    2. Request for sterilization  Overview:  Tubal consents x 2 11/14/2024       3. Preoperative examination        Plan:   She declines expectant, conservative or medical management and desires surgery.  Will move forward with: Laparoscopic bilateral salpingectomy, possible tubal occlusion.  Abstinence until surgery.  Counseling: Risks, benefits, alternatives, side-effects, and efficacy of surgery were discussed.  Surgery risks are not limited to anesthesia, bleeding, blood transfusion, infection, damage to surrounding organs, wound separation, re-operation, thromboembolic disease, death.    See separate written and signed consent for surgical specific risks and benefits.      Return for Postop FU 4weeks.          Signature: Electronically signed by Isabel Bernal DO, 11/20/24, 7:48 PM EST.      [facility] [place]  [address] [place] [address]  Dept: [phone]  Dept Fax: [phone]  Loc: [phone]  Loc Fax: [phone]

## 2024-11-20 ENCOUNTER — POSTPARTUM VISIT (OUTPATIENT)
Dept: OBSTETRICS AND GYNECOLOGY | Facility: CLINIC | Age: 29
End: 2024-11-20
Payer: COMMERCIAL

## 2024-11-20 ENCOUNTER — PREP FOR SURGERY (OUTPATIENT)
Dept: OTHER | Facility: HOSPITAL | Age: 29
End: 2024-11-20
Payer: COMMERCIAL

## 2024-11-20 VITALS — DIASTOLIC BLOOD PRESSURE: 80 MMHG | SYSTOLIC BLOOD PRESSURE: 128 MMHG | WEIGHT: 228 LBS | BODY MASS INDEX: 36.8 KG/M2

## 2024-11-20 DIAGNOSIS — Z30.2 REQUEST FOR STERILIZATION: ICD-10-CM

## 2024-11-20 DIAGNOSIS — Z01.818 PREOPERATIVE EXAMINATION: ICD-10-CM

## 2024-11-20 DIAGNOSIS — I10 CHRONIC HYPERTENSION: Primary | ICD-10-CM

## 2024-11-20 DIAGNOSIS — Z30.2 REQUEST FOR STERILIZATION: Primary | ICD-10-CM

## 2024-11-20 RX ORDER — SODIUM CHLORIDE 0.9 % (FLUSH) 0.9 %
3 SYRINGE (ML) INJECTION EVERY 12 HOURS SCHEDULED
OUTPATIENT
Start: 2024-11-20

## 2024-11-20 RX ORDER — ONDANSETRON 2 MG/ML
4 INJECTION INTRAMUSCULAR; INTRAVENOUS EVERY 6 HOURS PRN
OUTPATIENT
Start: 2024-11-20

## 2024-11-20 RX ORDER — SODIUM CHLORIDE 9 MG/ML
40 INJECTION, SOLUTION INTRAVENOUS AS NEEDED
OUTPATIENT
Start: 2024-11-20 | End: 2024-11-20

## 2024-11-20 RX ORDER — SODIUM CHLORIDE, SODIUM LACTATE, POTASSIUM CHLORIDE, CALCIUM CHLORIDE 600; 310; 30; 20 MG/100ML; MG/100ML; MG/100ML; MG/100ML
150 INJECTION, SOLUTION INTRAVENOUS CONTINUOUS
OUTPATIENT
Start: 2024-11-20 | End: 2024-11-21

## 2024-11-20 RX ORDER — SODIUM CHLORIDE 0.9 % (FLUSH) 0.9 %
10 SYRINGE (ML) INJECTION AS NEEDED
OUTPATIENT
Start: 2024-11-20

## 2024-12-20 DIAGNOSIS — R03.0 ELEVATED BLOOD PRESSURE READING: ICD-10-CM

## 2024-12-20 RX ORDER — NIFEDIPINE 30 MG/1
60 TABLET, EXTENDED RELEASE ORAL DAILY
Qty: 30 TABLET | Refills: 1 | Status: SHIPPED | OUTPATIENT
Start: 2024-12-20

## 2024-12-20 NOTE — TELEPHONE ENCOUNTER
The pt is requesting refills of her Procardia.  She was last seen for Postpartum on 11/20/24 and was told to follow up in 4 weeks in regard to the medication and for another postop visit.  She had a visit scheduled for today that was cancelled because she was called and told that the appointment was made in error and that it was not necessary.  Please advise. Last Rx was sent 11/14/24 #30 and 1 refill.  Thanks.

## 2024-12-20 NOTE — TELEPHONE ENCOUNTER
The patient said that is correct, she is still taking 30 mg twice per day of the Procardia.  She said that she has not been called yet to schedule the surgery but she was told it was because they put the type of surgery she needed on hold due to shortages with weather-related issues that happened previously.  She said you all discussed her using an OCP until the surgery but she doesn't like the way the pill makes her feel.  When she is on birth control pills, her mood is greatly affected.

## 2024-12-20 NOTE — TELEPHONE ENCOUNTER
I have provided a 2month supply, remaining refills w PCP please.  Raisa and Kay could you please get w pt to schedule sterilization>

## 2025-01-08 ENCOUNTER — OFFICE VISIT (OUTPATIENT)
Dept: INTERNAL MEDICINE | Facility: CLINIC | Age: 30
End: 2025-01-08
Payer: COMMERCIAL

## 2025-01-08 VITALS
TEMPERATURE: 98.3 F | OXYGEN SATURATION: 98 % | RESPIRATION RATE: 16 BRPM | HEIGHT: 66 IN | WEIGHT: 227 LBS | DIASTOLIC BLOOD PRESSURE: 82 MMHG | HEART RATE: 98 BPM | BODY MASS INDEX: 36.48 KG/M2 | SYSTOLIC BLOOD PRESSURE: 128 MMHG

## 2025-01-08 DIAGNOSIS — F33.1 MAJOR DEPRESSIVE DISORDER, RECURRENT EPISODE, MODERATE DEGREE: Primary | ICD-10-CM

## 2025-01-08 DIAGNOSIS — R03.0 ELEVATED BLOOD PRESSURE READING WITHOUT DIAGNOSIS OF HYPERTENSION: ICD-10-CM

## 2025-01-08 DIAGNOSIS — L73.2 HIDRADENITIS SUPPURATIVA: ICD-10-CM

## 2025-01-08 LAB
ALBUMIN SERPL-MCNC: 4.1 G/DL (ref 3.5–5.2)
ALBUMIN/GLOB SERPL: 1.2 G/DL
ALP SERPL-CCNC: 82 U/L (ref 39–117)
ALT SERPL W P-5'-P-CCNC: 31 U/L (ref 1–33)
ANION GAP SERPL CALCULATED.3IONS-SCNC: 10.7 MMOL/L (ref 5–15)
AST SERPL-CCNC: 26 U/L (ref 1–32)
BASOPHILS # BLD AUTO: 0.07 10*3/MM3 (ref 0–0.2)
BASOPHILS NFR BLD AUTO: 1.1 % (ref 0–1.5)
BILIRUB SERPL-MCNC: 0.3 MG/DL (ref 0–1.2)
BUN SERPL-MCNC: 10 MG/DL (ref 6–20)
BUN/CREAT SERPL: 12 (ref 7–25)
CALCIUM SPEC-SCNC: 9 MG/DL (ref 8.6–10.5)
CHLORIDE SERPL-SCNC: 108 MMOL/L (ref 98–107)
CHOLEST SERPL-MCNC: 273 MG/DL (ref 0–200)
CO2 SERPL-SCNC: 23.3 MMOL/L (ref 22–29)
CREAT SERPL-MCNC: 0.83 MG/DL (ref 0.57–1)
DEPRECATED RDW RBC AUTO: 44.2 FL (ref 37–54)
EGFRCR SERPLBLD CKD-EPI 2021: 97.4 ML/MIN/1.73
EOSINOPHIL # BLD AUTO: 0.24 10*3/MM3 (ref 0–0.4)
EOSINOPHIL NFR BLD AUTO: 3.7 % (ref 0.3–6.2)
ERYTHROCYTE [DISTWIDTH] IN BLOOD BY AUTOMATED COUNT: 12.9 % (ref 12.3–15.4)
GLOBULIN UR ELPH-MCNC: 3.5 GM/DL
GLUCOSE SERPL-MCNC: 86 MG/DL (ref 65–99)
HCT VFR BLD AUTO: 38.3 % (ref 34–46.6)
HDLC SERPL-MCNC: 37 MG/DL (ref 40–60)
HGB BLD-MCNC: 12.9 G/DL (ref 12–15.9)
IMM GRANULOCYTES # BLD AUTO: 0.01 10*3/MM3 (ref 0–0.05)
IMM GRANULOCYTES NFR BLD AUTO: 0.2 % (ref 0–0.5)
LDLC SERPL CALC-MCNC: 218 MG/DL (ref 0–100)
LDLC/HDLC SERPL: 5.84 {RATIO}
LYMPHOCYTES # BLD AUTO: 1.92 10*3/MM3 (ref 0.7–3.1)
LYMPHOCYTES NFR BLD AUTO: 29.5 % (ref 19.6–45.3)
MCH RBC QN AUTO: 31.7 PG (ref 26.6–33)
MCHC RBC AUTO-ENTMCNC: 33.7 G/DL (ref 31.5–35.7)
MCV RBC AUTO: 94.1 FL (ref 79–97)
MONOCYTES # BLD AUTO: 0.67 10*3/MM3 (ref 0.1–0.9)
MONOCYTES NFR BLD AUTO: 10.3 % (ref 5–12)
NEUTROPHILS NFR BLD AUTO: 3.6 10*3/MM3 (ref 1.7–7)
NEUTROPHILS NFR BLD AUTO: 55.2 % (ref 42.7–76)
NRBC BLD AUTO-RTO: 0 /100 WBC (ref 0–0.2)
PLATELET # BLD AUTO: 311 10*3/MM3 (ref 140–450)
PMV BLD AUTO: 11.3 FL (ref 6–12)
POTASSIUM SERPL-SCNC: 3.9 MMOL/L (ref 3.5–5.2)
PROT SERPL-MCNC: 7.6 G/DL (ref 6–8.5)
RBC # BLD AUTO: 4.07 10*6/MM3 (ref 3.77–5.28)
SODIUM SERPL-SCNC: 142 MMOL/L (ref 136–145)
TRIGL SERPL-MCNC: 100 MG/DL (ref 0–150)
TSH SERPL DL<=0.05 MIU/L-ACNC: 0.99 UIU/ML (ref 0.27–4.2)
VLDLC SERPL-MCNC: 18 MG/DL (ref 5–40)
WBC NRBC COR # BLD AUTO: 6.51 10*3/MM3 (ref 3.4–10.8)

## 2025-01-08 PROCEDURE — 1159F MED LIST DOCD IN RCRD: CPT | Performed by: PHYSICIAN ASSISTANT

## 2025-01-08 PROCEDURE — 1160F RVW MEDS BY RX/DR IN RCRD: CPT | Performed by: PHYSICIAN ASSISTANT

## 2025-01-08 PROCEDURE — 85025 COMPLETE CBC W/AUTO DIFF WBC: CPT | Performed by: PHYSICIAN ASSISTANT

## 2025-01-08 PROCEDURE — 84443 ASSAY THYROID STIM HORMONE: CPT | Performed by: PHYSICIAN ASSISTANT

## 2025-01-08 PROCEDURE — 80061 LIPID PANEL: CPT | Performed by: PHYSICIAN ASSISTANT

## 2025-01-08 PROCEDURE — 1126F AMNT PAIN NOTED NONE PRSNT: CPT | Performed by: PHYSICIAN ASSISTANT

## 2025-01-08 PROCEDURE — 80053 COMPREHEN METABOLIC PANEL: CPT | Performed by: PHYSICIAN ASSISTANT

## 2025-01-08 PROCEDURE — 99214 OFFICE O/P EST MOD 30 MIN: CPT | Performed by: PHYSICIAN ASSISTANT

## 2025-01-08 NOTE — PROGRESS NOTES
Chief Complaint  Hypertension    Subjective          Abbe Amos presents to [facility]  History of Present Illness  Pt here for blood pressure follow up   Pt stopped Nifedipine due to headaches  BP is spiking to 150-160/ at home at times  Denies chest pain, palpitations, dizziness, syncope  Pt is breastfeeding   Pt not drinking much water, max 1-2 cups/day     Pt states depression has been doing ok   Taking prozac daily  Denies si/hi   Pt is breastfeeding, states Prozac is medicine that helps mood the most out of previous medicines tried  States she is just tried from having infant at home    Hs: under armpits  Would like referral to derm now that she is not pregnant to discuss tx options  Past Medical History:   Diagnosis Date    + UDS, benzo 10/16/2024    10/16/2024 confirmatory dc'd by lab, repeated Nov 2024 neg      Abnormal Pap smear of cervix     Ankle sprain 08/09/2022    Left ankle    Anxiety     Carpal tunnel syndrome of right wrist 09/28/2021    Chemical dependency 06/2019    Chronic viral hepatitis C 05/28/2021    Depression     Fracture of toe of right foot     5th    Fracture of wrist 08/18/2020    Right hand    Generalized anxiety disorder 07/12/2021    Hepatitis C     completed treatment    Herpes genital     Hidradenitis suppurativa 07/12/2021    History of methamphetamine abuse 07/12/2021 7/31/2024 UDS negative at March 2024      History of self-harm 06/11/2020    History of suicidal ideation 07/12/2021    HPV (human papilloma virus) infection     Hyperlipemia     Post traumatic stress disorder (PTSD) 04/16/2024    Primary hypertension 03/17/2023    Sebaceous cyst of left axilla     STD (sexually transmitted disease)         Past Surgical History:   Procedure Laterality Date    EXCISION LESION Left 11/26/2021    Procedure: EXCISION CYST left axilla;  Surgeon: Serg Sterling MD;  Location: [facility] MA[facility] OR;  Service: General;   Laterality: Left;    PILONIDAL CYST DRAINAGE      at age 11        Current Outpatient Medications on File Prior to Visit   Medication Sig Dispense Refill    acetaminophen (Tylenol) 325 MG tablet Take 2 tablets by mouth Every 6 (Six) Hours As Needed for Mild Pain (alternate with motrin). 30 tablet 1    busPIRone (BUSPAR) 10 MG tablet Take 2 tablets by mouth 3 (Three) Times a Day. Indications: Anxiety Disorder, Major Depressive Disorder      docusate sodium (Colace) 100 MG capsule Take 1 capsule by mouth 2 (Two) Times a Day As Needed for Constipation. 60 capsule 0    ibuprofen (ADVIL,MOTRIN) 600 MG tablet Take 1 tablet by mouth Every 6 (Six) Hours As Needed for Mild Pain. 30 tablet 0    prazosin (MINIPRESS) 2 MG capsule Take 1 capsule by mouth every night at bedtime. Indications: Frightening Dreams      Prenatal Vit-Fe Fumarate-FA (PRENATAL VITAMIN PO) Take 1 tablet by mouth Daily.      valACYclovir (VALTREX) 1000 MG tablet Take 1 tablet by mouth Daily. 90 tablet 4    [DISCONTINUED] FLUoxetine (PROzac) 20 MG capsule Take 3 capsules by mouth Daily. Indications: Major Depressive Disorder 90 capsule 0    [DISCONTINUED] norethindrone (MICRONOR) 0.35 MG tablet Take 1 tablet by mouth Daily. 90 tablet 4    [DISCONTINUED] NIFEdipine XL (PROCARDIA XL) 30 MG 24 hr tablet TAKE 2 TABLETS BY MOUTH EVERY DAY (Patient not taking: Reported on 2025) 30 tablet 1     No current facility-administered medications on file prior to visit.        No Known Allergies    Social History     Tobacco Use   Smoking Status Former    Current packs/day: 0.00    Average packs/day: 0.5 packs/day for 15.0 years (7.5 ttl pk-yrs)    Types: Cigarettes    Start date: 2008    Quit date: 2023    Years since quittin.5   Smokeless Tobacco Never   Tobacco Comments    Smoked 11-20 years- INSTRUCTED NO SMOKING 24 HR PRIOR TO SURGERY           Objective   Vital Signs:   /82 (BP Location: Right arm, Patient Position: Sitting, Cuff Size: Adult)    "Pulse 98   Temp 98.3 °F (36.8 °C) (Temporal)   Resp 16   Ht 167.6 cm (66\")   Wt 103 kg (227 lb)   SpO2 98%   BMI 36.64 kg/m²     Physical Exam  Vitals reviewed.   Constitutional:       Appearance: Normal appearance.   HENT:      Head: Normocephalic and atraumatic.      Nose: Nose normal.      Mouth/Throat:      Mouth: Mucous membranes are moist.   Eyes:      Extraocular Movements: Extraocular movements intact.      Conjunctiva/sclera: Conjunctivae normal.      Pupils: Pupils are equal, round, and reactive to light.   Cardiovascular:      Rate and Rhythm: Normal rate and regular rhythm.   Pulmonary:      Effort: Pulmonary effort is normal.      Breath sounds: Normal breath sounds.   Abdominal:      General: Abdomen is flat. Bowel sounds are normal.      Palpations: Abdomen is soft.   Musculoskeletal:         General: Normal range of motion.   Neurological:      General: No focal deficit present.      Mental Status: She is alert and oriented to person, place, and time.   Psychiatric:         Mood and Affect: Mood normal.        Result Review :            Class 2 Severe Obesity (BMI >=35 and <=39.9). Obesity-related health conditions include the following: none. Obesity is unchanged. BMI is is above average; BMI management plan is completed. We discussed portion control and increasing exercise.              Assessment and Plan    Diagnoses and all orders for this visit:    1. Major depressive disorder, recurrent episode, moderate degree (Primary)  Comments:  Mood stable, cont current medicine  Orders:  -     Comprehensive Metabolic Panel  -     CBC & Differential  -     TSH  -     Lipid Panel    2. Elevated blood pressure reading without diagnosis of hypertension  Comments:  BP wnl. Pt self d/c Nifedipine. Likly improving with time after delivery. Pt will monitor bp at home and rtc 1 month to make sure staying wnl.    3. Hidradenitis suppurativa  -     Ambulatory Referral to Dermatology    Other orders  -     " FLUoxetine (PROzac) 20 MG capsule; Take 3 capsules by mouth Daily. Indications: Major Depressive Disorder  Dispense: 90 capsule; Refill: 2        Follow Up   Return in about 1 month (around 2/8/2025).  Patient was given instructions and counseling regarding her condition or for health maintenance advice. Please see specific information pulled into the AVS if appropriate.

## 2025-01-28 ENCOUNTER — TELEPHONE (OUTPATIENT)
Dept: OBSTETRICS AND GYNECOLOGY | Facility: CLINIC | Age: 30
End: 2025-01-28
Payer: COMMERCIAL

## 2025-01-28 NOTE — TELEPHONE ENCOUNTER
ANTICOAGULATION  MANAGEMENT- Home Care/Care Facility Result    Assessment     Today's INR result of 3.4 is Supratherapeutic (goal INR of 2.0-3.0)        Warfarin taken as previously instructed    No new diet changes affecting INR    No new medication/supplements affecting INR    Continues to tolerate warfarin with no reported s/s of bleeding or thromboembolism     Previous INR was Therapeutic    Plan:     Spoke with nurse Julia discussed INR result and instructed:     Warfarin Dosing Instructions: hold today, then change dose to 7.5 mg daily on sun/wed; and 5 mg daily rest of week  (11 % change)    Next INR to be drawn: tue with home care      Julia verbalizes understanding and agrees to warfarin dosing plan.   ?   Zahra Gonzalez RN    Subjective/Objective:      Parish Bills, a 79 y.o. male is established on warfarin.     Home care/care facility RN's report of Parish INR, recent warfarin dosing, diet changes, medication changes, and symptoms is documented below.    Additional findings: none    Anticoagulation Episode Summary     Current INR goal:   2.0-3.0   TTR:   92.7 % (11.9 mo)   Next INR check:   8/11/2020   INR from last check:      Weekly max warfarin dose:      Target end date:      INR check location:      Preferred lab:      Send INR reminders to:   [facility]    Indications    A-fib (H) [I48.91]           Comments:            Anticoagulation Care Providers     Provider Role Specialty Phone number    Isidro Au MD Referring Family Medicine [phone]                         Patient has canceled tubal for 1/29/25 - does not want to reschedule and does not need a RX for birth control - surgery has been canceled.

## (undated) DEVICE — GAUZE,SPONGE,4"X4",16PLY,STRL,LF,10/TRAY: Brand: MEDLINE

## (undated) DEVICE — ADHS SKIN SURG TISS VISC PREMIERPRO EXOFIN HI/VISC FAST/DRY

## (undated) DEVICE — SUT MNCRYL 4/0 PS2 18 IN

## (undated) DEVICE — DRAPE,LAPAROTOMY,PCH,STERILE: Brand: MEDLINE

## (undated) DEVICE — PENCL E/S SMOKEEVAC W/TELESCP CANN

## (undated) DEVICE — GLV SURG BIOGEL LTX PF 7 1/2

## (undated) DEVICE — 3M™ IOBAN™ 2 ANTIMICROBIAL INCISE DRAPE 6650EZ: Brand: IOBAN™ 2

## (undated) DEVICE — MAJOR-LF: Brand: MEDLINE INDUSTRIES, INC.

## (undated) DEVICE — SYR LUERLOK 30CC

## (undated) DEVICE — GLV SURG SENSICARE SLT PF LF 7.5 STRL

## (undated) DEVICE — SUT VIC 3/0 SH 27IN J416H